# Patient Record
Sex: FEMALE | Race: BLACK OR AFRICAN AMERICAN | NOT HISPANIC OR LATINO | Employment: UNEMPLOYED | URBAN - METROPOLITAN AREA
[De-identification: names, ages, dates, MRNs, and addresses within clinical notes are randomized per-mention and may not be internally consistent; named-entity substitution may affect disease eponyms.]

---

## 2017-01-03 ENCOUNTER — OFFICE VISIT (OUTPATIENT)
Dept: BEHAVIORAL HEALTH | Facility: PHYSICIAN GROUP | Age: 37
End: 2017-01-03
Payer: COMMERCIAL

## 2017-01-03 VITALS
HEART RATE: 93 BPM | DIASTOLIC BLOOD PRESSURE: 106 MMHG | SYSTOLIC BLOOD PRESSURE: 145 MMHG | WEIGHT: 142 LBS | HEIGHT: 63 IN | BODY MASS INDEX: 25.16 KG/M2

## 2017-01-03 DIAGNOSIS — F10.20 ALCOHOL USE DISORDER, MODERATE, DEPENDENCE (HCC): ICD-10-CM

## 2017-01-03 DIAGNOSIS — F41.9 ANXIETY DISORDER, UNSPECIFIED: ICD-10-CM

## 2017-01-03 PROCEDURE — 99214 OFFICE O/P EST MOD 30 MIN: CPT | Performed by: PSYCHIATRY & NEUROLOGY

## 2017-01-03 RX ORDER — NALTREXONE HYDROCHLORIDE 50 MG/1
50 TABLET, FILM COATED ORAL DAILY
Qty: 30 TAB | Refills: 0 | Status: SHIPPED | OUTPATIENT
Start: 2017-01-03 | End: 2017-02-03 | Stop reason: SDUPTHER

## 2017-01-03 RX ORDER — BUSPIRONE HYDROCHLORIDE 10 MG/1
10 TABLET ORAL 2 TIMES DAILY
Qty: 60 TAB | Refills: 0 | Status: SHIPPED | OUTPATIENT
Start: 2017-01-03 | End: 2017-02-03

## 2017-01-03 NOTE — PROGRESS NOTES
PSYCHIATRY FOLLOW-UP NOTE      Chief Complaint   Patient presents with   • Follow-Up     alcohol dependence         History Of Present Illness:  Donna Morales is a 36 y.o. old female with HTN, alcohol use disorder comes in today for follow up, was last seen by LOGAN Jimenes 4 weeks ago. She is switching providers and this is her first visit with me. She reports continued struggles with alcohol with her alcohol. She has had problems with alcohol for almost 10 years, endorses binge drinking mainly on the weekends or during medications. She avoids drinking during weekdays because of her job. She denies any recent problems at her job because of drinking but in the past she has been in trouble because of her drinking. She had one DUI in 2011. She states that she starts drinking socially with her friends but it continues even after she gets back home. She likes to drink mixed drinks or red wine, is not sure about her quantity but feels that it might be close to three fourths of a bottle. She has withdrawal symptoms including severe anxiety, shaky, nausea, poor sleep. Denies history of alcohol withdrawal seizures. Her last drink was on New Year's Kimberley when she had a couple of champagne drinks. She has alcohol cravings and would like to take any medication to curb those cravings. She endorses having some anxiety growing up as well but it has worsened with her heavy alcohol use. Denies persistent prominent depressive symptoms that does feel sad and frustrated with her alcohol intake. Sleep is often problematic but problems staying asleep, uses trazodone as needed which does help her sleep. Appetite fine. Denies anhedonia. Denies use of any illicit drug. Denies having thoughts of wanting to hurt herself or others.    Social History:   Employed, has been working as a  for 15 years. Lives alone in [place]. She has good support from her mother who lives close to her. Single currently, never  ", no kids.     Father - alcohol and illicit drug addiction  Paternal aunt - illicit drug addiction  Paternal grand parents - alcohol addiction     Substance Use:  Alcohol - See HPI  Nicotine - Denies  Illicit drugs - Denies    Past Medication Trials:  Topamax, Ativan    Medications:  Current Outpatient Prescriptions   Medication Sig Dispense Refill   • naltrexone (DEPADE) 50 MG Tab Take 1 Tab by mouth every day. 30 Tab 0   • busPIRone (BUSPAR) 10 MG Tab Take 1 Tab by mouth 2 times a day. 60 Tab 0   • LO LOESTRIN FE 1 MG-10 MCG / 10 MCG Tab   11   • lisinopril-hydrochlorothiazide (PRINZIDE, ZESTORETIC) 20-25 MG per tablet Take 1 Tab by mouth every day.     • FLUVIRIN PRESERVATIVE FREE 0.5 ML Suspension Prefilled Syringe   0   • meloxicam (MOBIC) 15 MG tablet   5   • trazodone (DESYREL) 100 MG Tab Take 100 mg by mouth every evening.       No current facility-administered medications for this visit.       Review Of Systems:    Constitutional - Positive for fatigue  Respiratory - Negative for shortness of breath, cough  CVS - Negative for chest pain, palpitations  GI - Negative for nausea, vomiting, abdominal pain, diarrhea, constipation  Musculoskeletal - Negative for back pain  Neurological - Negative for headaches  Psychiatric - Positive for alcohol cravings, depression, anxiety, poor sleep    Physical Examination:  Vital signs: /106 mmHg  Pulse 93  Ht 1.6 m (5' 2.99\")  Wt 64.411 kg (142 lb)  BMI 25.16 kg/m2  LMP 01/01/2017    Musculoskeletal: Normal gait. No abnormal movements.     Mental Status Evaluation:   General: Young black female, dressed in casual attire, good grooming and hygiene, in no apparent distress, calm and cooperative, good eye contact, no psychomotor agitation or retardation  Orientation: Alert and oriented to person, place and time  Recent and remote memory: Grossly intact  Attention span and concentration: Grossly intact  Speech: Spontaneous, normal rate, rhythm and " "tone  Thought Process: Linear, logical and goal directed  Thought Content: Denies suicidal or homicidal ideations, intent or plan  Perception: Denies auditory or visual hallucinations. No delusions noted  Associations: Intact  Language: Appropriate  Fund of knowledge and vocabulary: Grossly adequate  Mood: \"fine\"  Affect: Slightly dysphoric, mood congruent  Insight: Partial  Judgment: Fair      Impression:  1. Alcohol use disorder, moderate  2. Unspecified anxiety disorder    Medical Records/Labs/Diagnostic Tests Reviewed:  [facility] records -no controlled medications since June 2016    Plan:  1. Discussed diagnosis and further management. Given heavy alcohol use, will try and focus on her alcohol cravings before trying any alternate medication for her anxiety. Unclear if she has a primary anxiety disorder. Her anxiety is related to her heavy alcohol use.  2. Start Naltrexone 50 mg daily for alcohol cravings. Discussed side effects including nausea, abdominal pain, diarrhea, insomnia, dizziness, headache etc. Discussed importance of medication compliance. Informed her that she cannot be on opiate medications along with naltrexone to avoid potential opiate withdrawal symptoms.  3. Will check liver function test before starting naltrexone.  4. Will check vitamin B1, B12 and folic acid to rule out any nutritional deficiency with heavy alcohol use  5. Continue BuSpar 10 mg twice daily for anxiety. Discussed importance of compliance with BuSpar and encourage her to take it as directed.  6. Continue Trazodone 100 mg at bedtime as needed for sleep  7. Continue psychotherapy with Enedina Hairston L.C.S.W.    Return to clinic in 4 weeks or sooner if symptoms worsen    The proposed treatment plan was discussed with the patient who was provided the opportunity to ask questions and make suggestions regarding alternative treatment. Patient verbalized understanding and expressed agreement with the plan.     Total face-to-face time: " 30 minutes  More than 50% of face-to-face time was spent in counseling and coordinating care. Discussed alcohol addiction, medications to treat alcohol addiction, concurrent anxiety and depression with heavy alcohol use.    Sujey Dunbar M.D.  01/03/2017

## 2017-01-04 ENCOUNTER — HOSPITAL ENCOUNTER (OUTPATIENT)
Dept: LAB | Facility: MEDICAL CENTER | Age: 37
End: 2017-01-04
Attending: PSYCHIATRY & NEUROLOGY
Payer: COMMERCIAL

## 2017-01-04 DIAGNOSIS — F10.20 ALCOHOL USE DISORDER, MODERATE, DEPENDENCE (HCC): ICD-10-CM

## 2017-01-04 LAB
ALBUMIN SERPL BCP-MCNC: 4.2 G/DL (ref 3.2–4.9)
ALP SERPL-CCNC: 56 U/L (ref 30–99)
ALT SERPL-CCNC: 16 U/L (ref 2–50)
AST SERPL-CCNC: 42 U/L (ref 12–45)
BILIRUB CONJ SERPL-MCNC: <0.1 MG/DL (ref 0.1–0.5)
BILIRUB INDIRECT SERPL-MCNC: NORMAL MG/DL (ref 0–1)
BILIRUB SERPL-MCNC: 0.3 MG/DL (ref 0.1–1.5)
FOLATE SERPL-MCNC: 10.6 NG/ML
PROT SERPL-MCNC: 7 G/DL (ref 6–8.2)
VIT B12 SERPL-MCNC: 165 PG/ML (ref 211–911)

## 2017-01-04 PROCEDURE — 82607 VITAMIN B-12: CPT

## 2017-01-04 PROCEDURE — 82746 ASSAY OF FOLIC ACID SERUM: CPT

## 2017-01-04 PROCEDURE — 80076 HEPATIC FUNCTION PANEL: CPT

## 2017-01-04 PROCEDURE — 36415 COLL VENOUS BLD VENIPUNCTURE: CPT

## 2017-01-04 PROCEDURE — 84425 ASSAY OF VITAMIN B-1: CPT

## 2017-01-05 ENCOUNTER — TELEPHONE (OUTPATIENT)
Dept: BEHAVIORAL HEALTH | Facility: PHYSICIAN GROUP | Age: 37
End: 2017-01-05

## 2017-01-05 RX ORDER — CHOLECALCIFEROL (VITAMIN D3) 25 MCG
1000 TABLET,CHEWABLE ORAL DAILY
Qty: 30 CAP | Refills: 0 | Status: SHIPPED | OUTPATIENT
Start: 2017-01-05 | End: 2017-02-03 | Stop reason: SDUPTHER

## 2017-01-05 NOTE — TELEPHONE ENCOUNTER
Called and discussed low B12 levels. E-prescribed 1000 mcg daily for supplementation. Will recheck levels in 3 months

## 2017-01-08 LAB — VIT B1 BLD-MCNC: 74 NMOL/L (ref 70–180)

## 2017-01-18 ENCOUNTER — APPOINTMENT (OUTPATIENT)
Dept: BEHAVIORAL HEALTH | Facility: PHYSICIAN GROUP | Age: 37
End: 2017-01-18

## 2017-02-03 ENCOUNTER — OFFICE VISIT (OUTPATIENT)
Dept: BEHAVIORAL HEALTH | Facility: PHYSICIAN GROUP | Age: 37
End: 2017-02-03
Payer: COMMERCIAL

## 2017-02-03 VITALS
BODY MASS INDEX: 24.1 KG/M2 | WEIGHT: 136 LBS | HEART RATE: 91 BPM | SYSTOLIC BLOOD PRESSURE: 165 MMHG | HEIGHT: 63 IN | DIASTOLIC BLOOD PRESSURE: 98 MMHG

## 2017-02-03 DIAGNOSIS — F41.9 ANXIETY DISORDER, UNSPECIFIED: ICD-10-CM

## 2017-02-03 DIAGNOSIS — F10.20 ALCOHOL USE DISORDER, MODERATE, DEPENDENCE (HCC): ICD-10-CM

## 2017-02-03 PROCEDURE — 99214 OFFICE O/P EST MOD 30 MIN: CPT | Performed by: PSYCHIATRY & NEUROLOGY

## 2017-02-03 RX ORDER — CHOLECALCIFEROL (VITAMIN D3) 25 MCG
1000 TABLET,CHEWABLE ORAL DAILY
Qty: 30 CAP | Refills: 1 | Status: SHIPPED | OUTPATIENT
Start: 2017-02-03 | End: 2017-04-13 | Stop reason: SDUPTHER

## 2017-02-03 RX ORDER — BUSPIRONE HYDROCHLORIDE 15 MG/1
15 TABLET ORAL 2 TIMES DAILY
Qty: 60 TAB | Refills: 1 | Status: SHIPPED | OUTPATIENT
Start: 2017-02-03 | End: 2017-04-13 | Stop reason: SDUPTHER

## 2017-02-03 RX ORDER — NALTREXONE HYDROCHLORIDE 50 MG/1
50 TABLET, FILM COATED ORAL DAILY
Qty: 30 TAB | Refills: 1 | Status: SHIPPED | OUTPATIENT
Start: 2017-02-03 | End: 2017-04-13 | Stop reason: SDUPTHER

## 2017-02-04 NOTE — PROGRESS NOTES
PSYCHIATRY FOLLOW-UP NOTE      Chief Complaint   Patient presents with   • Follow-Up     alcohol use disorder, anxiety disorder         History Of Present Illness:  Donna Morales is a 36 y.o. old female with HTN, alcohol use disorder comes in today for follow up, was last seen 4 weeks ago. Reports doing somewhat better since her last visit here. She took naltrexone but due to insurance issues has been taking it only for the last 2 weeks and has noticed a decline in her alcohol cravings. She has not been drinking on the week days and even on the weekends feels that she is drinking less. She does not drink beer and likes to drink mixed drinks or wine. She feels that her mom has noticed a decline in her alcohol intake as well. She has not been to AA meetings in a while and is considering going back there. She denies any new stressors. Denies any legal problems due to alcohol. Continues to have anxiety and has been compliant with BuSpar. Lately she has been noticing more of social anxiety with public speaking. She has had that since she was a child. Sleep has been on and off, feels that her 100 mg of trazodone makes her tired the next day. Denies any depressive symptoms currently. Appetite good. Denies anger or irritability. Denies having thoughts of wanting to hurt herself or others.    Social History:   Employed, has been working as a  for 15 years. Lives alone in [place]. She has good support from her mother who lives close to her. Single currently, never , no kids.     Substance Use:  Alcohol - Has decreased her alcohol intake. She denies alcohol use during the weekdays and on the weekends has been drinking couple of mixed drinks.  Nicotine - Denies  Illicit drugs - Denies    Past Medication Trials:  Topamax, Ativan    Medications:  Current Outpatient Prescriptions   Medication Sig Dispense Refill   • Cyanocobalamin (B-12) 1000 MCG Cap Take 1,000 mcg by mouth every day. 30 Cap 0  "  • naltrexone (DEPADE) 50 MG Tab Take 1 Tab by mouth every day. 30 Tab 0   • busPIRone (BUSPAR) 10 MG Tab Take 1 Tab by mouth 2 times a day. 60 Tab 0   • FLUVIRIN PRESERVATIVE FREE 0.5 ML Suspension Prefilled Syringe   0   • meloxicam (MOBIC) 15 MG tablet   5   • LO LOESTRIN FE 1 MG-10 MCG / 10 MCG Tab   11   • trazodone (DESYREL) 100 MG Tab Take 100 mg by mouth every evening.     • lisinopril-hydrochlorothiazide (PRINZIDE, ZESTORETIC) 20-25 MG per tablet Take 1 Tab by mouth every day.       No current facility-administered medications for this visit.       Review Of Systems:    Constitutional - Positive for fatigue  Respiratory - Negative for shortness of breath, cough  CVS - Negative for chest pain, palpitations  GI - Negative for nausea, vomiting, abdominal pain, diarrhea, constipation  Musculoskeletal - Negative for back pain  Neurological - Negative for headaches  Psychiatric - Positive for alcohol cravings (improved), anxiety, poor sleep    Physical Examination:  Vital signs: /98 mmHg  Pulse 91  Ht 1.6 m (5' 2.99\")  Wt 61.689 kg (136 lb)  BMI 24.10 kg/m2    Musculoskeletal: Normal gait. No abnormal movements.     Mental Status Evaluation:   General: Young black female, dressed in casual attire, good grooming and hygiene, in no apparent distress, calm and cooperative, good eye contact, no psychomotor agitation or retardation  Orientation: Alert and oriented to person, place and time  Recent and remote memory: Grossly intact  Attention span and concentration: Grossly intact  Speech: Spontaneous, normal rate, rhythm and tone  Thought Process: Linear, logical and goal directed  Thought Content: Denies suicidal or homicidal ideations, intent or plan  Perception: Denies auditory or visual hallucinations. No delusions noted  Associations: Intact  Language: Appropriate  Fund of knowledge and vocabulary: Grossly adequate  Mood: \"fine\"  Affect: Euthymic, mood congruent  Insight: Good  Judgment: " Good      Impression:  1. Alcohol use disorder, moderate  2. Unspecified anxiety disorder    Medical Records/Labs/Diagnostic Tests Reviewed:  [place]  records - no controlled medications since June 2016    Plan:  1. Continue naltrexone 50 mg daily for alcohol cravings  2. Increase buspirone to 15 mg twice daily for anxiety  3. Continue trazodone but decrease dose to 50 mg at bedtime as needed for sleep  4. Continue vitamin B-12 replacement  5. Continue psychotherapy with Enedina Hairston L.C.S.W.  6. Encouraged her to start attending AA meetings and get a sponsor     Return to clinic in 2 months or sooner if symptoms worsen    The proposed treatment plan was discussed with the patient who was provided the opportunity to ask questions and make suggestions regarding alternative treatment. Patient verbalized understanding and expressed agreement with the plan.     Sujey Dunbar M.D.  02/03/2017    This note was created using voice recognition software (Dragon). The accuracy of the dictation is limited by the abilities of the software. I have reviewed the note prior to signing, however some errors in grammar and context are still possible. If you have any questions related to this note please do not hesitate to contact our office.

## 2017-03-07 ENCOUNTER — NON-PROVIDER VISIT (OUTPATIENT)
Dept: BEHAVIORAL HEALTH | Facility: PHYSICIAN GROUP | Age: 37
End: 2017-03-07
Payer: COMMERCIAL

## 2017-03-07 DIAGNOSIS — F10.20 ALCOHOL USE DISORDER, MODERATE, DEPENDENCE (HCC): ICD-10-CM

## 2017-03-07 PROCEDURE — 90834 PSYTX W PT 45 MINUTES: CPT | Performed by: SOCIAL WORKER

## 2017-03-13 NOTE — BH THERAPY
"Pt states she is seeing Dr Dunbar, who is treating her anxiety and alcohol abuse issues.  Pt has not been taking Naltrexone regularly (forgets) and is still experiencing daily alcohol cravings.  She says Buspar is only moderately helpful for anxiety.  Denies drinking daily, use continues to be a binge pattern.  Pt feels at risk when she has unstructured time, ie, weekends, vacation.  Has been \"caught\" in the past at work and [employer] w/alcohol on her breath in the morning, so is careful to drink moderately (or abstain) the night before work and [employer].  Does not directly respond to questions about how much alcohol she consumes or when.   Has attended AA meetings, does not have sponsor.  Sometimes feels more anxious, wanting to drink, after meetings.     [facility]  Therapy Progress Note    Patient Name: Donna Morales  Patient MRN: [id number]  Today's Date: 3/13/2017     Type of session:Individual psychotherapy  Length of session: 45  Persons in attendance:Patient    Subjective/New Info: Alcohol use continues to be problematic.  Wants to commit to abstinence.  Difficulty doing this.    Objective/Observations:   Participation: Active verbal participation   Grooming: Neat   Cognition: Fully Oriented   Eye contact: Good   Mood: Anxious   Affect: Congruent with content   Thought process: Goal-directed   Speech: Rate within normal limits   Other:     Diagnoses:   1. Alcohol use disorder, moderate, dependence (CMS-HCC)         Current risk:   SUICIDE: Low   Homicide: Low   Self-harm: Low   Relapse: High   Other:    Safety Plan reviewed? Not Indicated   If evidence of imminent risk is present, intervention/plan:     Therapeutic Intervention(s): Maladaptive behavior addressed/explored    Treatment Goal(s)/Objective(s) addressed: Alcohol abuse/anxiety - exploring pt's goal w/regard to alcohol use     Progress toward Treatment Goals: No change    Plan:  - 1.  Take Naltrexone and Buspar regularly      2.  " Continue AA, attend meetings at least 3x/week, suggest women's meetings.       3.  Get a sponsor    Enedina Hairston L.C.S.W.  3/13/2017

## 2017-03-20 ENCOUNTER — TELEPHONE (OUTPATIENT)
Dept: BEHAVIORAL HEALTH | Facility: PHYSICIAN GROUP | Age: 37
End: 2017-03-20

## 2017-03-21 ENCOUNTER — HOSPITAL ENCOUNTER (OUTPATIENT)
Dept: BEHAVIORAL HEALTH | Facility: MEDICAL CENTER | Age: 37
End: 2017-03-21
Attending: PSYCHIATRY & NEUROLOGY
Payer: COMMERCIAL
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
## 2017-03-27 ENCOUNTER — HOSPITAL ENCOUNTER (OUTPATIENT)
Dept: BEHAVIORAL HEALTH | Facility: MEDICAL CENTER | Age: 37
End: 2017-03-27
Attending: PSYCHIATRY & NEUROLOGY
Payer: COMMERCIAL

## 2017-03-27 DIAGNOSIS — F10.20 ALCOHOL USE DISORDER, MODERATE, DEPENDENCE (HCC): ICD-10-CM

## 2017-03-27 PROCEDURE — 90853 GROUP PSYCHOTHERAPY: CPT

## 2017-03-28 ENCOUNTER — HOSPITAL ENCOUNTER (OUTPATIENT)
Dept: BEHAVIORAL HEALTH | Facility: MEDICAL CENTER | Age: 37
End: 2017-03-28
Attending: PSYCHIATRY & NEUROLOGY
Payer: COMMERCIAL

## 2017-03-28 DIAGNOSIS — F10.20 ALCOHOL USE DISORDER, MODERATE, DEPENDENCE (HCC): ICD-10-CM

## 2017-03-28 PROCEDURE — 90853 GROUP PSYCHOTHERAPY: CPT

## 2017-03-28 NOTE — BH THERAPY
Behavioral Health  Group Therapy Progress Note    Name: Donna Morales  MRN: [id number]  Date: 3/27/2017    Attendance: Attended   Attendance Duration (min): Greater than 60  Number of Participants: 3     Program/Group: Intensive Outpatient Program  Topics Covered: Dual Diagnosis  Participation: Active verbal participation, Attentive  Affect/Mood Range: Normal range, Flexible  Affect/Mood Display: Congruent w/content  Cognition: Alert, Oriented  Evidence of Imminent Suicide Risk: No  Evidence of imminent homicide risk: No  Therapeutic Interventions: Relapse prevention, Motivation addressed, Goal-setting  Progress Toward Treatment Goal: No change.  She started program tonight and has not had a drink for a week.  She said she still feels shakey inside but no tremors were visible and her BP was 108/72, P 80.  She says she had been drinking a lot and knows she needs to attend AA and get a sponsor.  She has a supportive mother and she has a job which she will return to next week.        Treatment Plan: - Next appointment scheduled:  3/28/2017     Jackelyn Tinoco R.N.

## 2017-03-28 NOTE — BH THERAPY
Group Therapy Checklist  Attendance: Attended  Attendance Duration (min): Greater than 60  Number of Participants: 3  Program/Group: Intensive Outpatient Program  Topics Covered: Dual Diagnosis  Participation: Active verbal participation, Attentive  Affect/Mood Range: Normal range, Flexible  Affect/Mood Display: Congruent w/content  Cognition: Alert, Oriented  Evidence of Imminent Suicide Risk: No  Evidence of imminent homicide risk: No  Therapeutic Interventions: Relapse prevention, Motivation addressed, Goal-setting  Progress Toward Treatment Goal: No change as this is her first night in program.

## 2017-03-29 NOTE — BH THERAPY
Group Therapy Checklist  Attendance: Attended  Attendance Duration (min): Greater than 60  Number of Participants: 5  Program/Group: Intensive Outpatient Program  Topics Covered: Steps 1/2/3  Participation: Active verbal participation, Attentive  Affect/Mood Range: Normal range, Flexible  Affect/Mood Display: Congruent w/content  Cognition: Alert, Oriented  Evidence of Imminent Suicide Risk: No  Evidence of imminent homicide risk: No  Therapeutic Interventions: Relapse prevention, Motivation addressed, Goal-setting  Progress Toward Treatment Goal: Mild improvement.

## 2017-03-29 NOTE — BH THERAPY
"Behavioral Health  Group Therapy Progress Note    Name: Donna Morales  MRN: [id number]  Date: 3/28/2017    Attendance: Attended   Attendance Duration (min): Greater than 60  Number of Participants: 5     Program/Group: Intensive Outpatient Program  Topics Covered: Steps 1/2/3  Participation: Active verbal participation, Attentive  Affect/Mood Range: Normal range, Flexible  Affect/Mood Display: Congruent w/content  Cognition: Alert, Oriented  Evidence of Imminent Suicide Risk: No  Evidence of imminent homicide risk: No  Therapeutic Interventions: Relapse prevention, Motivation addressed, Goal-setting  Progress Toward Treatment Goal: Mild improvement.  She is motivated for recovery and is planning to go to AA tomorrow night.  She said she has been looking for a sponsor for 2 weeks.  Encouraged to obtain a sponsor even if she is not \"perfect\".          Treatment Plan: - Next appointment scheduled:  3/30/2017     Jackelyn Tinoco R.N."

## 2017-03-30 ENCOUNTER — HOSPITAL ENCOUNTER (OUTPATIENT)
Dept: BEHAVIORAL HEALTH | Facility: MEDICAL CENTER | Age: 37
End: 2017-03-30
Attending: PSYCHIATRY & NEUROLOGY
Payer: COMMERCIAL

## 2017-03-30 DIAGNOSIS — F10.20 ALCOHOL USE DISORDER, MODERATE, DEPENDENCE (HCC): ICD-10-CM

## 2017-03-30 PROCEDURE — 90853 GROUP PSYCHOTHERAPY: CPT

## 2017-03-31 NOTE — BH THERAPY
Group Therapy Checklist  Attendance: Attended  Attendance Duration (min): Greater than 60  Number of Participants: 4  Program/Group: Intensive Outpatient Program  Topics Covered:  (Bridging the Gap)  Participation: Active verbal participation, Attentive  Affect/Mood Range: Normal range, Flexible  Affect/Mood Display: Congruent w/content  Cognition: Alert, Oriented  Evidence of Imminent Suicide Risk: No  Evidence of imminent homicide risk: No  Therapeutic Interventions: Relapse prevention, Self-care skills, Goal-setting  Progress Toward Treatment Goal: Mild improvement.

## 2017-03-31 NOTE — BH THERAPY
Behavioral Health  Group Therapy Progress Note    Name: Donna Morales  MRN: [id number]  Date: 3/30/2017    Attendance: Attended   Attendance Duration (min): Greater than 60  Number of Participants: 4     Program/Group: Intensive Outpatient Program  Topics Covered:  (Bridging the Gap)  Participation: Active verbal participation, Attentive  Affect/Mood Range: Normal range, Flexible  Affect/Mood Display: Congruent w/content  Cognition: Alert, Oriented  Evidence of Imminent Suicide Risk: No  Evidence of imminent homicide risk: No  Therapeutic Interventions: Relapse prevention, Self-care skills, Goal-setting  Progress Toward Treatment Goal: Mild improvement.  She said she is starting to feel better and thinks the side effects she was experiencing from her psychiatric medications are starting to go away.  She is going to AA and has decided to ask someone to be her sponsor.  She continues motivated for recovery.        Treatment Plan: - Next appointment scheduled:  4/3/2017     Jackelyn Tinoco R.N.

## 2017-04-03 ENCOUNTER — HOSPITAL ENCOUNTER (OUTPATIENT)
Dept: BEHAVIORAL HEALTH | Facility: MEDICAL CENTER | Age: 37
End: 2017-04-03
Attending: PSYCHIATRY & NEUROLOGY
Payer: COMMERCIAL

## 2017-04-03 DIAGNOSIS — F10.20 ALCOHOL USE DISORDER, MODERATE, DEPENDENCE (HCC): ICD-10-CM

## 2017-04-03 PROCEDURE — 90853 GROUP PSYCHOTHERAPY: CPT

## 2017-04-04 ENCOUNTER — APPOINTMENT (OUTPATIENT)
Dept: BEHAVIORAL HEALTH | Facility: MEDICAL CENTER | Age: 37
End: 2017-04-04
Attending: PSYCHIATRY & NEUROLOGY
Payer: COMMERCIAL

## 2017-04-04 DIAGNOSIS — F41.9 ANXIETY DISORDER, UNSPECIFIED: ICD-10-CM

## 2017-04-04 DIAGNOSIS — F10.20 ALCOHOL USE DISORDER, MODERATE, DEPENDENCE (HCC): ICD-10-CM

## 2017-04-04 PROCEDURE — 90853 GROUP PSYCHOTHERAPY: CPT

## 2017-04-04 PROCEDURE — 90834 PSYTX W PT 45 MINUTES: CPT

## 2017-04-04 NOTE — BH THERAPY
[facility]  Therapy Progress Note    Patient Name: Donna Morales  Patient MRN: [id number]  Today's Date: 4/4/2017     Type of session:Individual psychotherapy  Length of session: 50 min  Persons in attendance:Patient    Subjective/New Info: Processed assessment tools.  Identifies some confusion about the 12 steps and how they are a therapeutic tool.  Receptive to teaching and the importance of accepting that she is powerless over alcohol and how it has been the drinking that has created the problems in her life.    Objective/Observations:   Participation: Active verbal participation, Attentive, Engaged and Open to feedback   Grooming: Casual and Neat   Cognition: Alert and Fully Oriented   Eye contact: Good   Mood: Depressed and Anxious   Affect: Flexible, Labile, Sad, Anxious and Tearful   Thought process: Logical and Goal-directed   Speech: Rate within normal limits and Volume within normal limits   Other:     Diagnoses: No diagnosis found.     Current risk:   SUICIDE: Not applicable   Homicide: Not applicable   Self-harm: Not applicable   Relapse: Low   Other:    Safety Plan reviewed? Not Indicated   If evidence of imminent risk is present, intervention/plan:     Therapeutic Intervention(s): Cognitive modification, Communication skills, Conflict clarification, Conflict resolution skills, Distress tolerance skills, Establish rapport and Interpersonal effectiveness skills    Treatment Goal(s)/Objective(s) addressed: Receptive to the importance of not drinking.  Willing to attend AA and look for a sponsor.     Progress toward Treatment Goals: Mild improvement    Plan:  - Continue Individual therapy, Group therapy, Intensive Outpatient Program and 12-Step participation  Step One    Jacklyn Avendano R.N.  4/4/2017

## 2017-04-04 NOTE — BH THERAPY
"Group Therapy Checklist  Attendance: Attended  Attendance Duration (min): 46-60  Number of Participants: 4  Program/Group: Intensive Outpatient Program  Topics Covered: \"Pieces of Silence\"  Participation: Active verbal participation, Attentive  Affect/Mood Range: Normal range, Flexible  Affect/Mood Display: Congruent w/content, Labile  Cognition: Oriented  Evidence of Imminent Suicide Risk: No  Evidence of imminent homicide risk: No  Therapeutic Interventions: Cognitive clarification  Progress Toward Treatment Goal: Moderate improvement  "

## 2017-04-04 NOTE — BH THERAPY
Group Therapy Checklist  Attendance: Attended  Attendance Duration (min): Greater than 60  Number of Participants: 4  Program/Group: Intensive Outpatient Program  Topics Covered: Addiction behaviors (shared her struggle with her cravings and getting to develop a support network in AA.  Receptive to support from peers.)  Participation: Active verbal participation  Affect/Mood Range: Flexible, Normal range  Affect/Mood Display: Congruent w/content  Cognition: Oriented, Alert  Evidence of Imminent Suicide Risk: No  Evidence of imminent homicide risk: No  Therapeutic Interventions: Emotion clarification  Progress Toward Treatment Goal: Moderate improvement

## 2017-04-04 NOTE — CARE PLAN
Problem: Substance Induced Symptoms  Goal: Abstinence  Intervention: Individual Counseling Sessions  Processed assessment tools.  Receptive to 12 step therapy and ongoing involvement in AA.

## 2017-04-05 NOTE — BH THERAPY
Group Therapy Checklist  Attendance: Attended  Attendance Duration (min): 46-60  Number of Participants: 4  Program/Group: Intensive Outpatient Program  Topics Covered: Recovery Planning  Participation: Active verbal participation  Affect/Mood Range: Normal range, Flexible  Affect/Mood Display: Congruent w/content  Cognition: Alert, Oriented  Evidence of Imminent Suicide Risk: No  Evidence of imminent homicide risk: No  Therapeutic Interventions: Values clarification, Relapse prevention  Progress Toward Treatment Goal: Moderate improvement

## 2017-04-05 NOTE — BH THERAPY
Group Therapy Checklist  Attendance: Attended  Attendance Duration (min): Greater than 60  Number of Participants: 4  Program/Group: Intensive Outpatient Program  Topics Covered: Other (Comment): (process group)  Participation: Active verbal participation, Attentive, Supportive to other group members, Open to feedback  Affect/Mood Range: Normal range, Flexible  Affect/Mood Display: Congruent w/content  Cognition: Alert, Oriented  Evidence of Imminent Suicide Risk: No  Evidence of imminent homicide risk: No  Therapeutic Interventions: Emotion clarification, Supportive psychotherapy  Progress Toward Treatment Goal: Moderate improvement  Donna processed returning to work and facing her co-workers. She described clear boundaries set and maintained when gossip seeped in. She is attending AA, enjoying women's meetings. She attends [employer] on Sunday and is looking to visit other churches in the future. Receptive to peer support.

## 2017-04-06 ENCOUNTER — HOSPITAL ENCOUNTER (OUTPATIENT)
Dept: BEHAVIORAL HEALTH | Facility: MEDICAL CENTER | Age: 37
End: 2017-04-06
Attending: PSYCHIATRY & NEUROLOGY
Payer: COMMERCIAL

## 2017-04-06 DIAGNOSIS — F10.20 ALCOHOL USE DISORDER, MODERATE, DEPENDENCE (HCC): ICD-10-CM

## 2017-04-06 PROCEDURE — 90853 GROUP PSYCHOTHERAPY: CPT

## 2017-04-07 NOTE — ADDENDUM NOTE
Encounter addended by: Jacklyn Avendano R.N. on: 4/6/2017  7:37 PM<BR>     Documentation filed: Notes Section

## 2017-04-07 NOTE — BH THERAPY
Group Therapy Checklist  Attendance: Attended  Attendance Duration (min): 46-60  Number of Participants: 4  Program/Group: Intensive Outpatient Program  Topics Covered: Belief Systems  Participation: Active verbal participation  Affect/Mood Range: Flexible, Normal range  Affect/Mood Display: Congruent w/content  Cognition: Oriented, Alert  Evidence of Imminent Suicide Risk: No  Evidence of imminent homicide risk: No  Therapeutic Interventions: Cognitive clarification  Progress Toward Treatment Goal: Moderate improvement

## 2017-04-07 NOTE — BH THERAPY
Group Therapy Checklist  Attendance: Attended  Attendance Duration (min): Greater than 60  Number of Participants: 4  Program/Group: Intensive Outpatient Program  Topics Covered: Weekend planning  Participation: Active verbal participation shared that she has a plan to attend AA. Is feeling more secure at work as she feels the people aren't talking about her.  Receptive to support from peers.  Affect/Mood Range: Flexible, Normal range  Affect/Mood Display: Congruent w/content  Cognition: Oriented, Alert  Evidence of Imminent Suicide Risk: No  Evidence of imminent homicide risk: No  Therapeutic Interventions: Emotion clarification  Progress Toward Treatment Goal: Moderate improvement

## 2017-04-10 ENCOUNTER — HOSPITAL ENCOUNTER (OUTPATIENT)
Dept: BEHAVIORAL HEALTH | Facility: MEDICAL CENTER | Age: 37
End: 2017-04-10
Attending: PSYCHIATRY & NEUROLOGY
Payer: COMMERCIAL

## 2017-04-10 DIAGNOSIS — F10.20 ALCOHOL USE DISORDER, MODERATE, DEPENDENCE (HCC): ICD-10-CM

## 2017-04-10 DIAGNOSIS — F10.90 ALCOHOL USE DISORDER: ICD-10-CM

## 2017-04-10 PROCEDURE — 90853 GROUP PSYCHOTHERAPY: CPT

## 2017-04-11 ENCOUNTER — HOSPITAL ENCOUNTER (OUTPATIENT)
Dept: BEHAVIORAL HEALTH | Facility: MEDICAL CENTER | Age: 37
End: 2017-04-11
Attending: PSYCHIATRY & NEUROLOGY
Payer: COMMERCIAL

## 2017-04-11 DIAGNOSIS — F10.20 ALCOHOL USE DISORDER, MODERATE, DEPENDENCE (HCC): ICD-10-CM

## 2017-04-11 PROCEDURE — 90853 GROUP PSYCHOTHERAPY: CPT

## 2017-04-11 PROCEDURE — 90834 PSYTX W PT 45 MINUTES: CPT

## 2017-04-11 RX ORDER — TOPIRAMATE SPINKLE 15 MG/1
15 CAPSULE ORAL 2 TIMES DAILY
COMMUNITY
End: 2017-04-13

## 2017-04-11 NOTE — BH THERAPY
Group Therapy Checklist  Attendance: Attended  Attendance Duration (min): 46-60  Number of Participants: 4  Program/Group: Intensive Outpatient Program  Topics Covered: Assertiveness  Participation: Active verbal participation  Affect/Mood Range: Normal range, Flexible  Affect/Mood Display: Congruent w/content  Cognition: Alert, Oriented  Evidence of Imminent Suicide Risk: No  Evidence of imminent homicide risk: No  Therapeutic Interventions: Emotion clarification  Progress Toward Treatment Goal: Moderate improvement

## 2017-04-11 NOTE — CARE PLAN
Problem: Substance Induced Symptoms  Goal: Abstinence  Intervention: 12 Step guides  Completed step one.  Insight the consequences of her drinking.

## 2017-04-11 NOTE — BH THERAPY
Group Therapy Checklist  Attendance: Attended  Attendance Duration (min): Greater than 60  Number of Participants: 4  Program/Group: Intensive Outpatient Program  Topics Covered: Recovery Planning (Shared her challenge with going to the [employer] in October.  Shared how important it is to her.  Receptive to looking at one day at a time . Receptive to peer support. )  Participation: Active verbal participation, Attentive  Affect/Mood Range: Normal range, Flexible  Affect/Mood Display: Congruent w/content  Cognition: Alert, Oriented  Evidence of Imminent Suicide Risk: No  Evidence of imminent homicide risk: No  Therapeutic Interventions: Emotion clarification, Self-care skills  Progress Toward Treatment Goal: Moderate improvement

## 2017-04-11 NOTE — BH THERAPY
[facility]  Therapy Progress Note    Patient Name: Donna Morales  Patient MRN: [id number]  Today's Date: 4/11/2017     Type of session:Individual psychotherapy  Length of session: 50 min  Persons in attendance:Patient    Subjective/New Info: Processed step one. Able to identify significant consequences from her drinking. Continues to have a tendency to minimize the consequences of her drinking.  Receptive to acknowledging that the consequences of her drinking are significant.    Objective/Observations:   Participation: Active verbal participation, Attentive, Engaged and Open to feedback   Grooming: Casual and Neat   Cognition: Alert and Fully Oriented   Eye contact: Good   Mood: Anxious   Affect: Flexible and Full range   Thought process: Logical   Speech: Rate within normal limits and Volume within normal limits   Other:     Diagnoses: No diagnosis found.     Current risk:   SUICIDE: Not applicable   Homicide: Not applicable   Self-harm: Not applicable   Relapse: Low   Other:    Safety Plan reviewed? Not Indicated   If evidence of imminent risk is present, intervention/plan:     Therapeutic Intervention(s): Cognitive modification, Communication skills, Conflict clarification, Distress tolerance skills, Exposure exercise and Interpersonal effectiveness skills    Treatment Goal(s)/Objective(s) addressed: Completed step one. Continues to need more encouragement to connect with women in the program and get a sponsor.             Progress toward Treatment Goals: Moderate improvement    Plan:  - Continue Individual therapy, Group therapy, Intensive Outpatient Program and 12-Step participation    Jacklyn Avendano R.N.  4/11/2017

## 2017-04-12 NOTE — BH THERAPY
Attendance: Attended   Attendance Duration (min): Greater than 60  Number of Participants: 4     Program/Group: Intensive Outpatient Program  Topics Covered:  (process group )  Participation: Active verbal participation, Supportive to other group members, Open to feedback  Affect/Mood Range: Normal range  Affect/Mood Display: Congruent w/content  Cognition: Alert  Evidence of Imminent Suicide Risk: No  Evidence of imminent homicide risk: No  Therapeutic Interventions: Emotion clarification, Supportive psychotherapy, Self-care skills  Progress Toward Treatment Goal: Moderate improvement    Patient stated that she will be practicing self care after the group by attending [facility]. Patient reflected on the video. She reported relating to exaggerated behaviors while intoxicated. Patient was able to identify negative emotions and behaviors when drinking.

## 2017-04-12 NOTE — BH THERAPY
Attendance: Attended   Attendance Duration (min): 46-60  Number of Participants: 4     Program/Group: Intensive Outpatient Program  Topics Covered: Chalk talk  Participation: Attentive  Affect/Mood Range: Normal range  Affect/Mood Display: Congruent w/content  Cognition: Alert  Evidence of Imminent Suicide Risk: No  Evidence of imminent homicide risk: No  Therapeutic Interventions: Relapse prevention, Cognitive clarification  Progress Toward Treatment Goal: Moderate improvement

## 2017-04-13 ENCOUNTER — TELEPHONE (OUTPATIENT)
Dept: BEHAVIORAL HEALTH | Facility: MEDICAL CENTER | Age: 37
End: 2017-04-13

## 2017-04-13 ENCOUNTER — OFFICE VISIT (OUTPATIENT)
Dept: BEHAVIORAL HEALTH | Facility: PHYSICIAN GROUP | Age: 37
End: 2017-04-13
Payer: COMMERCIAL

## 2017-04-13 ENCOUNTER — HOSPITAL ENCOUNTER (OUTPATIENT)
Dept: BEHAVIORAL HEALTH | Facility: MEDICAL CENTER | Age: 37
End: 2017-04-13
Attending: PSYCHIATRY & NEUROLOGY
Payer: COMMERCIAL

## 2017-04-13 VITALS
HEART RATE: 85 BPM | BODY MASS INDEX: 23.92 KG/M2 | HEIGHT: 63 IN | SYSTOLIC BLOOD PRESSURE: 111 MMHG | WEIGHT: 135 LBS | DIASTOLIC BLOOD PRESSURE: 87 MMHG

## 2017-04-13 DIAGNOSIS — F10.20 ALCOHOL USE DISORDER, MODERATE, DEPENDENCE (HCC): Primary | ICD-10-CM

## 2017-04-13 DIAGNOSIS — F41.9 ANXIETY DISORDER, UNSPECIFIED: ICD-10-CM

## 2017-04-13 DIAGNOSIS — E53.8 B12 DEFICIENCY: ICD-10-CM

## 2017-04-13 PROCEDURE — 90853 GROUP PSYCHOTHERAPY: CPT

## 2017-04-13 PROCEDURE — 99214 OFFICE O/P EST MOD 30 MIN: CPT | Performed by: PSYCHIATRY & NEUROLOGY

## 2017-04-13 RX ORDER — BUSPIRONE HYDROCHLORIDE 15 MG/1
15 TABLET ORAL 2 TIMES DAILY
Qty: 60 TAB | Refills: 0 | Status: SHIPPED | OUTPATIENT
Start: 2017-04-13 | End: 2017-10-18 | Stop reason: SDUPTHER

## 2017-04-13 RX ORDER — HYDROXYZINE PAMOATE 50 MG/1
50 CAPSULE ORAL 3 TIMES DAILY
Qty: 90 CAP | Refills: 0 | Status: SHIPPED | OUTPATIENT
Start: 2017-04-13 | End: 2017-10-18 | Stop reason: SDUPTHER

## 2017-04-13 RX ORDER — NALTREXONE HYDROCHLORIDE 50 MG/1
50 TABLET, FILM COATED ORAL DAILY
Qty: 30 TAB | Refills: 0 | Status: SHIPPED | OUTPATIENT
Start: 2017-04-13 | End: 2018-01-11 | Stop reason: SDUPTHER

## 2017-04-13 RX ORDER — CHOLECALCIFEROL (VITAMIN D3) 25 MCG
1000 TABLET,CHEWABLE ORAL DAILY
Qty: 30 CAP | Refills: 0 | Status: SHIPPED | OUTPATIENT
Start: 2017-04-13 | End: 2017-06-22 | Stop reason: SDUPTHER

## 2017-04-13 RX ORDER — TOPIRAMATE 100 MG/1
100 TABLET, FILM COATED ORAL 2 TIMES DAILY
Qty: 60 TAB | Refills: 0 | Status: SHIPPED | OUTPATIENT
Start: 2017-04-13 | End: 2017-05-13 | Stop reason: SDUPTHER

## 2017-04-13 RX ORDER — HYDROXYZINE PAMOATE 50 MG/1
CAPSULE ORAL
Refills: 1 | COMMUNITY
Start: 2017-03-24 | End: 2017-04-13 | Stop reason: SDUPTHER

## 2017-04-13 RX ORDER — TRAZODONE HYDROCHLORIDE 100 MG/1
100 TABLET ORAL
Qty: 30 TAB | Refills: 0 | Status: SHIPPED | OUTPATIENT
Start: 2017-04-13 | End: 2017-05-11 | Stop reason: SDUPTHER

## 2017-04-13 RX ORDER — TOPIRAMATE 100 MG/1
TABLET, FILM COATED ORAL
Refills: 1 | COMMUNITY
Start: 2017-03-24 | End: 2017-04-13 | Stop reason: SDUPTHER

## 2017-04-13 NOTE — TELEPHONE ENCOUNTER
[facility]    Treatment Team Staffing    Patient Name: Donna Morales Program: IOP Date: 4/13/2017     Attendees: Erasto Katz MD, Esem MUÑOZN, Jacklyn Avendano RN [facility]    Patient's Progress toward Goals Listed on the Treatment Plan: Participating well in groups. Attending AA but has not gotten a sponsor.  Working on reaching out more to her peers in AA.    1. Client's Participation When in Attendance Was: Active in a Positive Way    2. Counselor's Evaluation of Client's Progress: Positive Movement    3. Patient is attending group and individual sessions and is progressing well toward the treatment goals: yes      YES NO   A. Relapse During Program []  [x]    B. Requires physician review [x]  []    C. Referral to program inappropriate []  [x]    D. Non compliance with Treatment Plan []  [x]    E. Early treatment termination (lack of attendance) []  [x]     []  []      Comments: Continue to encourage stronger connection with peers in AA.    Treatment Plan Review: - Continue Individual therapy, Group therapy, Intensive Outpatient Program and 12-Step participation

## 2017-04-13 NOTE — PROGRESS NOTES
PSYCHIATRY FOLLOW-UP NOTE      Chief Complaint   Patient presents with   • Follow-Up     alcohol use disorder, anxiety         History Of Present Illness:  Donna Morales is a 36 y.o. old female with HTN, alcohol use disorder comes in today for follow up, was last seen 2 months ago. She was recently hospitalized at [facility] for alcohol detox. She reportedly had problems at school where she was pulled out of her class by her school  and the school security was called. She had some alcohol the night before but denies drinking on the day that this happened. She refused to comply with the school authorities wanted and she was put on administrative leave. She then decided to get help on her own and is currently in the intensive outpatient program for the last 3 weeks. She has been doing good since she started the program and has been sober for about 20 days. She has been going to 3 AA meetings in the week as well. She is back to school and denies any problems at work anymore. Denies cravings for alcohol and is trying to avoid the liquor aisle if she goes to the grocery store. She does not have any alcohol at home and mother has been keeping a close eye on her as well. She denies having a complicated withdrawal when she was hospitalized but did require some Valium for withdrawal symptoms. She was started on Hydroxyzine for anxiety and Topamax for alcohol cravings. She has been taking all of her medications as prescribed and denies any side effects. Sleep continues to be problematic with problems falling and staying asleep. Appetite good. Denies any current depressive symptoms. Energy good. Denies feeling angry or agitated. Denies having thoughts of wanting to hurt herself or others.    Social History:   Employed, has been working as a  for 15 years. Lives alone in [place]. She has good support from her mother who lives close to her. Single currently, never , no  "kids.     Substance Use:  Alcohol - Sober for 20 days  Nicotine - Denies  Illicit drugs - Denies    Past Medication Trials:  Ativan    Medications:  Current Outpatient Prescriptions   Medication Sig Dispense Refill   • topiramate (TOPAMAX) 100 MG Tab Take 1 Tab by mouth 2 times a day. 60 Tab 0   • busPIRone (BUSPAR) 15 MG tablet Take 1 Tab by mouth 2 times a day. 60 Tab 0   • hydrOXYzine (VISTARIL) 50 MG Cap Take 1 Cap by mouth 3 times a day. 90 Cap 0   • naltrexone (DEPADE) 50 MG Tab Take 1 Tab by mouth every day. 30 Tab 0   • trazodone (DESYREL) 100 MG Tab Take 1 Tab by mouth every bedtime. 30 Tab 0   • Cyanocobalamin (B-12) 1000 MCG Cap Take 1,000 mcg by mouth every day. 30 Cap 0   • FLUVIRIN PRESERVATIVE FREE 0.5 ML Suspension Prefilled Syringe   0   • meloxicam (MOBIC) 15 MG tablet   5   • LO LOESTRIN FE 1 MG-10 MCG / 10 MCG Tab   11   • lisinopril-hydrochlorothiazide (PRINZIDE, ZESTORETIC) 20-25 MG per tablet Take 1 Tab by mouth every day.       No current facility-administered medications for this visit.       Review Of Systems:    Constitutional - Negative for fatigue  Respiratory - Negative for shortness of breath, cough  CVS - Negative for chest pain, palpitations  GI - Negative for nausea, vomiting, abdominal pain, diarrhea, constipation  Musculoskeletal - Negative for back pain  Neurological - Negative for headaches  Psychiatric - Positive for anxiety, poor sleep    Physical Examination:  Vital signs: /87 mmHg  Pulse 85  Ht 1.6 m (5' 3\")  Wt 61.236 kg (135 lb)  BMI 23.92 kg/m2    Musculoskeletal: Normal gait. No abnormal movements.     Mental Status Evaluation:   General: Young black female, dressed in casual attire, good grooming and hygiene, in no apparent distress, calm and cooperative, good eye contact, no psychomotor agitation or retardation  Orientation: Alert and oriented to person, place and time  Recent and remote memory: Grossly intact  Attention span and concentration: Grossly " "intact  Speech: Spontaneous, normal rate, rhythm and tone  Thought Process: Linear, logical and goal directed  Thought Content: Denies suicidal or homicidal ideations, intent or plan  Perception: Denies auditory or visual hallucinations. No delusions noted  Associations: Intact  Language: Appropriate  Fund of knowledge and vocabulary: Grossly adequate  Mood: \"fine\"  Affect: Euthymic, mood congruent  Insight: Good  Judgment: Good      Impression:  1. Alcohol use disorder, moderate (sober for 20 days)  2. Unspecified anxiety disorder    Medical Records/Labs/Diagnostic Tests Reviewed:  [facility] records - no controlled medications since June 2016    Plan:  1. Continue Naltrexone 50 mg daily and Topamax 100 mg twice daily for alcohol cravings  2. Continue Buspirone 15 mg twice daily for anxiety  3. Continue Hydroxyzine 50 mg 2-3 times daily for anxiety.  4. Continue Trazodone 100 mg at bedtime for sleep  5. Continue Vitamin B-12 replacement  6. Continue intensive outpatient program  7. Resume individual psychotherapy with Enedina Hairston L.C.S.W after she completes the program  8. Encouraged to maintain sobriety from alcohol. Encouraged to continue attending AA meetings even after she is done with the program in to get a sponsor as well.    Return to clinic in 4 weeks or sooner if symptoms worsen    The proposed treatment plan was discussed with the patient who was provided the opportunity to ask questions and make suggestions regarding alternative treatment. Patient verbalized understanding and expressed agreement with the plan.     Sujey Dunbar M.D.  02/03/2017    This note was created using voice recognition software (Dragon). The accuracy of the dictation is limited by the abilities of the software. I have reviewed the note prior to signing, however some errors in grammar and context are still possible. If you have any questions related to this note please do not hesitate to contact our office.         "

## 2017-04-14 NOTE — BH THERAPY
Attendance: Attended   Attendance Duration (min): Greater than 60  Number of Participants: 5     Program/Group: Intensive Outpatient Program  Topics Covered: Relapse Prevention, Weekend planning  Participation: Active verbal participation: patient shared her week with the group. She reports feeling '' stressed' at work but denies having any urges to use. She reports that she has been involved at [employer] and plans to spend the weekend with family. Client also plans to attend AA meetings. Donna is actively seeking a AA sponsor at this time.   Affect/Mood Range: Normal range  Affect/Mood Display: Congruent w/content  Cognition: Oriented, Alert  Evidence of Imminent Suicide Risk: No  Evidence of imminent homicide risk: No  Therapeutic Interventions: Relapse prevention, Self-care skills  Progress Toward Treatment Goal: Moderate improvement

## 2017-04-14 NOTE — BH THERAPY
Attendance: Attended   Attendance Duration (min): Greater than 60  Number of Participants: 5     Program/Group: Intensive Outpatient Program  Topics Covered: Relapse Prevention, Weekend planning  Participation: Active verbal participation  Affect/Mood Range: Normal range  Affect/Mood Display: Congruent w/content  Cognition: Oriented, Alert  Evidence of Imminent Suicide Risk: No  Evidence of imminent homicide risk: No  Therapeutic Interventions: Relapse prevention, Self-care skills  Progress Toward Treatment Goal: Moderate improvement

## 2017-04-17 ENCOUNTER — HOSPITAL ENCOUNTER (OUTPATIENT)
Dept: BEHAVIORAL HEALTH | Facility: MEDICAL CENTER | Age: 37
End: 2017-04-17
Attending: PSYCHIATRY & NEUROLOGY
Payer: COMMERCIAL

## 2017-04-17 PROCEDURE — 90853 GROUP PSYCHOTHERAPY: CPT

## 2017-04-18 ENCOUNTER — APPOINTMENT (OUTPATIENT)
Dept: BEHAVIORAL HEALTH | Facility: MEDICAL CENTER | Age: 37
End: 2017-04-18
Attending: PSYCHIATRY & NEUROLOGY
Payer: COMMERCIAL

## 2017-04-18 ENCOUNTER — HOSPITAL ENCOUNTER (OUTPATIENT)
Facility: MEDICAL CENTER | Age: 37
End: 2017-04-18
Attending: FAMILY MEDICINE
Payer: COMMERCIAL

## 2017-04-18 LAB
APPEARANCE UR: CLEAR
BACTERIA #/AREA URNS HPF: ABNORMAL /HPF
BILIRUB UR QL STRIP.AUTO: NEGATIVE
COLOR UR: ABNORMAL
CULTURE IF INDICATED INDCX: YES UA CULTURE
EPI CELLS #/AREA URNS HPF: ABNORMAL /HPF
GLUCOSE UR STRIP.AUTO-MCNC: NEGATIVE MG/DL
KETONES UR STRIP.AUTO-MCNC: NEGATIVE MG/DL
LEUKOCYTE ESTERASE UR QL STRIP.AUTO: ABNORMAL
MICRO URNS: ABNORMAL
MUCOUS THREADS #/AREA URNS HPF: ABNORMAL /HPF
NITRITE UR QL STRIP.AUTO: NEGATIVE
PH UR STRIP.AUTO: 7 [PH]
PROT UR QL STRIP: 30 MG/DL
RBC # URNS HPF: ABNORMAL /HPF
RBC UR QL AUTO: NEGATIVE
SP GR UR STRIP.AUTO: 1.01
WBC #/AREA URNS HPF: ABNORMAL /HPF

## 2017-04-18 PROCEDURE — 81001 URINALYSIS AUTO W/SCOPE: CPT

## 2017-04-18 PROCEDURE — 87077 CULTURE AEROBIC IDENTIFY: CPT

## 2017-04-18 PROCEDURE — 87186 SC STD MICRODIL/AGAR DIL: CPT

## 2017-04-18 PROCEDURE — 87086 URINE CULTURE/COLONY COUNT: CPT

## 2017-04-18 NOTE — ADDENDUM NOTE
Encounter addended by: Chary Coronel L.C.S.W. on: 4/18/2017 11:32 AM<BR>     Documentation filed: Notes Section

## 2017-04-18 NOTE — BH THERAPY
Group Therapy Checklist  Attendance: Attended  Attendance Duration (min): 46-60  Number of Participants: 5  Program/Group: Intensive Outpatient Program  Topics Covered: Addiction behaviors  Participation: Active verbal participation, Attentive  Affect/Mood Range: Normal range, Flexible  Affect/Mood Display: Congruent w/content  Cognition: Oriented, Alert  Evidence of Imminent Suicide Risk: No  Evidence of imminent homicide risk: No  Therapeutic Interventions: Cognitive clarification  Progress Toward Treatment Goal: Moderate improvement

## 2017-04-18 NOTE — BH THERAPY
Behavioral Health  Group Therapy Progress Note    Name: Donna Morales  MRN: [id number]  Date: 4/17/2017    Attendance: Attended   Attendance Duration (min): Greater than 60  Number of Participants: 5     Program/Group: Intensive Outpatient Program  Topics Covered: Relapse Prevention, Recovery Planning  Participation: Active verbal participation, Attentive  Affect/Mood Range: Normal range  Affect/Mood Display: Congruent w/content  Cognition: Alert  Evidence of Imminent Suicide Risk: No  Evidence of imminent homicide risk: No  Therapeutic Interventions: Relapse prevention  Progress Toward Treatment Goal: Moderate improvement    Donna shared her weekend with the group. She reports having a ''stressful and busy'' weekend. Donna denied having any triggers or urges to use. She reports attending AA meetings and is actively seeking a sponsor. Expressed difficulties on finding a sponsor with enough years of sobriety. Her goal is to find a sponsor by next week as it will be her last week of IOP. She also expressed interest in attending the after care group.     Treatment Plan: - Transition toward termination     Chary Coronel L.C.S.W.

## 2017-04-20 ENCOUNTER — HOSPITAL ENCOUNTER (OUTPATIENT)
Dept: BEHAVIORAL HEALTH | Facility: MEDICAL CENTER | Age: 37
End: 2017-04-20
Attending: PSYCHIATRY & NEUROLOGY
Payer: COMMERCIAL

## 2017-04-20 PROCEDURE — 90853 GROUP PSYCHOTHERAPY: CPT

## 2017-04-21 LAB
BACTERIA UR CULT: ABNORMAL
SIGNIFICANT IND 70042: ABNORMAL
SOURCE SOURCE: ABNORMAL

## 2017-04-21 NOTE — BH THERAPY
Attendance: Attended   Attendance Duration (min): 45-60 minutes   Number of Participants: 6     Program/Group: Intensive Outpatient Program  Topics Covered: Relapse Prevention  Participation: Active verbal participation, Attentive  Affect/Mood Range: Normal range  Affect/Mood Display: Congruent w/content  Cognition: Alert, Oriented  Evidence of Imminent Suicide Risk: No  Evidence of imminent homicide risk: No  Therapeutic Interventions: Relapse prevention  Progress Toward Treatment Goal: Moderate improvement

## 2017-04-21 NOTE — BH THERAPY
"Behavioral Health  Group Therapy Progress Note    Name: Donna Morales  MRN: [id number]  Date: 4/21/2017    Attendance: Attended   Attendance Duration (min): Greater than 60  Number of Participants: 6     Program/Group: Intensive Outpatient Program  Topics Covered: Relapse Prevention, Acceptance, Weekend planning  Participation: Active verbal participation, Attentive  Affect/Mood Range: Normal range  Affect/Mood Display: Congruent w/content  Cognition: Alert, Oriented  Evidence of Imminent Suicide Risk: No  Evidence of imminent homicide risk: No  Therapeutic Interventions: Problem-solving, Relapse prevention, Self-care skills  Progress Toward Treatment Goal: Moderate improvement    Donna shared her weekend goals with the group. She reports having dinner plans for a friends birthday. She indicated that she continues to find a sponsor. Donna also plans to attend AA meetings this weekend. She denied having any urges to use although she reports feeling '' anxious '' about graduating IOP. Expressed confort in our groups and will miss them once she completes the program.       Treatment Plan: - \"Homework\" recommendation: attend AA meeting      Chary Coronel L.C.S.W.             "

## 2017-04-24 ENCOUNTER — HOSPITAL ENCOUNTER (OUTPATIENT)
Dept: BEHAVIORAL HEALTH | Facility: MEDICAL CENTER | Age: 37
End: 2017-04-24
Attending: PSYCHIATRY & NEUROLOGY
Payer: COMMERCIAL

## 2017-04-24 PROCEDURE — 90853 GROUP PSYCHOTHERAPY: CPT

## 2017-04-25 ENCOUNTER — HOSPITAL ENCOUNTER (OUTPATIENT)
Dept: BEHAVIORAL HEALTH | Facility: MEDICAL CENTER | Age: 37
End: 2017-04-25
Attending: PSYCHIATRY & NEUROLOGY
Payer: COMMERCIAL

## 2017-04-25 DIAGNOSIS — F10.20 ALCOHOL USE DISORDER, MODERATE, DEPENDENCE (HCC): ICD-10-CM

## 2017-04-25 PROCEDURE — 90834 PSYTX W PT 45 MINUTES: CPT

## 2017-04-25 PROCEDURE — 90853 GROUP PSYCHOTHERAPY: CPT

## 2017-04-25 NOTE — CARE PLAN
Problem: Substance Induced Symptoms  Goal: Abstinence  Intervention: Relapse Prevention Plan   [facility]  Therapy Progress Note    Patient Name: Donna Morales  Patient MRN: [id number]  Today's Date: 4/25/2017     Type of session:Individual psychotherapy  Length of session: 50 min  Persons in attendance:Patient    Subjective/New Info: Processed Relapse prevention plan and Recovery Plan.  Good insight to the consequences that her disease has brought into her life. Explored her triggers and slippery situations.  Has an understanding of the importance of a close connection with AA and working with a sponsor.  Agrees that ongoing participation in Aftercare, individual therapy, and active involvement in AA.    Objective/Observations:              Participation: Active verbal participation, Attentive, Engaged and Open to feedback              Grooming: Casual and Neat              Cognition: Alert and Fully Oriented              Eye contact: Good              Mood: Euthymic              Affect: Flexible and Full range              Thought process: Logical and Goal-directed              Speech: Rate within normal limits and Volume within normal limits              Other:     Diagnoses: No diagnosis found.     Current risk:              SUICIDE: Not applicable              Homicide: Not applicable              Self-harm: Not applicable              Relapse: Low              Other:               Safety Plan reviewed? Not Indicated              If evidence of imminent risk is present, intervention/plan:     Therapeutic Intervention(s): Cognitive modification, Communication skills, Conflict resolution skills and Distress tolerance skills    Treatment Goal(s)/Objective(s) addressed: Good insight to the need for ongoing treatment for her recovery plan and active participation in AA, therapy, Sponsorship, and aftercare.     Progress toward Treatment Goals: Moderate improvement    Plan:  - Transition toward  termination complete IOP on 4 27 to follow up with Elvin Raygoza and BRENDA.     Jacklyn Avendano R.N.  4/25/2017

## 2017-04-25 NOTE — BH THERAPY
"Behavioral Health  Group Therapy Progress Note    Name: Donna Morales  MRN: [id number]  Date: 4/24/2017    Attendance: Attended   Attendance Duration (min): Greater than 60  Number of Participants: 7     Program/Group: Intensive Outpatient Program  Topics Covered: Relapse Prevention, Recovery Planning  Participation: Active verbal participation  Affect/Mood Range: Normal range  Affect/Mood Display: Congruent w/content  Cognition: Alert, Oriented  Evidence of Imminent Suicide Risk: No  Evidence of imminent homicide risk: No  Therapeutic Interventions: Relapse prevention, Problem-solving  Progress Toward Treatment Goal: Moderate improvement    Donna shared her week with the group. She reports feeling ''sad'' due to a recent loss of a co-worker, she processed her feelings and thoughts with the group. Donna was able to relate to another group member and processed her feelings using a quote used from the other group member. Donna has also been attending [employer] and AA meetings     Treatment Plan: - \"Homework\" recommendation: attend AA meeting      Chary Coronel L.C.S.W.             "

## 2017-04-26 ENCOUNTER — TELEPHONE (OUTPATIENT)
Dept: BEHAVIORAL HEALTH | Facility: MEDICAL CENTER | Age: 37
End: 2017-04-26

## 2017-04-26 NOTE — BH THERAPY
"Behavioral Health  Group Therapy Progress Note    Name: Donna Morales  MRN: [id number]  Date: 4/25/2017    Attendance: Attended   Attendance Duration (min): Greater than 60  Number of Participants: 8     Program/Group: Intensive Outpatient Program  Topics Covered: Mindfulness  Participation: Active verbal participation  Affect/Mood Range: Normal range, Flexible  Affect/Mood Display: Congruent w/content  Cognition: Alert, Oriented  Evidence of Imminent Suicide Risk: No  Evidence of imminent homicide risk: No  Therapeutic Interventions: Socialization, Relapse prevention, Problem-solving  Progress Toward Treatment Goal: Moderate improvement     Donna shared her day with the group. She reports that she has been busy with work and has not had any changes since yesterday. She denies having any triggers at this time.     Treatment Plan: - \"Homework\" recommendation: mindfullness      ZAINAB Albrecht.S.W.             "

## 2017-04-26 NOTE — BH THERAPY
Behavioral Health  Group Therapy Progress Note    Name: Donna Morales  MRN: [id number]  Date: 4/25/2017    Attendance: Attended   Attendance Duration (min): 46-60  Number of Participants: 8     Program/Group: Intensive Outpatient Program  Topics Covered: Mindful practice  Participation: Active verbal participation, Attentive  Affect/Mood Range: Normal range  Affect/Mood Display: Congruent w/content  Cognition: Alert, Oriented  Evidence of Imminent Suicide Risk: No  Evidence of imminent homicide risk: No  Therapeutic Interventions: Self-care skills, Mindfulness exercise  Progress Toward Treatment Goal: Moderate improvement           Chary Coronel L.C.S.W.

## 2017-04-26 NOTE — TELEPHONE ENCOUNTER
[facility]  TRANSFER/DISCHARGE SUMMARY FORM    HHPI / SCP: HHP Other Ins.:      Patient Name: Donna Morales  Admission Date: 3/27/17  Level of Care Attended:  Intens.OP : 1980  Transfer/Discharge Date: MRN: [id number]  17       SIGNIFICANT FINDINGS/CLINICAL IMPRESSION:   DSM Codes:   f 10.20    ICD10 Codes:   No diagnosis found.    Additional problems identified via assessment: Donna has had relapses in the past.      Treatment Components in Which Patient Participated (check all that apply):  Education group(s), 1:1 teaching/therapy, Group Therapy, CD Rehab and 12-Step Group(s)    Summary of Course of Treatment: Attending groups with good participation.  Attending AA and is looking for a sponsor.    Condition at Time of Transfer/Discharge: Met treatment goals.    [x] Medications Reviewed with Copy to Patient    Referred to: Refer to [facility]: Outpatient Therapy To follow up with Enedina Hairston, aftercare, and AA.  Patient is in agreement with discharge plan: yes    Jacklyn Avendano R.N.

## 2017-04-27 ENCOUNTER — HOSPITAL ENCOUNTER (OUTPATIENT)
Dept: BEHAVIORAL HEALTH | Facility: MEDICAL CENTER | Age: 37
End: 2017-04-27
Attending: PSYCHIATRY & NEUROLOGY
Payer: COMMERCIAL

## 2017-04-27 PROCEDURE — 90853 GROUP PSYCHOTHERAPY: CPT

## 2017-04-28 NOTE — BH THERAPY
Behavioral Health  Group Therapy Progress Note    Name: Donna Morales  MRN: [id number]  Date: 4/27/2017    Attendance: Attended   Attendance Duration (min): Greater than 60  Number of Participants: 8     Program/Group: Intensive Outpatient Program  Topics Covered: Relapse Prevention  Participation: Active verbal participation  Affect/Mood Range: Normal range, Flexible  Affect/Mood Display: Congruent w/content  Cognition: Alert, Oriented  Evidence of Imminent Suicide Risk: No  Evidence of imminent homicide risk: No  Therapeutic Interventions: Relapse prevention  Progress Toward Treatment Goal: Moderate improvement    Donna shared her final thoughts in regards to her group experience. She was positive and reports feeling comfortable in group. She is thankful to have met other other group members and plans to attend after care and go to [facility] weekly.     Treatment Plan: - Termination of IOP     Chary Coronel L.C.S.W.

## 2017-04-28 NOTE — BH THERAPY
Attendance: Attended   Attendance Duration (min): 46-60  Number of Participants: 8     Program/Group: Intensive Outpatient Program  Topics Covered: Grief & Loss  Participation: Active verbal participation  Affect/Mood Range: Normal range  Affect/Mood Display: Congruent w/content  Cognition: Alert, Oriented  Evidence of Imminent Suicide Risk: No  Evidence of imminent homicide risk: No  Therapeutic Interventions: Socialization, Problem-solving  Progress Toward Treatment Goal: Moderate improvement

## 2017-05-11 RX ORDER — TRAZODONE HYDROCHLORIDE 100 MG/1
TABLET ORAL
Qty: 30 TAB | Refills: 0 | Status: SHIPPED | OUTPATIENT
Start: 2017-05-11 | End: 2017-06-15 | Stop reason: SDUPTHER

## 2017-05-15 ENCOUNTER — APPOINTMENT (OUTPATIENT)
Dept: BEHAVIORAL HEALTH | Facility: PHYSICIAN GROUP | Age: 37
End: 2017-05-15

## 2017-05-15 RX ORDER — TOPIRAMATE 100 MG/1
TABLET, FILM COATED ORAL
Qty: 60 TAB | Refills: 0 | Status: SHIPPED | OUTPATIENT
Start: 2017-05-15 | End: 2017-06-15 | Stop reason: SDUPTHER

## 2017-06-15 RX ORDER — TOPIRAMATE 100 MG/1
TABLET, FILM COATED ORAL
Qty: 60 TAB | Refills: 0 | Status: SHIPPED | OUTPATIENT
Start: 2017-06-15 | End: 2017-07-19 | Stop reason: SDUPTHER

## 2017-06-15 RX ORDER — TRAZODONE HYDROCHLORIDE 100 MG/1
TABLET ORAL
Qty: 30 TAB | Refills: 0 | Status: SHIPPED | OUTPATIENT
Start: 2017-06-15 | End: 2017-07-19 | Stop reason: SDUPTHER

## 2017-06-20 ENCOUNTER — APPOINTMENT (OUTPATIENT)
Dept: BEHAVIORAL HEALTH | Facility: PHYSICIAN GROUP | Age: 37
End: 2017-06-20

## 2017-06-22 RX ORDER — CHOLECALCIFEROL (VITAMIN D3) 25 MCG
TABLET,CHEWABLE ORAL
Qty: 90 CAP | Refills: 0 | Status: SHIPPED | OUTPATIENT
Start: 2017-06-22 | End: 2018-01-11 | Stop reason: SDUPTHER

## 2017-06-26 ENCOUNTER — OFFICE VISIT (OUTPATIENT)
Dept: BEHAVIORAL HEALTH | Facility: PHYSICIAN GROUP | Age: 37
End: 2017-06-26
Payer: COMMERCIAL

## 2017-06-26 DIAGNOSIS — F10.20 ALCOHOL USE DISORDER, MODERATE, DEPENDENCE (HCC): ICD-10-CM

## 2017-06-26 PROCEDURE — 90834 PSYTX W PT 45 MINUTES: CPT | Performed by: SOCIAL WORKER

## 2017-06-26 NOTE — BH THERAPY
" [facility]  Therapy Progress Note    Patient Name: Donna Morales  Patient MRN: [id number]  Today's Date: 6/26/2017     Type of session:Individual psychotherapy  Length of session: 45 minutes  Persons in attendance:Patient    Subjective/New Info: Pt returning to [facility] tx after completing IOP.  States that sobriety is still a \"daily struggle.\"  Knows that unstructured time is risky.  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  Also learning to avoid situations where social drinking occurs, and is getting better at telling friends she does not drink.  Needs someone to accompany her to grocery store.  Going to Aftercare and AA meetings.  Encouraged pt to talk about her struggles.  Pt concerned that principal knows about her addiction, but also grateful to still have her job.    Objective/Observations:   Participation: Active verbal participation   Grooming: Casual and Neat   Cognition: Alert and Fully Oriented   Eye contact: Good   Mood: Mildly anxious   Affect: Flexible and Congruent with content   Thought process: Logical and Goal-directed   Speech: Rate within normal limits and Volume within normal limits   Other:     Diagnoses:   1. Alcohol use disorder, moderate, dependence (CMS-HCC)         Current risk:   SUICIDE: Low   Homicide: Low   Self-harm: Low   Relapse: Low   Other:    Safety Plan reviewed? No   If evidence of imminent risk is present, intervention/plan:     Therapeutic Intervention(s): Positive behavior reinforced, Psychoeducation RE: strategies for early recovery, Stressors assessed and Supportive psychotherapy    Treatment Goal(s)/Objective(s) addressed: Maintaining abstinence from alcohol     Progress toward Treatment Goals: Moderate improvement    Plan:  - Continue Individual therapy, Medication management and Community support group - AA, with sponsor, and Aftercare    Enedina Hairston L.C.S.W.  6/26/2017                                   "

## 2017-07-19 RX ORDER — TOPIRAMATE 100 MG/1
TABLET, FILM COATED ORAL
Qty: 60 TAB | Refills: 0 | Status: SHIPPED | OUTPATIENT
Start: 2017-07-19 | End: 2017-08-19 | Stop reason: SDUPTHER

## 2017-07-19 RX ORDER — TRAZODONE HYDROCHLORIDE 100 MG/1
100 TABLET ORAL NIGHTLY PRN
Qty: 30 TAB | Refills: 0 | Status: SHIPPED | OUTPATIENT
Start: 2017-07-19 | End: 2017-08-19 | Stop reason: SDUPTHER

## 2017-08-21 RX ORDER — TOPIRAMATE 100 MG/1
TABLET, FILM COATED ORAL
Qty: 60 TAB | Refills: 0 | Status: SHIPPED | OUTPATIENT
Start: 2017-08-21 | End: 2017-09-23 | Stop reason: SDUPTHER

## 2017-08-21 RX ORDER — TRAZODONE HYDROCHLORIDE 100 MG/1
TABLET ORAL
Qty: 30 TAB | Refills: 0 | Status: SHIPPED | OUTPATIENT
Start: 2017-08-21 | End: 2017-09-19 | Stop reason: SDUPTHER

## 2017-09-25 RX ORDER — TOPIRAMATE 100 MG/1
TABLET, FILM COATED ORAL
Qty: 60 TAB | Refills: 1 | Status: SHIPPED | OUTPATIENT
Start: 2017-09-25 | End: 2017-10-18 | Stop reason: SDUPTHER

## 2017-10-03 ENCOUNTER — APPOINTMENT (OUTPATIENT)
Dept: BEHAVIORAL HEALTH | Facility: PHYSICIAN GROUP | Age: 37
End: 2017-10-03

## 2017-10-12 ENCOUNTER — OFFICE VISIT (OUTPATIENT)
Dept: URGENT CARE | Facility: PHYSICIAN GROUP | Age: 37
End: 2017-10-12
Payer: COMMERCIAL

## 2017-10-12 VITALS
OXYGEN SATURATION: 100 % | RESPIRATION RATE: 16 BRPM | WEIGHT: 141 LBS | HEIGHT: 63 IN | BODY MASS INDEX: 24.98 KG/M2 | TEMPERATURE: 98.7 F | HEART RATE: 79 BPM | DIASTOLIC BLOOD PRESSURE: 98 MMHG | SYSTOLIC BLOOD PRESSURE: 150 MMHG

## 2017-10-12 DIAGNOSIS — J20.8 VIRAL BRONCHITIS: ICD-10-CM

## 2017-10-12 PROCEDURE — 99204 OFFICE O/P NEW MOD 45 MIN: CPT | Performed by: FAMILY MEDICINE

## 2017-10-12 RX ORDER — AZITHROMYCIN 250 MG/1
TABLET, FILM COATED ORAL
Qty: 6 TAB | Refills: 0 | Status: SHIPPED | OUTPATIENT
Start: 2017-10-12 | End: 2018-01-11

## 2017-10-12 ASSESSMENT — ENCOUNTER SYMPTOMS
VOMITING: 0
MYALGIAS: 0
FEVER: 0
PALPITATIONS: 0
SORE THROAT: 1
CHILLS: 0
COUGH: 1
DIARRHEA: 0
WHEEZING: 0
SPUTUM PRODUCTION: 1
SHORTNESS OF BREATH: 0
ABDOMINAL PAIN: 0
NAUSEA: 0
EYE DISCHARGE: 0

## 2017-10-12 NOTE — PROGRESS NOTES
Subjective:      Donna Morales is a 37 y.o. female who presents with Cough (cough, loss of voice x1-2 weeks)            Subjective:     Donna Morales is a 37 y.o. female here for evaluation of a cough.  The cough is non-productive, without wheezing, dyspnea or hemoptysis, productive of green/yellow sputum, waxing and waning over time and is aggravated by laughing and deep breath . Onset of symptoms was 4 days ago, unchanged since that time.  Associated symptoms include postnasal drip, fever. Patient does not have a history of asthma. She has flu shot las week and started symptoms after flu shot.                Review of Systems   Constitutional: Negative for chills and fever.   HENT: Positive for congestion and sore throat. Negative for ear pain.    Eyes: Negative for discharge.   Respiratory: Positive for cough and sputum production. Negative for shortness of breath and wheezing.    Cardiovascular: Negative for chest pain and palpitations.   Gastrointestinal: Negative for abdominal pain, diarrhea, nausea and vomiting.   Genitourinary: Negative for dysuria.   Musculoskeletal: Negative for myalgias.   Skin: Negative for itching and rash.       PMH:  has a past medical history of Alcoholism (CMS-HCC); Anemia; Anesthesia; Anxiety; Dialysis (2008); Heart burn; Hypertension; Kidney failure (2008); Liver failure (CMS-[facility]); Other specified symptom associated with female genital organs; Pancreatitis; and Wrist pain.  MEDS:   Current Outpatient Prescriptions:   •  LO LOESTRIN FE 1 MG-10 MCG / 10 MCG Tab, , Disp: , Rfl: 11  •  lisinopril-hydrochlorothiazide (PRINZIDE, ZESTORETIC) 20-25 MG per tablet, Take 1 Tab by mouth every day., Disp: , Rfl:   •  topiramate (TOPAMAX) 100 MG Tab, TAKE 1 TABLET BY MOUTH TWICE DAILY, Disp: 60 Tab, Rfl: 1  •  trazodone (DESYREL) 100 MG Tab, TAKE 1 TABLET BY MOUTH AT BEDTIME AS NEEDED FOR SLEEP, Disp: 30 Tab, Rfl: 1  •  Cyanocobalamin (B-12) 1000 MCG Cap, TAKE 1 CAPSULE BY  "MOUTH EVERY DAY, Disp: 90 Cap, Rfl: 0  •  busPIRone (BUSPAR) 15 MG tablet, Take 1 Tab by mouth 2 times a day., Disp: 60 Tab, Rfl: 0  •  hydrOXYzine (VISTARIL) 50 MG Cap, Take 1 Cap by mouth 3 times a day., Disp: 90 Cap, Rfl: 0  •  naltrexone (DEPADE) 50 MG Tab, Take 1 Tab by mouth every day., Disp: 30 Tab, Rfl: 0  •  FLUVIRIN PRESERVATIVE FREE 0.5 ML Suspension Prefilled Syringe, , Disp: , Rfl: 0  •  meloxicam (MOBIC) 15 MG tablet, , Disp: , Rfl: 5  ALLERGIES:   Allergies   Allergen Reactions   • Nkda [No Known Drug Allergy]      SURGHX:   Past Surgical History:   Procedure Laterality Date   • FLEXOR TENDON REPAIR  7/18/2012    Performed by MAI BRITO at [facility]S   • AMANDA BY LAPAROSCOPY  2006   • LAPAROSCOPY  2000    endometriosis     SOCHX:  reports that she has never smoked. She has never used smokeless tobacco. She reports that she drinks about 36.0 oz of alcohol per week . She reports that she does not use drugs.  FH: Family history was reviewed, no pertinent findings to report       Objective:     /98   Pulse 79   Temp 37.1 °C (98.7 °F)   Resp 16   Ht 1.6 m (5' 3\")   Wt 64 kg (141 lb)   SpO2 100%   BMI 24.98 kg/m²      Physical Exam   Constitutional: She is oriented to person, place, and time. She appears well-developed and well-nourished.   HENT:   Head: Normocephalic.   Right Ear: External ear normal.   Left Ear: External ear normal.   Eyes: EOM are normal. Pupils are equal, round, and reactive to light. Right eye exhibits no discharge. Left eye exhibits no discharge.   Neck: Normal range of motion. Neck supple.   Cardiovascular: Normal rate, regular rhythm and normal heart sounds.    Pulmonary/Chest: Effort normal and breath sounds normal. No respiratory distress.   Abdominal: Soft. Bowel sounds are normal. She exhibits no distension.   Lymphadenopathy:     She has no cervical adenopathy.   Neurological: She is alert and oriented to person, place, and time.   Skin: Skin " is warm and dry.               Assessment/Plan:   Assessment:    viral Bronchitis - Acute     Plan:    Decongestants, tessalon pearls. Abx with contingent plan that if symptoms worsen in next 48 hrs, she should only then fill the abx.   recommended salt water gargles for sore throat  Differential diagnoses, natural history, supportive care discussed  Indications for immediate care in an emergency department, when to return to the urgent care, and when to follow up with primary care provider discussed at length. Patient espressed understanding and agreed to do so.

## 2017-10-17 ENCOUNTER — OFFICE VISIT (OUTPATIENT)
Dept: BEHAVIORAL HEALTH | Facility: PHYSICIAN GROUP | Age: 37
End: 2017-10-17
Payer: COMMERCIAL

## 2017-10-17 DIAGNOSIS — F10.20 ALCOHOL USE DISORDER, MODERATE, DEPENDENCE (HCC): ICD-10-CM

## 2017-10-17 PROCEDURE — 90834 PSYTX W PT 45 MINUTES: CPT | Performed by: SOCIAL WORKER

## 2017-10-18 ENCOUNTER — TELEPHONE (OUTPATIENT)
Dept: BEHAVIORAL HEALTH | Facility: PHYSICIAN GROUP | Age: 37
End: 2017-10-18

## 2017-10-18 RX ORDER — HYDROXYZINE PAMOATE 50 MG/1
50 CAPSULE ORAL 3 TIMES DAILY
Qty: 90 CAP | Refills: 1 | Status: SHIPPED | OUTPATIENT
Start: 2017-10-18 | End: 2018-01-11

## 2017-10-18 RX ORDER — TOPIRAMATE 100 MG/1
100 TABLET, FILM COATED ORAL DAILY
Qty: 60 TAB | Refills: 1 | Status: SHIPPED | OUTPATIENT
Start: 2017-10-18 | End: 2017-12-08

## 2017-10-18 RX ORDER — TRAZODONE HYDROCHLORIDE 100 MG/1
100 TABLET ORAL
Qty: 30 TAB | Refills: 1 | Status: SHIPPED | OUTPATIENT
Start: 2017-10-18 | End: 2017-12-08

## 2017-10-18 RX ORDER — BUSPIRONE HYDROCHLORIDE 15 MG/1
15 TABLET ORAL 2 TIMES DAILY
Qty: 60 TAB | Refills: 1 | Status: SHIPPED | OUTPATIENT
Start: 2017-10-18 | End: 2018-01-11

## 2017-12-12 ENCOUNTER — HOSPITAL ENCOUNTER (OUTPATIENT)
Dept: LAB | Facility: MEDICAL CENTER | Age: 37
End: 2017-12-12
Attending: OBSTETRICS & GYNECOLOGY
Payer: COMMERCIAL

## 2017-12-12 PROCEDURE — 87491 CHLMYD TRACH DNA AMP PROBE: CPT

## 2017-12-12 PROCEDURE — 84443 ASSAY THYROID STIM HORMONE: CPT

## 2017-12-12 PROCEDURE — 87591 N.GONORRHOEAE DNA AMP PROB: CPT

## 2017-12-12 PROCEDURE — 87340 HEPATITIS B SURFACE AG IA: CPT

## 2017-12-12 PROCEDURE — 87389 HIV-1 AG W/HIV-1&-2 AB AG IA: CPT

## 2017-12-12 PROCEDURE — 86706 HEP B SURFACE ANTIBODY: CPT

## 2017-12-12 PROCEDURE — 86780 TREPONEMA PALLIDUM: CPT

## 2017-12-12 PROCEDURE — 86803 HEPATITIS C AB TEST: CPT

## 2017-12-12 PROCEDURE — 36415 COLL VENOUS BLD VENIPUNCTURE: CPT

## 2017-12-13 LAB
HBV SURFACE AB SERPL IA-ACNC: <3.1 MIU/ML (ref 0–10)
HBV SURFACE AG SER QL: NEGATIVE
HCV AB SER QL: NEGATIVE
HIV 1+2 AB+HIV1 P24 AG SERPL QL IA: NON REACTIVE
TREPONEMA PALLIDUM IGG+IGM AB [PRESENCE] IN SERUM OR PLASMA BY IMMUNOASSAY: NON REACTIVE
TSH SERPL DL<=0.005 MIU/L-ACNC: 2.34 UIU/ML (ref 0.3–3.7)

## 2017-12-14 LAB
C TRACH DNA SPEC QL NAA+PROBE: NEGATIVE
N GONORRHOEA DNA SPEC QL NAA+PROBE: POSITIVE
SPECIMEN SOURCE: ABNORMAL

## 2018-01-09 ENCOUNTER — APPOINTMENT (OUTPATIENT)
Dept: BEHAVIORAL HEALTH | Facility: PHYSICIAN GROUP | Age: 38
End: 2018-01-09

## 2018-01-11 ENCOUNTER — OFFICE VISIT (OUTPATIENT)
Dept: BEHAVIORAL HEALTH | Facility: PHYSICIAN GROUP | Age: 38
End: 2018-01-11
Payer: COMMERCIAL

## 2018-01-11 ENCOUNTER — HOSPITAL ENCOUNTER (OUTPATIENT)
Dept: LAB | Facility: MEDICAL CENTER | Age: 38
End: 2018-01-11
Attending: PSYCHIATRY & NEUROLOGY
Payer: COMMERCIAL

## 2018-01-11 VITALS
HEIGHT: 63 IN | WEIGHT: 135 LBS | BODY MASS INDEX: 23.92 KG/M2 | HEART RATE: 111 BPM | DIASTOLIC BLOOD PRESSURE: 87 MMHG | SYSTOLIC BLOOD PRESSURE: 114 MMHG

## 2018-01-11 DIAGNOSIS — F10.20 ALCOHOL USE DISORDER, MODERATE, DEPENDENCE (HCC): ICD-10-CM

## 2018-01-11 DIAGNOSIS — F51.04 CHRONIC INSOMNIA: ICD-10-CM

## 2018-01-11 DIAGNOSIS — F41.9 ANXIETY DISORDER, UNSPECIFIED TYPE: ICD-10-CM

## 2018-01-11 DIAGNOSIS — E53.8 B12 DEFICIENCY: ICD-10-CM

## 2018-01-11 LAB
ALBUMIN SERPL BCP-MCNC: 4.3 G/DL (ref 3.2–4.9)
ALBUMIN/GLOB SERPL: 1.3 G/DL
ALP SERPL-CCNC: 84 U/L (ref 30–99)
ALT SERPL-CCNC: 31 U/L (ref 2–50)
ANION GAP SERPL CALC-SCNC: 20 MMOL/L (ref 0–11.9)
AST SERPL-CCNC: 150 U/L (ref 12–45)
BILIRUB SERPL-MCNC: 0.5 MG/DL (ref 0.1–1.5)
BUN SERPL-MCNC: 10 MG/DL (ref 8–22)
CALCIUM SERPL-MCNC: 8.9 MG/DL (ref 8.5–10.5)
CHLORIDE SERPL-SCNC: 97 MMOL/L (ref 96–112)
CO2 SERPL-SCNC: 21 MMOL/L (ref 20–33)
CREAT SERPL-MCNC: 0.99 MG/DL (ref 0.5–1.4)
ERYTHROCYTE [DISTWIDTH] IN BLOOD BY AUTOMATED COUNT: 50.4 FL (ref 35.9–50)
GLOBULIN SER CALC-MCNC: 3.4 G/DL (ref 1.9–3.5)
GLUCOSE SERPL-MCNC: 105 MG/DL (ref 65–99)
HCT VFR BLD AUTO: 36.7 % (ref 37–47)
HGB BLD-MCNC: 12.1 G/DL (ref 12–16)
MCH RBC QN AUTO: 29.8 PG (ref 27–33)
MCHC RBC AUTO-ENTMCNC: 33 G/DL (ref 33.6–35)
MCV RBC AUTO: 90.4 FL (ref 81.4–97.8)
PLATELET # BLD AUTO: 155 K/UL (ref 164–446)
PMV BLD AUTO: 10.9 FL (ref 9–12.9)
POTASSIUM SERPL-SCNC: 3.7 MMOL/L (ref 3.6–5.5)
PROT SERPL-MCNC: 7.7 G/DL (ref 6–8.2)
RBC # BLD AUTO: 4.06 M/UL (ref 4.2–5.4)
SODIUM SERPL-SCNC: 138 MMOL/L (ref 135–145)
WBC # BLD AUTO: 3.9 K/UL (ref 4.8–10.8)

## 2018-01-11 PROCEDURE — 90833 PSYTX W PT W E/M 30 MIN: CPT | Performed by: PSYCHIATRY & NEUROLOGY

## 2018-01-11 PROCEDURE — 85027 COMPLETE CBC AUTOMATED: CPT

## 2018-01-11 PROCEDURE — 82746 ASSAY OF FOLIC ACID SERUM: CPT

## 2018-01-11 PROCEDURE — 80053 COMPREHEN METABOLIC PANEL: CPT

## 2018-01-11 PROCEDURE — 36415 COLL VENOUS BLD VENIPUNCTURE: CPT

## 2018-01-11 PROCEDURE — 82607 VITAMIN B-12: CPT

## 2018-01-11 PROCEDURE — 99214 OFFICE O/P EST MOD 30 MIN: CPT | Performed by: PSYCHIATRY & NEUROLOGY

## 2018-01-11 PROCEDURE — 84425 ASSAY OF VITAMIN B-1: CPT

## 2018-01-11 RX ORDER — CHOLECALCIFEROL (VITAMIN D3) 25 MCG
1 TABLET,CHEWABLE ORAL
Qty: 90 CAP | Refills: 3 | Status: SHIPPED | OUTPATIENT
Start: 2018-01-11 | End: 2019-03-01

## 2018-01-11 RX ORDER — TOPIRAMATE 100 MG/1
100 TABLET, FILM COATED ORAL 2 TIMES DAILY
Qty: 60 TAB | Refills: 1 | Status: SHIPPED | OUTPATIENT
Start: 2018-01-11 | End: 2019-04-16 | Stop reason: SDUPTHER

## 2018-01-11 RX ORDER — NALTREXONE HYDROCHLORIDE 50 MG/1
50 TABLET, FILM COATED ORAL DAILY
Qty: 30 TAB | Refills: 1 | Status: SHIPPED | OUTPATIENT
Start: 2018-01-11 | End: 2019-04-16

## 2018-01-11 RX ORDER — FLUOXETINE HYDROCHLORIDE 20 MG/1
20 CAPSULE ORAL DAILY
Qty: 30 CAP | Refills: 1 | Status: SHIPPED | OUTPATIENT
Start: 2018-01-11 | End: 2019-01-19

## 2018-01-11 RX ORDER — TRAZODONE HYDROCHLORIDE 100 MG/1
100 TABLET ORAL NIGHTLY PRN
Qty: 30 TAB | Refills: 1 | Status: ON HOLD | OUTPATIENT
Start: 2018-01-11 | End: 2019-01-18

## 2018-01-11 NOTE — PROGRESS NOTES
PSYCHIATRY FOLLOW-UP NOTE      Chief Complaint   Patient presents with   • Other     alcohol addiction   • Anxiety         History Of Present Illness:  Donna Morales is a 37 y.o. old female with HTN, alcohol use disorder comes in today for follow up, was last seen over 8 months ago. She has canceled or no showed several appointments with me and a therapist. Since her last appointment she completed the intensive outpatient program for alcohol rehabilitation. However, she has been having periods of drinking since she last saw me. She states that she has not been on any of her medications since July or August. Her medications were being refilled by me on a regular basis but she never picked up her medications. She was extremely tearful today regarding her alcohol use. She has been drinking almost every day but periods of excessive drinking during holidays and when school is out. She did get when she completed the rehabilitation program but relapsed on her birthday. She did feel that she felt better when she was on a combination of Naltrexone and Topamax. She is unable to tell how much she drinks on a regular basis but did tell me that a 750 mL of vodka bottle last for 2 days. Her last alcohol use was yesterday and she was feeling nauseous during her appointment today. She drinks both at home and outside with friends. She does not go to AA meetings as some of her [employer] members go there and she is embarrassed of meeting them up there. She is not open with her friends about her alcohol use. She did not have any problems at school the later half of the year. She is looking forward to going back to school next week and is also thinking about going to [employer] for a master's degree in counseling. She feels that whenever she is busy she does well in regards to her alcohol use. She has not been compliant with her antihypertensives as well. Sleep has been poor with problems with falling and staying asleep. She continues  "to endorse anxiety but unable to describe her anxiety at all. She is not sure if anxiety drives her to drinking or alcohol makes her anxious. She has been feeling sad because of her alcohol use. Denies having active or passive thoughts of wanting to hurt herself or others.    Social History:   Employed, has been working as a  for 15 years. Lives alone in [place]. She has good support from her mother who lives close to her. Single currently, never , no kids.     Substance Use:  Alcohol - See HPI  Nicotine - Denies  Illicit drugs - Denies    Past Medication Trials:  Ativan, Buspirone, Hydroxyzine     Medications:  Current Outpatient Prescriptions   Medication Sig Dispense Refill   • naltrexone (DEPADE) 50 MG Tab Take 1 Tab by mouth every day. 30 Tab 1   • topiramate (TOPAMAX) 100 MG Tab Take 1 Tab by mouth 2 times a day. 60 Tab 1   • trazodone (DESYREL) 100 MG Tab Take 1 Tab by mouth at bedtime as needed for Sleep. 30 Tab 1   • fluoxetine (PROZAC) 20 MG Cap Take 1 Cap by mouth every day. 30 Cap 1   • Cyanocobalamin (B-12) 1000 MCG Cap Take 1 Cap by mouth every day. 90 Cap 3   • LO LOESTRIN FE 1 MG-10 MCG / 10 MCG Tab   11   • lisinopril-hydrochlorothiazide (PRINZIDE, ZESTORETIC) 20-25 MG per tablet Take 1 Tab by mouth every day.       No current facility-administered medications for this visit.        Review Of Systems:    Constitutional - Positive for fatigue  Respiratory - Negative for shortness of breath, cough  CVS - Negative for chest pain, palpitations  GI - Negative for nausea, vomiting, abdominal pain, diarrhea, constipation  Musculoskeletal - Negative for back pain  Neurological - Negative for headaches  Psychiatric - Positive for anxiety, poor sleep, excessive alcohol use     Physical Examination:  Vital signs: /87   Pulse (!) 111   Ht 1.6 m (5' 3\")   Wt 61.2 kg (135 lb)   BMI 23.91 kg/m²     Musculoskeletal: Normal gait. No abnormal movements.     Mental Status Evaluation: " "  General: Young black female, tearful, dressed in casual attire, good grooming and hygiene, in no apparent distress, calm and cooperative, intermittent eye contact, no psychomotor agitation or retardation  Orientation: Alert and oriented to person, place and time  Recent and remote memory: Grossly intact  Attention span and concentration: Grossly intact  Speech: Spontaneous, normal rate, rhythm and tone  Thought Process: Linear, logical and goal directed  Thought Content: Denies suicidal or homicidal ideations, intent or plan  Perception: Denies auditory or visual hallucinations. No delusions noted  Associations: Intact  Language: Appropriate  Fund of knowledge and vocabulary: Grossly adequate  Mood: \"fine\"  Affect: Euthymic, mood congruent  Insight: Good  Judgment: Good      Impression:  1. Alcohol use disorder, moderate  2. Unspecified anxiety disorder  3. Vitamin B12 deficiency   4. Chronic insomnia secondary to alcohol     Medical Records/Labs/Diagnostic Tests Reviewed:  [facility] records - no prescribed controlled medications in the last 1 year    Plan:  1. Restart Naltrexone 50 mg daily and Topamax 100 mg twice daily for alcohol cravings. Discussed Campral and Antabuse which have better data and efficacy for alcohol use disorder but she is not interested in either medication at this time.  2. Discontinue Buspirone and Hydroxyzine  3. Start Prozac 20 mg daily for anxiety. Informed her that SSRIs are not as efficacious along with heavy alcohol consumption. Advised her that she should focus on her alcohol addiction to better understand her anxiety. However, she would like to be on a medication for her anxiety at this time. Discussed side effects including nausea/vomiting, abdominal discomfort/pain, diarrhea, headaches, drowsiness, sleep disturbances, initial worsening of anxiety symptoms, decreased libido, difficulty achieving orgasm etc.  4. Restart Trazodone 100 mg at bedtime for sleep  5. Restart Vitamin B-12 " replacement 1000 µg capsule daily  6. Will check CBC, CMP, B12, B1 and folic acid given chronic heavy alcohol use  7. Discussed at length importance of taking care of her mental health and keeping up with her appointments with me and her therapist - Enedina Hairston. Encouraged her to start going to AA meetings again and to work on getting a sponsor.  8. Provided supportive psychotherapy and motivation interviewing (> 16 minutes). Discussed shame and guilt associated with her drinking and reconnecting with AA.    Return to clinic in 6 weeks or sooner if symptoms worsen    The proposed treatment plan was discussed with the patient who was provided the opportunity to ask questions and make suggestions regarding alternative treatment. Patient verbalized understanding and expressed agreement with the plan.     Total face-to-face time: 30 minutes  More than 50% of face-to-face time was spent in counseling and coordinating care. Discussed long term consequences of alcohol addiction.     Sujey Dunbar M.D.  01/11/18    This note was created using voice recognition software (Dragon). The accuracy of the dictation is limited by the abilities of the software. I have reviewed the note prior to signing, however some errors in grammar and context are still possible. If you have any questions related to this note please do not hesitate to contact our office.

## 2018-01-12 ENCOUNTER — TELEPHONE (OUTPATIENT)
Dept: BEHAVIORAL HEALTH | Facility: PHYSICIAN GROUP | Age: 38
End: 2018-01-12

## 2018-01-12 LAB
FOLATE SERPL-MCNC: 4.8 NG/ML
VIT B12 SERPL-MCNC: 361 PG/ML (ref 211–911)

## 2018-01-13 NOTE — TELEPHONE ENCOUNTER
Called twice today to discuss lab results. No answer, left voicemail both times to call my office to discuss the results.

## 2018-01-16 ENCOUNTER — TELEPHONE (OUTPATIENT)
Dept: BEHAVIORAL HEALTH | Facility: PHYSICIAN GROUP | Age: 38
End: 2018-01-16

## 2018-01-16 ENCOUNTER — HOSPITAL ENCOUNTER (OUTPATIENT)
Dept: LAB | Facility: MEDICAL CENTER | Age: 38
End: 2018-01-16
Attending: OBSTETRICS & GYNECOLOGY
Payer: COMMERCIAL

## 2018-01-16 DIAGNOSIS — E51.9 THIAMINE DEFICIENCY: ICD-10-CM

## 2018-01-16 LAB
C TRACH DNA SPEC QL NAA+PROBE: NEGATIVE
N GONORRHOEA DNA SPEC QL NAA+PROBE: NEGATIVE
SPECIMEN SOURCE: NORMAL
VIT B1 BLD-MCNC: 48 NMOL/L (ref 70–180)

## 2018-01-16 PROCEDURE — 87591 N.GONORRHOEAE DNA AMP PROB: CPT

## 2018-01-16 PROCEDURE — 87491 CHLMYD TRACH DNA AMP PROBE: CPT

## 2018-01-16 RX ORDER — LANOLIN ALCOHOL/MO/W.PET/CERES
100 CREAM (GRAM) TOPICAL DAILY
Qty: 90 TAB | Refills: 3 | Status: ON HOLD | OUTPATIENT
Start: 2018-01-16 | End: 2019-01-18 | Stop reason: CLARIF

## 2018-01-16 NOTE — TELEPHONE ENCOUNTER
Called and discussed recent lab results. B1 low and will start Vitamin B1 100 mg daily replacement. Also discussed elevated liver function and encouraged to avoid alcohol use.

## 2018-01-23 ENCOUNTER — OFFICE VISIT (OUTPATIENT)
Dept: BEHAVIORAL HEALTH | Facility: PHYSICIAN GROUP | Age: 38
End: 2018-01-23
Payer: COMMERCIAL

## 2018-01-23 DIAGNOSIS — F41.9 ANXIETY DISORDER, UNSPECIFIED TYPE: ICD-10-CM

## 2018-01-23 DIAGNOSIS — F10.20 ALCOHOL USE DISORDER, MODERATE, DEPENDENCE (HCC): ICD-10-CM

## 2018-01-23 PROCEDURE — 90834 PSYTX W PT 45 MINUTES: CPT | Performed by: SOCIAL WORKER

## 2018-01-29 NOTE — BH THERAPY
" [facility]  Therapy Progress Note    Patient Name: Donna Morales  Patient MRN: [id number]  Today's Date: 1/29/2018     Type of session:Individual psychotherapy  Length of session: 45 minutes  Persons in attendance:Patient    Subjective/New Info: Pt states she has not been able to maintain sobriety since leaving [facility].  Recently saw Dr Dunbar and re-started meds; they help w/cravings and anxiety.  Continues to struggle w/unstructured time where she is not accountable to others.  Says she stayed abstinent while in program b/c she wanted to be honest w/others in program.  Has not attended AA nor gotten sponsor.  When asked how much/when last alcohol consumed is not specific, \"not as bad as it has been in the past.\"  Worried about the persistence of this problem, negative effects on her health, etc.  Explored idea of allowing some trusted people to know so that her addiction is less of a secret burden.    Objective/Observations:   Participation: Active verbal participation   Grooming: Casual and Neat   Cognition: Alert and Fully Oriented   Eye contact: Good   Mood: Anxious   Affect: Flexible and Congruent with content   Thought process: Logical and Goal-directed   Speech: Rate within normal limits   Other:     Diagnoses:   1. Alcohol use disorder, moderate, dependence (CMS-HCC)    2. Anxiety disorder, unspecified type         Current risk:   SUICIDE: Low   Homicide: Low   Self-harm: Low   Relapse: Moderate-for increasing use   Other:    Safety Plan reviewed? Not Indicated   If evidence of imminent risk is present, intervention/plan:     Therapeutic Intervention(s): Cognitive modification, Maladaptive behavior addressed, Supportive psychotherapy and Treatment compliance addressed    Treatment Goal(s)/Objective(s) addressed: Re-assessing pt's goal w/regard to alcohol, eg abstinence or moderate use; problem solving ways to increase support network     Progress toward Treatment Goals: No " change    Plan:  - Continue Individual therapy and Medication management    Enedina Hairston L.C.S.W.  1/29/2018

## 2018-03-19 ENCOUNTER — OFFICE VISIT (OUTPATIENT)
Dept: BEHAVIORAL HEALTH | Facility: PHYSICIAN GROUP | Age: 38
End: 2018-03-19
Payer: COMMERCIAL

## 2018-03-19 DIAGNOSIS — F41.9 ANXIETY DISORDER, UNSPECIFIED TYPE: ICD-10-CM

## 2018-03-19 DIAGNOSIS — F10.20 ALCOHOL USE DISORDER, MODERATE, DEPENDENCE (HCC): ICD-10-CM

## 2018-03-19 PROCEDURE — 90834 PSYTX W PT 45 MINUTES: CPT | Performed by: SOCIAL WORKER

## 2018-03-20 NOTE — BH THERAPY
[facility]  Therapy Progress Note    Patient Name: Donna Morales  Patient MRN: [id number]  Today's Date: 3/20/2018     Type of session:Individual psychotherapy  Length of session: 45 minutes  Persons in attendance:Patient    Subjective/New Info: Pt reports she is more anxious lately related to job stress.  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  Pt has TMJ which sometimes flares up when she is under more stress; today is more painful.  Continues to feel apprehensive re off time, ie, spring break is next 2 weeks.  Plans activities, time w/family etc.  If alone will binge drink, and experience w/drawal when she stops, vomiting, etc., and feel poorly for a few days subsequent days.  Has not made AA connections; effort to find sponsor fell through and has not found replacement.  Now dating a man she cares about.  He does not drink, but pt has not disclosed her diffficulty w/alcohol to him.  Worries she will pass on genetic tendency for addiction if she has children.    Objective/Observations:   Participation: Active verbal participation   Grooming: Neat   Cognition: Alert and Fully Oriented   Eye contact: Good   Mood: Anxious   Affect: Flexible and Congruent with content   Thought process: Logical and Goal-directed   Speech: Rate within normal limits and Volume within normal limits   Other:     Diagnoses:   1. Alcohol use disorder, moderate, dependence (CMS-HCC)    2. Anxiety disorder, unspecified type         Current risk:   SUICIDE: Low   Homicide: Low   Self-harm: Low   Relapse: Moderate-to increase use   Other:    Safety Plan reviewed? Not Indicated   If evidence of imminent risk is present, intervention/plan:     Therapeutic Intervention(s): Cognitive modification, Maladaptive behavior addressed, Positive behavior reinforced, Self-care skills and Supportive psychotherapy    Treatment Goal(s)/Objective(s) addressed: Establishing support in recovery to sustain a  pattern of abstinence     Progress toward Treatment Goals: Mild improvement    Plan:  - Continue Individual therapy and Medication management    Enedina Hairston L.C.S.W.  3/20/2018

## 2018-04-16 ENCOUNTER — APPOINTMENT (OUTPATIENT)
Dept: BEHAVIORAL HEALTH | Facility: PHYSICIAN GROUP | Age: 38
End: 2018-04-16

## 2018-05-29 ENCOUNTER — APPOINTMENT (OUTPATIENT)
Dept: BEHAVIORAL HEALTH | Facility: PHYSICIAN GROUP | Age: 38
End: 2018-05-29

## 2018-07-16 ENCOUNTER — TELEPHONE (OUTPATIENT)
Dept: BEHAVIORAL HEALTH | Facility: PHYSICIAN GROUP | Age: 38
End: 2018-07-16

## 2018-07-18 ENCOUNTER — OFFICE VISIT (OUTPATIENT)
Dept: BEHAVIORAL HEALTH | Facility: PHYSICIAN GROUP | Age: 38
End: 2018-07-18
Payer: COMMERCIAL

## 2018-07-18 DIAGNOSIS — F43.23 ADJUSTMENT DISORDER WITH MIXED ANXIETY AND DEPRESSED MOOD: ICD-10-CM

## 2018-07-18 DIAGNOSIS — F10.20 ALCOHOL USE DISORDER, MODERATE, DEPENDENCE (HCC): ICD-10-CM

## 2018-07-18 PROCEDURE — 90834 PSYTX W PT 45 MINUTES: CPT | Performed by: SOCIAL WORKER

## 2018-07-18 NOTE — BH THERAPY
" [facility]  Therapy Progress Note    Patient Name: Donna Morales  Patient MRN: [id number]  Today's Date: 2018     Type of session:Individual psychotherapy  Length of session: 45 minutes  Persons in attendance:Patient    Subjective/New Info: Pt seen for urgent appt.  Grandfather  unexpectedly one week ago. Grandfather was paternal presence in pt's life, more than her father.   Pt struggling to accept this loss, worried about caring for grandmother and mother.  Worries grandmother will decline.  Pt states she is \"doing better\" with regard to alcohol, but not abstaining.  She feels an obligation to be there and support family, so able to avoid binge drinking.   In a new relationship; has not disclosed her difficulty w/alcohol.    Talked w/pt about self-care, eg rest, nutrition, support.      Objective/Observations:   Participation: Active verbal participation   Grooming: Casual and Neat   Cognition: Fully Oriented   Eye contact: Good   Mood: sad   Affect: Congruent with content and Tearful   Thought process: Goal-directed   Speech: Rate within normal limits and Volume within normal limits   Other:     Diagnoses:   1. Adjustment disorder with mixed anxiety and depressed mood    2. Alcohol use disorder, moderate, dependence (HCC)         Current risk:   SUICIDE: Low   Homicide: Low   Self-harm: Low   Relapse: Moderate-for increased use   Other:    Safety Plan reviewed? No   If evidence of imminent risk is present, intervention/plan:     Therapeutic Intervention(s): Cognitive modification, supportive therapy    Treatment Goal(s)/Objective(s) addressed: New issue: processing grief re death of grandfather; revisited abstaining from alcohol     Progress toward Treatment Goals: appropriate grief response; mild decline re alcohol abuse    Plan:  - Continue Individual therapy and Medication management    Enedina Hairston L.C.S.W.  2018                                 "

## 2018-12-31 ENCOUNTER — HOSPITAL ENCOUNTER (EMERGENCY)
Facility: MEDICAL CENTER | Age: 38
End: 2019-01-01
Attending: EMERGENCY MEDICINE
Payer: COMMERCIAL

## 2018-12-31 DIAGNOSIS — E87.6 HYPOKALEMIA: ICD-10-CM

## 2018-12-31 DIAGNOSIS — R73.9 HYPERGLYCEMIA: ICD-10-CM

## 2018-12-31 DIAGNOSIS — N12 PYELONEPHRITIS: ICD-10-CM

## 2018-12-31 DIAGNOSIS — F10.10 ALCOHOL ABUSE: ICD-10-CM

## 2018-12-31 DIAGNOSIS — E83.51 HYPOCALCEMIA: ICD-10-CM

## 2018-12-31 PROCEDURE — 96365 THER/PROPH/DIAG IV INF INIT: CPT

## 2018-12-31 PROCEDURE — 96375 TX/PRO/DX INJ NEW DRUG ADDON: CPT

## 2018-12-31 PROCEDURE — 99285 EMERGENCY DEPT VISIT HI MDM: CPT

## 2018-12-31 NOTE — LETTER
1/4/2019               Donna Juanetelvina Morales  [address] [address]        Dear Donna (MR#[id number])    This letter is sent in regards to your, recent visit to the [facility] on 12/31/2018.  During the visit, tests were performed to assist the physician in a medical diagnosis.  A review of those tests requires that we notify you of the following:    Your urine culture was POSITIVE for a bacteria called Escherichia coli. The antibiotic prescribed for you (cefdinir) should be active to treat this bacteria. IT IS IMPORTANT THAT YOU CONTINUE TAKING YOUR ANTIBIOTIC UNTIL IT IS FINISHED.      Please feel free to contact me at the number below if you have any questions or concerns. Thank you for your cooperation in the matter.    Sincerely,  ED Culture Follow-Up Staff  Alisha Isaac, PharmD    [facility], Emergency Department  [address] [place] [id number]  [phone]

## 2019-01-01 ENCOUNTER — APPOINTMENT (OUTPATIENT)
Dept: RADIOLOGY | Facility: MEDICAL CENTER | Age: 39
End: 2019-01-01
Attending: EMERGENCY MEDICINE
Payer: COMMERCIAL

## 2019-01-01 VITALS
HEART RATE: 88 BPM | HEIGHT: 63 IN | WEIGHT: 122.36 LBS | DIASTOLIC BLOOD PRESSURE: 107 MMHG | RESPIRATION RATE: 18 BRPM | TEMPERATURE: 98.3 F | BODY MASS INDEX: 21.68 KG/M2 | OXYGEN SATURATION: 97 % | SYSTOLIC BLOOD PRESSURE: 146 MMHG

## 2019-01-01 LAB
ALBUMIN SERPL BCP-MCNC: 3.5 G/DL (ref 3.2–4.9)
ALBUMIN/GLOB SERPL: 0.9 G/DL
ALP SERPL-CCNC: 221 U/L (ref 30–99)
ALT SERPL-CCNC: 40 U/L (ref 2–50)
ANION GAP SERPL CALC-SCNC: 14 MMOL/L (ref 0–11.9)
APPEARANCE UR: ABNORMAL
AST SERPL-CCNC: 316 U/L (ref 12–45)
BACTERIA #/AREA URNS HPF: ABNORMAL /HPF
BASOPHILS # BLD AUTO: 3.6 % (ref 0–1.8)
BASOPHILS # BLD: 0.18 K/UL (ref 0–0.12)
BILIRUB SERPL-MCNC: 1.4 MG/DL (ref 0.1–1.5)
BILIRUB UR QL STRIP.AUTO: ABNORMAL
BUN SERPL-MCNC: 9 MG/DL (ref 8–22)
CALCIUM SERPL-MCNC: 6.8 MG/DL (ref 8.4–10.2)
CHLORIDE SERPL-SCNC: 101 MMOL/L (ref 96–112)
CO2 SERPL-SCNC: 24 MMOL/L (ref 20–33)
COLOR UR: YELLOW
CREAT SERPL-MCNC: 1.03 MG/DL (ref 0.5–1.4)
EOSINOPHIL # BLD AUTO: 0.06 K/UL (ref 0–0.51)
EOSINOPHIL NFR BLD: 1.2 % (ref 0–6.9)
EPI CELLS #/AREA URNS HPF: ABNORMAL /HPF
ERYTHROCYTE [DISTWIDTH] IN BLOOD BY AUTOMATED COUNT: 62.1 FL (ref 35.9–50)
GLOBULIN SER CALC-MCNC: 3.8 G/DL (ref 1.9–3.5)
GLUCOSE SERPL-MCNC: 164 MG/DL (ref 65–99)
GLUCOSE UR STRIP.AUTO-MCNC: NEGATIVE MG/DL
HCG UR QL: NEGATIVE
HCT VFR BLD AUTO: 34.9 % (ref 37–47)
HGB BLD-MCNC: 11.7 G/DL (ref 12–16)
IMM GRANULOCYTES # BLD AUTO: 0.05 K/UL (ref 0–0.11)
IMM GRANULOCYTES NFR BLD AUTO: 1 % (ref 0–0.9)
KETONES UR STRIP.AUTO-MCNC: ABNORMAL MG/DL
LEUKOCYTE ESTERASE UR QL STRIP.AUTO: ABNORMAL
LIPASE SERPL-CCNC: 14 U/L (ref 7–58)
LYMPHOCYTES # BLD AUTO: 2.43 K/UL (ref 1–4.8)
LYMPHOCYTES NFR BLD: 48.8 % (ref 22–41)
MCH RBC QN AUTO: 31.9 PG (ref 27–33)
MCHC RBC AUTO-ENTMCNC: 33.5 G/DL (ref 33.6–35)
MCV RBC AUTO: 95.1 FL (ref 81.4–97.8)
MICRO URNS: ABNORMAL
MONOCYTES # BLD AUTO: 0.34 K/UL (ref 0–0.85)
MONOCYTES NFR BLD AUTO: 6.8 % (ref 0–13.4)
MUCOUS THREADS #/AREA URNS HPF: ABNORMAL /HPF
NEUTROPHILS # BLD AUTO: 1.92 K/UL (ref 2–7.15)
NEUTROPHILS NFR BLD: 38.6 % (ref 44–72)
NITRITE UR QL STRIP.AUTO: NEGATIVE
NRBC # BLD AUTO: 0.05 K/UL
NRBC BLD-RTO: 1 /100 WBC
PH UR STRIP.AUTO: 5.5 [PH]
PLATELET # BLD AUTO: 88 K/UL (ref 164–446)
PMV BLD AUTO: 11.4 FL (ref 9–12.9)
POTASSIUM SERPL-SCNC: 3.2 MMOL/L (ref 3.6–5.5)
PROT SERPL-MCNC: 7.3 G/DL (ref 6–8.2)
PROT UR QL STRIP: >=300 MG/DL
RBC # BLD AUTO: 3.67 M/UL (ref 4.2–5.4)
RBC # URNS HPF: ABNORMAL /HPF
RBC UR QL AUTO: ABNORMAL
SODIUM SERPL-SCNC: 139 MMOL/L (ref 135–145)
SP GR UR REFRACTOMETRY: 1.02
SP GR UR REFRACTOMETRY: 1.02
UNIDENT CRYS URNS QL MICRO: ABNORMAL /HPF
WBC # BLD AUTO: 5 K/UL (ref 4.8–10.8)
WBC #/AREA URNS HPF: ABNORMAL /HPF

## 2019-01-01 PROCEDURE — 80053 COMPREHEN METABOLIC PANEL: CPT

## 2019-01-01 PROCEDURE — 96365 THER/PROPH/DIAG IV INF INIT: CPT

## 2019-01-01 PROCEDURE — 74176 CT ABD & PELVIS W/O CONTRAST: CPT

## 2019-01-01 PROCEDURE — 700102 HCHG RX REV CODE 250 W/ 637 OVERRIDE(OP): Performed by: EMERGENCY MEDICINE

## 2019-01-01 PROCEDURE — 96375 TX/PRO/DX INJ NEW DRUG ADDON: CPT

## 2019-01-01 PROCEDURE — 81025 URINE PREGNANCY TEST: CPT

## 2019-01-01 PROCEDURE — 700105 HCHG RX REV CODE 258: Performed by: EMERGENCY MEDICINE

## 2019-01-01 PROCEDURE — 87086 URINE CULTURE/COLONY COUNT: CPT

## 2019-01-01 PROCEDURE — A9270 NON-COVERED ITEM OR SERVICE: HCPCS | Performed by: EMERGENCY MEDICINE

## 2019-01-01 PROCEDURE — 87077 CULTURE AEROBIC IDENTIFY: CPT

## 2019-01-01 PROCEDURE — 87186 SC STD MICRODIL/AGAR DIL: CPT

## 2019-01-01 PROCEDURE — 85025 COMPLETE CBC W/AUTO DIFF WBC: CPT

## 2019-01-01 PROCEDURE — 83690 ASSAY OF LIPASE: CPT

## 2019-01-01 PROCEDURE — 36415 COLL VENOUS BLD VENIPUNCTURE: CPT

## 2019-01-01 PROCEDURE — 81001 URINALYSIS AUTO W/SCOPE: CPT

## 2019-01-01 PROCEDURE — 700111 HCHG RX REV CODE 636 W/ 250 OVERRIDE (IP): Performed by: EMERGENCY MEDICINE

## 2019-01-01 RX ORDER — MORPHINE SULFATE 15 MG/1
15 TABLET ORAL EVERY 6 HOURS PRN
Qty: 8 TAB | Refills: 0 | Status: SHIPPED | OUTPATIENT
Start: 2019-01-01 | End: 2019-01-04

## 2019-01-01 RX ORDER — ONDANSETRON 2 MG/ML
4 INJECTION INTRAMUSCULAR; INTRAVENOUS ONCE
Status: COMPLETED | OUTPATIENT
Start: 2019-01-01 | End: 2019-01-01

## 2019-01-01 RX ORDER — CEFDINIR 300 MG/1
300 CAPSULE ORAL 2 TIMES DAILY
Qty: 20 CAP | Refills: 0 | Status: ON HOLD | OUTPATIENT
Start: 2019-01-01 | End: 2019-01-18

## 2019-01-01 RX ORDER — KETOROLAC TROMETHAMINE 30 MG/ML
15 INJECTION, SOLUTION INTRAMUSCULAR; INTRAVENOUS ONCE
Status: COMPLETED | OUTPATIENT
Start: 2019-01-01 | End: 2019-01-01

## 2019-01-01 RX ORDER — DIAZEPAM 5 MG/1
10 TABLET ORAL ONCE
Status: COMPLETED | OUTPATIENT
Start: 2019-01-01 | End: 2019-01-01

## 2019-01-01 RX ORDER — OXYCODONE HYDROCHLORIDE 5 MG/1
5 TABLET ORAL ONCE
Status: COMPLETED | OUTPATIENT
Start: 2019-01-01 | End: 2019-01-01

## 2019-01-01 RX ORDER — CALCIUM CARBONATE 500 MG/1
500 TABLET, CHEWABLE ORAL DAILY
Qty: 30 TAB | Refills: 0 | Status: ON HOLD | OUTPATIENT
Start: 2019-01-01 | End: 2019-01-18

## 2019-01-01 RX ORDER — DIAZEPAM 5 MG/1
20 TABLET ORAL ONCE
Status: COMPLETED | OUTPATIENT
Start: 2019-01-01 | End: 2019-01-01

## 2019-01-01 RX ORDER — CALCIUM CARBONATE 500 MG/1
500 TABLET, CHEWABLE ORAL ONCE
Status: COMPLETED | OUTPATIENT
Start: 2019-01-01 | End: 2019-01-01

## 2019-01-01 RX ADMIN — ONDANSETRON 4 MG: 2 INJECTION INTRAMUSCULAR; INTRAVENOUS at 01:27

## 2019-01-01 RX ADMIN — CEFTRIAXONE 1 G: 1 INJECTION, POWDER, FOR SOLUTION INTRAMUSCULAR; INTRAVENOUS at 04:52

## 2019-01-01 RX ADMIN — DIAZEPAM 20 MG: 5 TABLET ORAL at 03:05

## 2019-01-01 RX ADMIN — KETOROLAC TROMETHAMINE 15 MG: 30 INJECTION, SOLUTION INTRAMUSCULAR at 01:26

## 2019-01-01 RX ADMIN — OXYCODONE HYDROCHLORIDE 5 MG: 5 TABLET ORAL at 04:55

## 2019-01-01 RX ADMIN — CALCIUM CARBONATE (ANTACID) CHEW TAB 500 MG 500 MG: 500 CHEW TAB at 02:59

## 2019-01-01 RX ADMIN — DIAZEPAM 10 MG: 5 TABLET ORAL at 01:26

## 2019-01-01 ASSESSMENT — LIFESTYLE VARIABLES
ORIENTATION AND CLOUDING OF SENSORIUM: ORIENTED AND CAN DO SERIAL ADDITIONS
AUDITORY DISTURBANCES: NOT PRESENT
TREMOR: MODERATE TREMOR WITH ARMS EXTENDED
AGITATION: NORMAL ACTIVITY
TOTAL SCORE: 6
VISUAL DISTURBANCES: NOT PRESENT
NAUSEA AND VOMITING: MILD NAUSEA WITH NO VOMITING
HEADACHE, FULLNESS IN HEAD: NOT PRESENT
EVER HAD A DRINK FIRST THING IN THE MORNING TO STEADY YOUR NERVES TO GET RID OF A HANGOVER: YES
ORIENTATION AND CLOUDING OF SENSORIUM: ORIENTED AND CAN DO SERIAL ADDITIONS
HAVE PEOPLE ANNOYED YOU BY CRITICIZING YOUR DRINKING: YES
VISUAL DISTURBANCES: NOT PRESENT
DOES PATIENT WANT TO STOP DRINKING: YES
ANXIETY: MILDLY ANXIOUS
NAUSEA AND VOMITING: NO NAUSEA AND NO VOMITING
PAROXYSMAL SWEATS: NO SWEAT VISIBLE
AUDITORY DISTURBANCES: NOT PRESENT
TREMOR: TREMOR NOT VISIBLE BUT CAN BE FELT, FINGERTIP TO FINGERTIP
TOTAL SCORE: 4
TOTAL SCORE: 4
ANXIETY: MILDLY ANXIOUS
HEADACHE, FULLNESS IN HEAD: NOT PRESENT
HAVE YOU EVER FELT YOU SHOULD CUT DOWN ON YOUR DRINKING: YES
EVER FELT BAD OR GUILTY ABOUT YOUR DRINKING: YES
AGITATION: NORMAL ACTIVITY
TOTAL SCORE: 4
CONSUMPTION TOTAL: INCOMPLETE
TOTAL SCORE: 2
DO YOU DRINK ALCOHOL: YES
PAROXYSMAL SWEATS: NO SWEAT VISIBLE

## 2019-01-01 ASSESSMENT — PAIN DESCRIPTION - DESCRIPTORS: DESCRIPTORS: STABBING;SHARP

## 2019-01-01 ASSESSMENT — PAIN SCALES - GENERAL: PAINLEVEL_OUTOF10: 8

## 2019-01-01 NOTE — ED TRIAGE NOTES
"Chief Complaint   Patient presents with   • Flank Pain     right side X \"weeks\"   • Detox     last drink at 10 am 12/31   • Medical Clearance     [facility] and was told she needed medical clearance   • Blood in Urine     approx 3 days     /96   Pulse 81   Temp 36.8 °C (98.3 °F) (Temporal)   Resp 16   Ht 1.6 m (5' 3\")   Wt 55.5 kg (122 lb 5.7 oz)   SpO2 100%   BMI 21.67 kg/m²     Pt attempted to check in to [facility] for alcohol treatment. Pt states she told them about the blood in urine and side pain and was told she needed medical clearance.  "

## 2019-01-01 NOTE — ED NOTES
IV removed, discharge instructions and 1 paper rx given. Educated on when to return to ed and follow up with PCP and [facility]. Pt verbalized understanding. Pt states boyfriend to drive pt home.

## 2019-01-01 NOTE — DISCHARGE INSTRUCTIONS
You were seen in the emergency department for flank pain and medical clearance with blood in the urine.  You are found to have a kidney infection.  You were started on IV antibiotics for this and are now being started on a prescription for oral antibiotics.  You had several lab abnormalities, including high blood glucose, low potassium, low calcium.    You were found to have a low potassium level.  At this time, it does not appear dangerous, however you should eat fruits and vegetables that are high in potassium.  This includes bananas, oranges, avocados, cooked beans, baked potatoes among others.  You can do an Internet search to find foods that are high in potassium.    Your kidney pain should begin to improve after the antibiotic to take effect.  In the meantime, you can take ibuprofen for pain.    You are being sent home with an opioid pain medication. This medication causes sedation and you should not drive or operate machinery while taking it. You should not use alcohol or recreational drugs while taking this medication. Side effects of this medication can include itching, nausea, and constipation.    There is a risk of addiction to this medication and you should only take the smallest amount necessary to control your symptoms. You should use other non-opioid pain medications for your baseline pain and only use this medication if necessary.     There are many alternatives to pain medications, such as massage, acupuncture, and meditation. Check with your health insurance regarding their coverage of these complementary treatments.      Please be alert for signs of alcohol withdrawal, including shaking hands, agitation, feeling pins and needles, nausea, vomiting, headache. Please seek medical care if you develop these. Alcohol withdrawal can be dangerous and even lead to seizures or death if untreated.    Please return to the emergency department seek medical attention if you develop:  Fever, vomiting, abdominal  pain, new or worsening symptoms.

## 2019-01-01 NOTE — ED NOTES
Norma from Lab called with critical result of Ca 6.8 at 0246. Critical lab result read back to Norma.   Dr. Rodriguez notified of critical lab result at 0246.  Critical lab result read back by Dr. Rodriguez.

## 2019-01-01 NOTE — ED PROVIDER NOTES
"ED Provider Note      Means of Arrival: Private vehicle  History obtained from: Patient      CHIEF COMPLAINT  Chief Complaint   Patient presents with   • Flank Pain     right side X \"weeks\"   • Detox     last drink at 10 am 12/31   • Medical Clearance     wanted to check in to [facility] for alcohol detox and was told she needed medical clearance   • Blood in Urine     approx 3 days       HPI  Donna Morales is a 38 y.o. female who presents for medical clearance.  The patient is requesting inpatient alcohol detox, went to [facility], however she was reporting that she had right-sided flank pain and blood in her urine, and the center for evaluation.  She reports that she has had pain in her right flank that is been ongoing for the past several weeks.  There are no aggravating or alleviating factors.  It does radiate around her right side towards her right upper abdomen.  She denies any difference with eating or drinking.  She also reports approximately 3 days ago that she believe that she has blood in the urine.  She endorses ongoing nausea and occasional vomiting    REVIEW OF SYSTEMS  CONSTITUTIONAL:  No fever.  CARDIOVASCULAR:  No chest discomfort.  RESPIRATORY:  No pleuritic chest pain.  GASTROINTESTINAL:  No abdominal pain.  GENITOURINARY:   No dysuria.  MUSCULOSKELETAL:  No arthralgia.  SKIN:  No rash or suspicious lesions.  NEUROLOGIC:   No headache.  ENDOCRINE:  No facial edema.  HEMATOLOGIC:  No abnormal bleeding.       See HPI for further details.   All other systems are negative.     PAST MEDICAL HISTORY  Past Medical History:   Diagnosis Date   • Alcoholism (HCC)    • Anemia    • Anesthesia     \"woke up before I should have\"   • Anxiety    • Dialysis 2008   • Heart burn    • Hypertension    • Kidney failure 2008    dialysis, resolved \"too much tylenol\"   • Liver failure (HCC)    • Other specified symptom associated with female genital organs     endometriosis   • Pancreatitis    • " "Wrist pain        FAMILY HISTORY  Family History   Problem Relation Age of Onset   • Alcohol abuse Father    • Diabetes Unknown    • Stroke Unknown    • Hypertension Unknown    • Alcohol abuse Paternal Grandfather    • Psychiatry Paternal Grandmother         agoraphobia   • Alcohol abuse Paternal Grandmother        SOCIAL HISTORY   reports that she has never smoked. She has never used smokeless tobacco. She reports that she drinks about 36.0 oz of alcohol per week . She reports that she does not use drugs.    SURGICAL HISTORY  Past Surgical History:   Procedure Laterality Date   • FLEXOR TENDON REPAIR  7/18/2012    Performed by MAI BRITO at SURGERY [facility] ORS   • AMANDA BY LAPAROSCOPY  2006   • LAPAROSCOPY  2000    endometriosis       CURRENT MEDICATIONS  Home Medications     Reviewed by Irene Peterson R.N. (Registered Nurse) on 01/01/19 at 0010  Med List Status: Partial   Medication Last Dose Status   Cyanocobalamin (B-12) 1000 MCG Cap  Active   fluoxetine (PROZAC) 20 MG Cap  Active   lisinopril-hydrochlorothiazide (PRINZIDE, ZESTORETIC) 20-25 MG per tablet  Active   LO LOESTRIN FE 1 MG-10 MCG / 10 MCG Tab  Active   naltrexone (DEPADE) 50 MG Tab  Active   thiamine (THIAMINE) 100 MG tablet  Active   topiramate (TOPAMAX) 100 MG Tab  Active   trazodone (DESYREL) 100 MG Tab  Active                ALLERGIES  Allergies   Allergen Reactions   • Nkda [No Known Drug Allergy]        PHYSICAL EXAM  VITAL SIGNS: /96   Pulse 92   Temp 36.8 °C (98.3 °F) (Temporal)   Resp 16   Ht 1.6 m (5' 3\")   Wt 55.5 kg (122 lb 5.7 oz)   SpO2 99%   BMI 21.67 kg/m²    Gen: Alert  HENT: ATNC  Eyes: Normal conjunctiva  Neck: trachea midline  Resp: no respiratory distress  CV: No JVD  Abd: non-distended  Ext: No deformities  Psych: normal mood  Neuro: speech fluent       RADIOLOGY/PROCEDURES  CT-RENAL COLIC EVALUATION(A/P W/O)   Final Result      1.  Scattered foci of air in the right kidney. Findings may be related to " pyelonephritis. No definite abscess is identified on noncontrast imaging. Right renal atrophy.      2.  Hepatic steatosis and hepatomegaly.      3.  Scattered calcifications in the pancreas are consistent with chronic pancreatitis.      4.  Leiomyomatous uterus.          LABS  Results for orders placed or performed during the hospital encounter of 12/31/18   CBC WITH DIFFERENTIAL   Result Value Ref Range    WBC 5.0 4.8 - 10.8 K/uL    RBC 3.67 (L) 4.20 - 5.40 M/uL    Hemoglobin 11.7 (L) 12.0 - 16.0 g/dL    Hematocrit 34.9 (L) 37.0 - 47.0 %    MCV 95.1 81.4 - 97.8 fL    MCH 31.9 27.0 - 33.0 pg    MCHC 33.5 (L) 33.6 - 35.0 g/dL    RDW 62.1 (H) 35.9 - 50.0 fL    Platelet Count 88 (L) 164 - 446 K/uL    MPV 11.4 9.0 - 12.9 fL    Neutrophils-Polys 38.60 (L) 44.00 - 72.00 %    Lymphocytes 48.80 (H) 22.00 - 41.00 %    Monocytes 6.80 0.00 - 13.40 %    Eosinophils 1.20 0.00 - 6.90 %    Basophils 3.60 (H) 0.00 - 1.80 %    Immature Granulocytes 1.00 (H) 0.00 - 0.90 %    Nucleated RBC 1.00 /100 WBC    Neutrophils (Absolute) 1.92 (L) 2.00 - 7.15 K/uL    Lymphs (Absolute) 2.43 1.00 - 4.80 K/uL    Monos (Absolute) 0.34 0.00 - 0.85 K/uL    Eos (Absolute) 0.06 0.00 - 0.51 K/uL    Baso (Absolute) 0.18 (H) 0.00 - 0.12 K/uL    Immature Granulocytes (abs) 0.05 0.00 - 0.11 K/uL    NRBC (Absolute) 0.05 K/uL   COMP METABOLIC PANEL   Result Value Ref Range    Sodium 139 135 - 145 mmol/L    Potassium 3.2 (L) 3.6 - 5.5 mmol/L    Chloride 101 96 - 112 mmol/L    Co2 24 20 - 33 mmol/L    Anion Gap 14.0 (H) 0.0 - 11.9    Glucose 164 (H) 65 - 99 mg/dL    Bun 9 8 - 22 mg/dL    Creatinine 1.03 0.50 - 1.40 mg/dL    Calcium 6.8 (LL) 8.4 - 10.2 mg/dL    AST(SGOT) 316 (H) 12 - 45 U/L    ALT(SGPT) 40 2 - 50 U/L    Alkaline Phosphatase 221 (H) 30 - 99 U/L    Total Bilirubin 1.4 0.1 - 1.5 mg/dL    Albumin 3.5 3.2 - 4.9 g/dL    Total Protein 7.3 6.0 - 8.2 g/dL    Globulin 3.8 (H) 1.9 - 3.5 g/dL    A-G Ratio 0.9 g/dL   LIPASE   Result Value Ref Range    Lipase  14 7 - 58 U/L   URINALYSIS CULTURE, IF INDICATED   Result Value Ref Range    Micro Urine Req Microscopic     Color Yellow     Character Hazy (A)     Ph 5.5 5.0 - 8.0    Glucose Negative Negative mg/dL    Ketones Trace (A) Negative mg/dL    Protein >=300 (A) Negative mg/dL    Bilirubin Moderate (A) Negative    Nitrite Negative Negative    Leukocyte Esterase Small (A) Negative    Occult Blood Small (A) Negative   HCG QUALITATIVE UR (Lab)   Result Value Ref Range    Beta-Hcg Urine Negative Negative   REFRACTOMETER SG   Result Value Ref Range    Specific Gravity 1.025    REFRACTOMETER SG   Result Value Ref Range    Specific Gravity 1.025    URINE MICROSCOPIC (W/UA)   Result Value Ref Range    WBC 20-50 (A) /hpf    RBC 2-5 (A) /hpf    Bacteria Moderate (A) None /hpf    Epithelial Cells Few Few /hpf    Mucous Threads Few /hpf    Urine Crystals Few Amorphous /hpf   ESTIMATED GFR   Result Value Ref Range    GFR If African American >60 >60 mL/min/1.73 m 2    GFR If Non African American 60 >60 mL/min/1.73 m 2         COURSE & MEDICAL DECISION MAKING  Pertinent Labs & Imaging studies reviewed. (See chart for details)    Patient presents for medical clearance with hematuria and right flank pain.  Will obtain urinalysis, LFTs, basic labs.  Patient does have some tremor, will treat with p.o. Valium.  Patient likely has early uncomplicated withdrawal at this time.    Patient's urinalysis concerning for possible urinary tract infection, however on reexamination, patient denies any urinary symptoms.  This is now concerning for intra-abdominal infection or other etiology, particularly with her prolonged flank pain, there could be abscess or infected stone.  Will obtain CTU to evaluate for abscess or other abnormalities.    Patient was found to have hypocalcemia, hypokalemia.  She will be given calcium supplementation in the form of Tums in the emergency department.  She will be advised to eat plenty of fruits and vegetables.       Patient CT is concerning for pyelonephritis.  Patient does not appear clinically toxic, she is afebrile, with reassuring vital signs.  Patient will be given IV antibiotics, then prescribed outpatient antibiotics for this.  Patient has persistent pain despite non-opioid pain medications.  She will be provided with opiate pain medications in the emergency department.    Patient feels improved after opioid pain medications.  As she has liver abnormalities, acetaminophen is relatively contraindicated.  Will provide a short course of opioid pain medications for breakthrough pain.  A prescription database morning query was performed, which did not demonstrate a high risk pattern of prescriptions.  I believe the patient is moderate risk for opioid abuse given her alcoholism, however given her acute pain, I believe that non-opioid pain medications will not be sufficient for controlling her pain.  The risks including constipation and addiction were discussed with the patient.    Patient has LFT abnormalities consistent with alcoholic liver injury.  Patient has had a cholecystectomy.    FINAL IMPRESSION  1. Pyelonephritis    2. Hypokalemia    3. Alcohol abuse    4. Hypocalcemia    5. Hyperglycemia

## 2019-01-03 LAB
BACTERIA UR CULT: ABNORMAL
BACTERIA UR CULT: ABNORMAL
SIGNIFICANT IND 70042: ABNORMAL
SITE SITE: ABNORMAL
SOURCE SOURCE: ABNORMAL

## 2019-01-04 NOTE — ED NOTES
ED Positive Culture Follow-up/Notification Note:    Date: 1/4/19     Patient seen in the ED on 12/31/2018 for right sided flank pain and medical clearance for admission to [facility] for detox.   1. Pyelonephritis    2. Hypokalemia    3. Alcohol abuse    4. Hypocalcemia    5. Hyperglycemia       Patient received ceftriaxone 1g iv once before discharge.   Discharge Medication List as of 1/1/2019  6:52 AM      START taking these medications    Details   cefdinir (OMNICEF) 300 MG Cap Take 1 Cap by mouth 2 times a day., Disp-20 Cap, R-0, Normal      calcium carbonate (TUMS) 500 MG Chew Tab Take 1 Tab by mouth every day., Disp-30 Tab, R-0, Normal      morphine (MS IR) 15 MG tablet Take 1 Tab by mouth every 6 hours as needed for Severe Pain for up to 3 days., Disp-8 Tab, R-0, Print Rx Paper, For 3 days             Allergies: Nkda [no known drug allergy]     Vitals:    01/01/19 0406 01/01/19 0522 01/01/19 0627 01/01/19 0629   BP:    146/107   Pulse: 92 (!) 108 88 88   Resp:    18   Temp:    36.8 °C (98.3 °F)   TempSrc:    Temporal   SpO2: 99% 98% 97% 97%   Weight:       Height:           Final cultures:   Results     Procedure Component Value Units Date/Time    URINE CULTURE(NEW) [[id number]]  (Abnormal)  (Susceptibility) Collected:  01/01/19 0130    Order Status:  Completed Specimen:  Urine Updated:  01/03/19 0930     Significant Indicator POS (POS)     Source UR     Site -     Urine Culture - (A)      Escherichia coli  >100,000 cfu/mL   (A)    Culture & Susceptibility     ESCHERICHIA COLI     Antibiotic Sensitivity Microscan Unit Status    Ampicillin Sensitive <=8 mcg/mL Final    Method: SENSITIVITY, YAZMIN    Cefepime Sensitive <=8 mcg/mL Final    Method: SENSITIVITY, YAZMIN    Cefotaxime Sensitive <=2 mcg/mL Final    Method: SENSITIVITY, YAZMIN    Cefotetan Sensitive <=16 mcg/mL Final    Method: SENSITIVITY, YAZMIN    Ceftazidime Sensitive <=1 mcg/mL Final    Method: SENSITIVITY, YAZMIN    Ceftriaxone Sensitive <=8 mcg/mL Final     Method: SENSITIVITY, YAZMIN    Cefuroxime Sensitive <=4 mcg/mL Final    Method: SENSITIVITY, YAZMIN    Cephalothin Sensitive <=8 mcg/mL Final    Method: SENSITIVITY, YAZMIN    Ciprofloxacin Sensitive <=1 mcg/mL Final    Method: SENSITIVITY, YAZMIN    Gentamicin Sensitive <=4 mcg/mL Final    Method: SENSITIVITY, YAZMIN    Levofloxacin Sensitive <=2 mcg/mL Final    Method: SENSITIVITY, YAZMIN    Nitrofurantoin Sensitive <=32 mcg/mL Final    Method: SENSITIVITY, YAZMIN    Pip/Tazobactam Sensitive <=16 mcg/mL Final    Method: SENSITIVITY, YAZMIN    Piperacillin Sensitive <=16 mcg/mL Final    Method: SENSITIVITY, YAZMIN    Tigecycline Sensitive <=2 mcg/mL Final    Method: SENSITIVITY, YAZMIN    Tobramycin Sensitive <=4 mcg/mL Final    Method: SENSITIVITY, YAZMIN    Trimeth/Sulfa Sensitive <=2/38 mcg/mL Final    Method: SENSITIVITY, YAZMIN                       URINALYSIS CULTURE, IF INDICATED [[id number]]  (Abnormal) Collected:  01/01/19 0130    Order Status:  Completed Specimen:  Urine Updated:  01/01/19 0158     Micro Urine Req Microscopic     Color Yellow     Character Hazy (A)     Ph 5.5     Glucose Negative mg/dL      Ketones Trace (A) mg/dL      Protein >=300 (A) mg/dL      Bilirubin Moderate (A)     Nitrite Negative     Leukocyte Esterase Small (A)     Occult Blood Small (A)          Plan:   Appropriate antibiotic therapy prescribed. No changes required based upon culture result.  Sent letter to patient to notify of positive culture result and encourage compliance with prescribed antibiotics.     Alisha Isaac, PharmD

## 2019-01-07 ENCOUNTER — DOCUMENTATION (OUTPATIENT)
Dept: BEHAVIORAL HEALTH | Facility: CLINIC | Age: 39
End: 2019-01-07

## 2019-01-11 ENCOUNTER — HOSPITAL ENCOUNTER (OUTPATIENT)
Dept: BEHAVIORAL HEALTH | Facility: MEDICAL CENTER | Age: 39
End: 2019-01-11
Attending: PSYCHIATRY & NEUROLOGY
Payer: COMMERCIAL

## 2019-01-11 DIAGNOSIS — F41.1 GENERALIZED ANXIETY DISORDER: ICD-10-CM

## 2019-01-11 DIAGNOSIS — F10.20 ACUTE ALCOHOLISM (HCC): ICD-10-CM

## 2019-01-11 PROCEDURE — 90791 PSYCH DIAGNOSTIC EVALUATION: CPT | Performed by: MARRIAGE & FAMILY THERAPIST
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
## 2019-01-12 NOTE — PROGRESS NOTES
I have reviewed the note by GOYO Zhou and agree with the assessment and treatment plan.    1. Admit to Intensive Outpatient Program on 1/14/19    - Group therapy per schedule,    - Psychoeducational groups per schedule,   - Individual/family counseling sessions with  per treatment plan.  2. Symptoms necessitating Intensive Outpatient Treatment: excessive alcohol use, anxiety  3. Medical screening/Physical exam per primary care provider or referring facility.

## 2019-01-14 ENCOUNTER — HOSPITAL ENCOUNTER (OUTPATIENT)
Dept: BEHAVIORAL HEALTH | Facility: MEDICAL CENTER | Age: 39
End: 2019-01-14
Attending: PSYCHIATRY & NEUROLOGY
Payer: COMMERCIAL

## 2019-01-14 DIAGNOSIS — F41.9 ANXIETY DISORDER, UNSPECIFIED TYPE: ICD-10-CM

## 2019-01-14 DIAGNOSIS — K70.10 ACUTE ALCOHOLIC HEPATITIS: ICD-10-CM

## 2019-01-14 DIAGNOSIS — F10.20 ALCOHOL USE DISORDER, MODERATE, DEPENDENCE (HCC): ICD-10-CM

## 2019-01-14 PROCEDURE — 90853 GROUP PSYCHOTHERAPY: CPT

## 2019-01-15 ENCOUNTER — HOSPITAL ENCOUNTER (OUTPATIENT)
Dept: BEHAVIORAL HEALTH | Facility: MEDICAL CENTER | Age: 39
End: 2019-01-15
Attending: PSYCHIATRY & NEUROLOGY
Payer: COMMERCIAL

## 2019-01-15 PROCEDURE — 90834 PSYTX W PT 45 MINUTES: CPT

## 2019-01-15 NOTE — BH THERAPY
Group Therapy Checklist  Attendance: (P) Attended  Number of Participants: (P) 3  Session #: (P) 1  Program/Group: (P) Intensive Outpatient Program  Topics Covered: (P) Relapse Prevention  Participation: (P) Active verbal participation  Affect/Mood Range: (P) Normal range  Affect/Mood Display: (P) Congruent w/content  Cognition: (P) Alert, Oriented  Evidence of Imminent Suicide Risk: (P) No  Evidence of imminent homicide risk: (P) No  Therapeutic Interventions: (P) Emotion clarification, Cognitive clarification  Progress Toward Treatment Goal: (P) Moderate improvement

## 2019-01-15 NOTE — BH THERAPY
Group Therapy Checklist  Attendance: (P) Attended  Number of Participants: (P) 3  Session #: (P) 1  Program/Group: (P) Intensive Outpatient Program  Topics Covered: (P) Other (Comment): (process group)  Participation: (P) Active verbal participation, Attentive  Shared her grief and loss issues over the loss of her grandfather in July 2018. States she just can't stand watching her grandmother suffer.  States she has been drinking too much and knows she needs to stop. Reports her last drink yesterday 1/13/19.  States she realizes she needs to go to meetings. Receptive to support from peers.  Affect/Mood Range: (P) Normal range, Labile  Affect/Mood Display: (P) Congruent w/content  Cognition: (P) Alert, Oriented  Evidence of Imminent Suicide Risk: (P) No  Evidence of imminent homicide risk: (P) No  Therapeutic Interventions: (P) Emotion clarification, Cognitive clarification  Progress Toward Treatment Goal: (P) Moderate improvement

## 2019-01-16 NOTE — CARE PLAN
"Problem: Substance Induced Symptoms  Goal: Stable mood and functioning    Intervention: Individual Counseling Sessions   [facility] Therapy Progress Note    Patient Name: Donna Morales  Patient MRN: [id number]  Today's Date: 1/15/2019     Type of session:Individual psychotherapy  Length of session: 50 minutes  Persons in attendance:Patient    Subjective/New Info: First individual session to discuss treatment plan and plan of care.  States she has not been eating or sleeping.  Discussed the importance of eating and sleeping.  Denies drinking.  Breathalyzer done to show 0.087.  When asked again if she had been drinking she was very tearful but adamantly denies drinking: \"I could not have drank because I was planning on going to school.  Discussed options of going back to [facility] to detox again and get some more treatment.  State she is unable to do that because she will loose her job.  Agreed to not drink and come back on Thursday to do the program.  Sent home at this time her Breathalyzer showed 0.070.    Objective/Observations:   Participation: Active verbal participation and Attentive   Grooming: Casual and Neat   Cognition: Drowsy/Somnolent   Eye contact: Limited   Mood: Depressed and Anxious   Affect: Flexible   Thought process: Logical   Speech: Rate within normal limits and Soft   Other:     Diagnoses: No diagnosis found.     Current risk:   SUICIDE: Not applicable   Homicide: Not applicable   Self-harm: Not applicable   Relapse: High   Other:    Safety Plan reviewed? Not Indicated   If evidence of imminent risk is present, intervention/plan:     Therapeutic Intervention(s): Distress tolerance skills, Interpersonal effectiveness skills and Maladaptive behavior addressed    Treatment Goal(s)/Objective(s) addressed: Despite a positive breathalyzer Donna continued to insist that she did not drink today. States alcohol just stays in her system longer. States she is " unable to go inpatient as she will loose her job.  Informed her that she can come back in two days to get back into the program if she is able to show up without alcohol in her system.       Progress toward Treatment Goals: Active relapse    Plan:  - Continue Individual therapy, Group therapy and 12-Step participation  Will work with Donna in the program if she is able to stay sober.  Have recommended inpatient treatment if she is unable to stay sober.    Jacklyn Avendano R.N.  1/15/2019

## 2019-01-17 ENCOUNTER — APPOINTMENT (OUTPATIENT)
Dept: RADIOLOGY | Facility: MEDICAL CENTER | Age: 39
DRG: 894 | End: 2019-01-17
Attending: EMERGENCY MEDICINE
Payer: COMMERCIAL

## 2019-01-17 ENCOUNTER — HOSPITAL ENCOUNTER (INPATIENT)
Facility: MEDICAL CENTER | Age: 39
LOS: 2 days | DRG: 894 | End: 2019-01-19
Attending: EMERGENCY MEDICINE | Admitting: HOSPITALIST
Payer: COMMERCIAL

## 2019-01-17 ENCOUNTER — HOSPITAL ENCOUNTER (OUTPATIENT)
Dept: BEHAVIORAL HEALTH | Facility: MEDICAL CENTER | Age: 39
End: 2019-01-17
Attending: PSYCHIATRY & NEUROLOGY
Payer: COMMERCIAL

## 2019-01-17 DIAGNOSIS — F10.930 ALCOHOL WITHDRAWAL SEIZURE WITHOUT COMPLICATION (HCC): ICD-10-CM

## 2019-01-17 DIAGNOSIS — R56.9 ALCOHOL WITHDRAWAL SEIZURE WITHOUT COMPLICATION (HCC): ICD-10-CM

## 2019-01-17 LAB
BASOPHILS # BLD AUTO: 1.1 % (ref 0–1.8)
BASOPHILS # BLD: 0.06 K/UL (ref 0–0.12)
EOSINOPHIL # BLD AUTO: 0.02 K/UL (ref 0–0.51)
EOSINOPHIL NFR BLD: 0.4 % (ref 0–6.9)
ERYTHROCYTE [DISTWIDTH] IN BLOOD BY AUTOMATED COUNT: 49.9 FL (ref 35.9–50)
HCG SERPL QL: NEGATIVE
HCT VFR BLD AUTO: 26.9 % (ref 37–47)
HGB BLD-MCNC: 9.1 G/DL (ref 12–16)
IMM GRANULOCYTES # BLD AUTO: 0.02 K/UL (ref 0–0.11)
IMM GRANULOCYTES NFR BLD AUTO: 0.4 % (ref 0–0.9)
LYMPHOCYTES # BLD AUTO: 1.43 K/UL (ref 1–4.8)
LYMPHOCYTES NFR BLD: 26 % (ref 22–41)
MCH RBC QN AUTO: 32.7 PG (ref 27–33)
MCHC RBC AUTO-ENTMCNC: 33.8 G/DL (ref 33.6–35)
MCV RBC AUTO: 96.8 FL (ref 81.4–97.8)
MONOCYTES # BLD AUTO: 0.32 K/UL (ref 0–0.85)
MONOCYTES NFR BLD AUTO: 5.8 % (ref 0–13.4)
NEUTROPHILS # BLD AUTO: 3.66 K/UL (ref 2–7.15)
NEUTROPHILS NFR BLD: 66.3 % (ref 44–72)
NRBC # BLD AUTO: 0 K/UL
NRBC BLD-RTO: 0 /100 WBC
PLATELET # BLD AUTO: 148 K/UL (ref 164–446)
PMV BLD AUTO: 11.3 FL (ref 9–12.9)
RBC # BLD AUTO: 2.78 M/UL (ref 4.2–5.4)
WBC # BLD AUTO: 5.5 K/UL (ref 4.8–10.8)

## 2019-01-17 PROCEDURE — 99223 1ST HOSP IP/OBS HIGH 75: CPT | Performed by: HOSPITALIST

## 2019-01-17 PROCEDURE — 96374 THER/PROPH/DIAG INJ IV PUSH: CPT | Mod: EDC

## 2019-01-17 PROCEDURE — 70450 CT HEAD/BRAIN W/O DYE: CPT

## 2019-01-17 PROCEDURE — 700111 HCHG RX REV CODE 636 W/ 250 OVERRIDE (IP): Mod: EDC | Performed by: EMERGENCY MEDICINE

## 2019-01-17 PROCEDURE — 700105 HCHG RX REV CODE 258: Mod: EDC | Performed by: EMERGENCY MEDICINE

## 2019-01-17 PROCEDURE — 99285 EMERGENCY DEPT VISIT HI MDM: CPT | Mod: EDC

## 2019-01-17 PROCEDURE — 770020 HCHG ROOM/CARE - TELE (206): Mod: EDC

## 2019-01-17 PROCEDURE — 84703 CHORIONIC GONADOTROPIN ASSAY: CPT | Mod: EDC

## 2019-01-17 PROCEDURE — 80053 COMPREHEN METABOLIC PANEL: CPT | Mod: EDC

## 2019-01-17 PROCEDURE — 85025 COMPLETE CBC W/AUTO DIFF WBC: CPT | Mod: EDC

## 2019-01-17 PROCEDURE — 90853 GROUP PSYCHOTHERAPY: CPT

## 2019-01-17 PROCEDURE — 80307 DRUG TEST PRSMV CHEM ANLYZR: CPT | Mod: EDC

## 2019-01-17 RX ORDER — LORAZEPAM 1 MG/1
0.5 TABLET ORAL EVERY 4 HOURS PRN
Status: DISCONTINUED | OUTPATIENT
Start: 2019-01-17 | End: 2019-01-19 | Stop reason: HOSPADM

## 2019-01-17 RX ORDER — PROMETHAZINE HYDROCHLORIDE 25 MG/1
12.5-25 TABLET ORAL EVERY 4 HOURS PRN
Status: DISCONTINUED | OUTPATIENT
Start: 2019-01-17 | End: 2019-01-19 | Stop reason: HOSPADM

## 2019-01-17 RX ORDER — PROMETHAZINE HYDROCHLORIDE 12.5 MG/1
12.5-25 SUPPOSITORY RECTAL EVERY 4 HOURS PRN
Status: DISCONTINUED | OUTPATIENT
Start: 2019-01-17 | End: 2019-01-19 | Stop reason: HOSPADM

## 2019-01-17 RX ORDER — LORAZEPAM 2 MG/ML
0.5 INJECTION INTRAMUSCULAR EVERY 4 HOURS PRN
Status: DISCONTINUED | OUTPATIENT
Start: 2019-01-17 | End: 2019-01-19 | Stop reason: HOSPADM

## 2019-01-17 RX ORDER — LORAZEPAM 2 MG/ML
1 INJECTION INTRAMUSCULAR
Status: DISCONTINUED | OUTPATIENT
Start: 2019-01-17 | End: 2019-01-19 | Stop reason: HOSPADM

## 2019-01-17 RX ORDER — LORAZEPAM 1 MG/1
2 TABLET ORAL
Status: DISCONTINUED | OUTPATIENT
Start: 2019-01-17 | End: 2019-01-19 | Stop reason: HOSPADM

## 2019-01-17 RX ORDER — LORAZEPAM 2 MG/ML
1.5 INJECTION INTRAMUSCULAR
Status: DISCONTINUED | OUTPATIENT
Start: 2019-01-17 | End: 2019-01-19 | Stop reason: HOSPADM

## 2019-01-17 RX ORDER — ONDANSETRON 4 MG/1
4 TABLET, ORALLY DISINTEGRATING ORAL EVERY 4 HOURS PRN
Status: DISCONTINUED | OUTPATIENT
Start: 2019-01-17 | End: 2019-01-19 | Stop reason: HOSPADM

## 2019-01-17 RX ORDER — BISACODYL 10 MG
10 SUPPOSITORY, RECTAL RECTAL
Status: DISCONTINUED | OUTPATIENT
Start: 2019-01-17 | End: 2019-01-19 | Stop reason: HOSPADM

## 2019-01-17 RX ORDER — POLYETHYLENE GLYCOL 3350 17 G/17G
1 POWDER, FOR SOLUTION ORAL
Status: DISCONTINUED | OUTPATIENT
Start: 2019-01-17 | End: 2019-01-19 | Stop reason: HOSPADM

## 2019-01-17 RX ORDER — LORAZEPAM 1 MG/1
4 TABLET ORAL
Status: DISCONTINUED | OUTPATIENT
Start: 2019-01-17 | End: 2019-01-19 | Stop reason: HOSPADM

## 2019-01-17 RX ORDER — AMOXICILLIN 250 MG
2 CAPSULE ORAL 2 TIMES DAILY
Status: DISCONTINUED | OUTPATIENT
Start: 2019-01-18 | End: 2019-01-19 | Stop reason: HOSPADM

## 2019-01-17 RX ORDER — LORAZEPAM 1 MG/1
1 TABLET ORAL EVERY 4 HOURS PRN
Status: DISCONTINUED | OUTPATIENT
Start: 2019-01-17 | End: 2019-01-19 | Stop reason: HOSPADM

## 2019-01-17 RX ORDER — SODIUM CHLORIDE 9 MG/ML
INJECTION, SOLUTION INTRAVENOUS CONTINUOUS
Status: DISCONTINUED | OUTPATIENT
Start: 2019-01-18 | End: 2019-01-19 | Stop reason: HOSPADM

## 2019-01-17 RX ORDER — ONDANSETRON 2 MG/ML
4 INJECTION INTRAMUSCULAR; INTRAVENOUS EVERY 4 HOURS PRN
Status: DISCONTINUED | OUTPATIENT
Start: 2019-01-17 | End: 2019-01-19 | Stop reason: HOSPADM

## 2019-01-17 RX ORDER — LORAZEPAM 1 MG/1
3 TABLET ORAL
Status: DISCONTINUED | OUTPATIENT
Start: 2019-01-17 | End: 2019-01-19 | Stop reason: HOSPADM

## 2019-01-17 RX ORDER — THIAMINE MONONITRATE (VIT B1) 100 MG
100 TABLET ORAL DAILY
Status: DISCONTINUED | OUTPATIENT
Start: 2019-01-18 | End: 2019-01-19 | Stop reason: HOSPADM

## 2019-01-17 RX ORDER — FOLIC ACID 1 MG/1
1 TABLET ORAL DAILY
Status: DISCONTINUED | OUTPATIENT
Start: 2019-01-18 | End: 2019-01-19 | Stop reason: HOSPADM

## 2019-01-17 RX ORDER — CHLORDIAZEPOXIDE HYDROCHLORIDE 25 MG/1
50 CAPSULE, GELATIN COATED ORAL EVERY 6 HOURS
Status: COMPLETED | OUTPATIENT
Start: 2019-01-18 | End: 2019-01-18

## 2019-01-17 RX ORDER — LORAZEPAM 2 MG/ML
2 INJECTION INTRAMUSCULAR ONCE
Status: COMPLETED | OUTPATIENT
Start: 2019-01-17 | End: 2019-01-17

## 2019-01-17 RX ORDER — SODIUM CHLORIDE 9 MG/ML
1000 INJECTION, SOLUTION INTRAVENOUS ONCE
Status: COMPLETED | OUTPATIENT
Start: 2019-01-17 | End: 2019-01-18

## 2019-01-17 RX ORDER — CHLORDIAZEPOXIDE HYDROCHLORIDE 25 MG/1
25 CAPSULE, GELATIN COATED ORAL EVERY 6 HOURS
Status: DISCONTINUED | OUTPATIENT
Start: 2019-01-19 | End: 2019-01-19 | Stop reason: HOSPADM

## 2019-01-17 RX ORDER — LORAZEPAM 2 MG/ML
2 INJECTION INTRAMUSCULAR
Status: DISCONTINUED | OUTPATIENT
Start: 2019-01-17 | End: 2019-01-19 | Stop reason: HOSPADM

## 2019-01-17 RX ADMIN — LORAZEPAM 2 MG: 2 INJECTION INTRAMUSCULAR at 23:23

## 2019-01-17 RX ADMIN — SODIUM CHLORIDE 1000 ML: 9 INJECTION, SOLUTION INTRAVENOUS at 23:21

## 2019-01-17 ASSESSMENT — PAIN SCALES - GENERAL: PAINLEVEL_OUTOF10: 0

## 2019-01-18 ENCOUNTER — APPOINTMENT (OUTPATIENT)
Dept: RADIOLOGY | Facility: MEDICAL CENTER | Age: 39
DRG: 894 | End: 2019-01-18
Attending: INTERNAL MEDICINE
Payer: COMMERCIAL

## 2019-01-18 PROBLEM — F10.939 ALCOHOL WITHDRAWAL SEIZURE (HCC): Status: ACTIVE | Noted: 2019-01-18

## 2019-01-18 PROBLEM — F10.939 ALCOHOL WITHDRAWAL (HCC): Status: ACTIVE | Noted: 2019-01-18

## 2019-01-18 PROBLEM — S00.93XA TRAUMATIC HEMATOMA OF HEAD: Status: ACTIVE | Noted: 2019-01-18

## 2019-01-18 PROBLEM — R56.9 ALCOHOL WITHDRAWAL SEIZURE (HCC): Status: ACTIVE | Noted: 2019-01-18

## 2019-01-18 PROBLEM — E83.51 HYPOCALCEMIA: Status: ACTIVE | Noted: 2019-01-18

## 2019-01-18 PROBLEM — D69.6 THROMBOCYTOPENIA (HCC): Status: ACTIVE | Noted: 2019-01-18

## 2019-01-18 LAB
ALBUMIN SERPL BCP-MCNC: 3.4 G/DL (ref 3.2–4.9)
ALBUMIN/GLOB SERPL: 1.1 G/DL
ALP SERPL-CCNC: 125 U/L (ref 30–99)
ALT SERPL-CCNC: 31 U/L (ref 2–50)
ANION GAP SERPL CALC-SCNC: 14 MMOL/L (ref 0–11.9)
APPEARANCE UR: CLEAR
AST SERPL-CCNC: 168 U/L (ref 12–45)
BILIRUB SERPL-MCNC: 1.4 MG/DL (ref 0.1–1.5)
BILIRUB UR QL STRIP.AUTO: NEGATIVE
BUN SERPL-MCNC: 6 MG/DL (ref 8–22)
CALCIUM SERPL-MCNC: 6.7 MG/DL (ref 8.5–10.5)
CHLORIDE SERPL-SCNC: 102 MMOL/L (ref 96–112)
CO2 SERPL-SCNC: 21 MMOL/L (ref 20–33)
COLOR UR: YELLOW
CREAT SERPL-MCNC: 0.79 MG/DL (ref 0.5–1.4)
EKG IMPRESSION: NORMAL
ETHANOL BLD-MCNC: 0 G/DL
GLOBULIN SER CALC-MCNC: 3 G/DL (ref 1.9–3.5)
GLUCOSE SERPL-MCNC: 92 MG/DL (ref 65–99)
GLUCOSE UR STRIP.AUTO-MCNC: NEGATIVE MG/DL
KETONES UR STRIP.AUTO-MCNC: NEGATIVE MG/DL
LEUKOCYTE ESTERASE UR QL STRIP.AUTO: NEGATIVE
MICRO URNS: NORMAL
NITRITE UR QL STRIP.AUTO: NEGATIVE
PH UR STRIP.AUTO: 7 [PH]
POTASSIUM SERPL-SCNC: 3.3 MMOL/L (ref 3.6–5.5)
PROT SERPL-MCNC: 6.4 G/DL (ref 6–8.2)
PROT UR QL STRIP: NEGATIVE MG/DL
RBC UR QL AUTO: NEGATIVE
SODIUM SERPL-SCNC: 137 MMOL/L (ref 135–145)
SP GR UR STRIP.AUTO: 1
UROBILINOGEN UR STRIP.AUTO-MCNC: 0.2 MG/DL

## 2019-01-18 PROCEDURE — 700102 HCHG RX REV CODE 250 W/ 637 OVERRIDE(OP): Mod: EDC | Performed by: HOSPITALIST

## 2019-01-18 PROCEDURE — 700117 HCHG RX CONTRAST REV CODE 255: Mod: EDC | Performed by: INTERNAL MEDICINE

## 2019-01-18 PROCEDURE — 700111 HCHG RX REV CODE 636 W/ 250 OVERRIDE (IP): Mod: EDC | Performed by: HOSPITALIST

## 2019-01-18 PROCEDURE — 700105 HCHG RX REV CODE 258: Mod: EDC | Performed by: HOSPITALIST

## 2019-01-18 PROCEDURE — 70553 MRI BRAIN STEM W/O & W/DYE: CPT

## 2019-01-18 PROCEDURE — 700105 HCHG RX REV CODE 258: Mod: EDC

## 2019-01-18 PROCEDURE — A9585 GADOBUTROL INJECTION: HCPCS | Mod: EDC | Performed by: INTERNAL MEDICINE

## 2019-01-18 PROCEDURE — 97161 PT EVAL LOW COMPLEX 20 MIN: CPT | Mod: EDC

## 2019-01-18 PROCEDURE — 99233 SBSQ HOSP IP/OBS HIGH 50: CPT | Performed by: INTERNAL MEDICINE

## 2019-01-18 PROCEDURE — 93010 ELECTROCARDIOGRAM REPORT: CPT | Performed by: INTERNAL MEDICINE

## 2019-01-18 PROCEDURE — 93005 ELECTROCARDIOGRAM TRACING: CPT | Mod: EDC | Performed by: HOSPITALIST

## 2019-01-18 PROCEDURE — 770020 HCHG ROOM/CARE - TELE (206): Mod: EDC

## 2019-01-18 PROCEDURE — A9270 NON-COVERED ITEM OR SERVICE: HCPCS | Mod: EDC | Performed by: HOSPITALIST

## 2019-01-18 PROCEDURE — 700102 HCHG RX REV CODE 250 W/ 637 OVERRIDE(OP): Mod: EDC | Performed by: INTERNAL MEDICINE

## 2019-01-18 PROCEDURE — 97165 OT EVAL LOW COMPLEX 30 MIN: CPT | Mod: EDC

## 2019-01-18 PROCEDURE — 81003 URINALYSIS AUTO W/O SCOPE: CPT | Mod: EDC

## 2019-01-18 PROCEDURE — 700101 HCHG RX REV CODE 250: Mod: EDC | Performed by: HOSPITALIST

## 2019-01-18 PROCEDURE — A9270 NON-COVERED ITEM OR SERVICE: HCPCS | Mod: EDC | Performed by: INTERNAL MEDICINE

## 2019-01-18 RX ORDER — TRAZODONE HYDROCHLORIDE 100 MG/1
100 TABLET ORAL NIGHTLY PRN
Status: DISCONTINUED | OUTPATIENT
Start: 2019-01-18 | End: 2019-01-19 | Stop reason: HOSPADM

## 2019-01-18 RX ORDER — GADOBUTROL 604.72 MG/ML
5.5 INJECTION INTRAVENOUS ONCE
Status: COMPLETED | OUTPATIENT
Start: 2019-01-18 | End: 2019-01-18

## 2019-01-18 RX ORDER — LISINOPRIL 20 MG/1
20 TABLET ORAL
Status: DISCONTINUED | OUTPATIENT
Start: 2019-01-18 | End: 2019-01-19 | Stop reason: HOSPADM

## 2019-01-18 RX ORDER — IBUPROFEN 200 MG
600 TABLET ORAL EVERY 6 HOURS PRN
COMMUNITY
End: 2020-09-14

## 2019-01-18 RX ORDER — FLUOXETINE HYDROCHLORIDE 20 MG/1
20 CAPSULE ORAL DAILY
Status: DISCONTINUED | OUTPATIENT
Start: 2019-01-18 | End: 2019-01-19 | Stop reason: HOSPADM

## 2019-01-18 RX ORDER — HYDROCHLOROTHIAZIDE 25 MG/1
25 TABLET ORAL
Status: DISCONTINUED | OUTPATIENT
Start: 2019-01-18 | End: 2019-01-19

## 2019-01-18 RX ORDER — SODIUM CHLORIDE 9 MG/ML
INJECTION, SOLUTION INTRAVENOUS
Status: COMPLETED
Start: 2019-01-18 | End: 2019-01-18

## 2019-01-18 RX ORDER — ACAMPROSATE CALCIUM 333 MG/1
333 TABLET, DELAYED RELEASE ORAL 3 TIMES DAILY
Refills: 0 | COMMUNITY
Start: 2019-01-05 | End: 2019-03-01 | Stop reason: SDUPTHER

## 2019-01-18 RX ORDER — LISINOPRIL AND HYDROCHLOROTHIAZIDE 25; 20 MG/1; MG/1
1 TABLET ORAL DAILY
Status: DISCONTINUED | OUTPATIENT
Start: 2019-01-18 | End: 2019-01-18

## 2019-01-18 RX ORDER — TOPIRAMATE 100 MG/1
100 TABLET, FILM COATED ORAL 2 TIMES DAILY
Status: DISCONTINUED | OUTPATIENT
Start: 2019-01-18 | End: 2019-01-19 | Stop reason: HOSPADM

## 2019-01-18 RX ORDER — CALCIUM CARBONATE 500 MG/1
500 TABLET, CHEWABLE ORAL
Status: DISCONTINUED | OUTPATIENT
Start: 2019-01-18 | End: 2019-01-19 | Stop reason: HOSPADM

## 2019-01-18 RX ORDER — CEFDINIR 300 MG/1
300 CAPSULE ORAL 2 TIMES DAILY
Status: ON HOLD | COMMUNITY
Start: 2019-01-01 | End: 2019-01-21

## 2019-01-18 RX ORDER — TRAZODONE HYDROCHLORIDE 100 MG/1
100-200 TABLET ORAL
COMMUNITY
End: 2019-03-01

## 2019-01-18 RX ORDER — POTASSIUM CHLORIDE 20 MEQ/1
40 TABLET, EXTENDED RELEASE ORAL ONCE
Status: COMPLETED | OUTPATIENT
Start: 2019-01-18 | End: 2019-01-18

## 2019-01-18 RX ORDER — DISULFIRAM 250 MG/1
250 TABLET ORAL DAILY
Refills: 0 | COMMUNITY
Start: 2019-01-05 | End: 2019-03-01 | Stop reason: SDUPTHER

## 2019-01-18 RX ORDER — CALCIUM CHLORIDE 100 MG/ML
1 INJECTION INTRAVENOUS; INTRAVENTRICULAR ONCE
Status: DISCONTINUED | OUTPATIENT
Start: 2019-01-18 | End: 2019-01-18

## 2019-01-18 RX ADMIN — THERA TABS 1 TABLET: TAB at 05:30

## 2019-01-18 RX ADMIN — SODIUM CHLORIDE: 9 INJECTION, SOLUTION INTRAVENOUS at 20:23

## 2019-01-18 RX ADMIN — TOPIRAMATE 100 MG: 100 TABLET, FILM COATED ORAL at 05:30

## 2019-01-18 RX ADMIN — FOLIC ACID 1 MG: 1 TABLET ORAL at 05:30

## 2019-01-18 RX ADMIN — TRAZODONE HYDROCHLORIDE 100 MG: 100 TABLET ORAL at 03:24

## 2019-01-18 RX ADMIN — SODIUM CHLORIDE 500 ML: 9 INJECTION, SOLUTION INTRAVENOUS at 03:24

## 2019-01-18 RX ADMIN — LORAZEPAM 2 MG: 1 TABLET ORAL at 20:30

## 2019-01-18 RX ADMIN — Medication 100 MG: at 05:30

## 2019-01-18 RX ADMIN — LORAZEPAM 2 MG: 1 TABLET ORAL at 22:30

## 2019-01-18 RX ADMIN — LISINOPRIL 20 MG: 20 TABLET ORAL at 05:29

## 2019-01-18 RX ADMIN — CALCIUM CHLORIDE 1 G: 100 INJECTION, SOLUTION INTRAVENOUS at 02:58

## 2019-01-18 RX ADMIN — CHLORDIAZEPOXIDE HYDROCHLORIDE 50 MG: 25 CAPSULE ORAL at 20:55

## 2019-01-18 RX ADMIN — TOPIRAMATE 100 MG: 100 TABLET, FILM COATED ORAL at 17:10

## 2019-01-18 RX ADMIN — CHLORDIAZEPOXIDE HYDROCHLORIDE 50 MG: 25 CAPSULE ORAL at 08:17

## 2019-01-18 RX ADMIN — LORAZEPAM 1 MG: 2 INJECTION INTRAMUSCULAR; INTRAVENOUS at 23:36

## 2019-01-18 RX ADMIN — CHLORDIAZEPOXIDE HYDROCHLORIDE 50 MG: 25 CAPSULE ORAL at 02:58

## 2019-01-18 RX ADMIN — SODIUM CHLORIDE: 9 INJECTION, SOLUTION INTRAVENOUS at 02:59

## 2019-01-18 RX ADMIN — TRAZODONE HYDROCHLORIDE 100 MG: 100 TABLET ORAL at 20:55

## 2019-01-18 RX ADMIN — LORAZEPAM 1 MG: 1 TABLET ORAL at 12:32

## 2019-01-18 RX ADMIN — LORAZEPAM 1 MG: 1 TABLET ORAL at 18:18

## 2019-01-18 RX ADMIN — GADOBUTROL 5.5 ML: 604.72 INJECTION INTRAVENOUS at 16:51

## 2019-01-18 RX ADMIN — LORAZEPAM 1 MG: 1 TABLET ORAL at 08:17

## 2019-01-18 RX ADMIN — ANTACID TABLETS 500 MG: 500 TABLET, CHEWABLE ORAL at 12:32

## 2019-01-18 RX ADMIN — POTASSIUM CHLORIDE: 2 INJECTION, SOLUTION, CONCENTRATE INTRAVENOUS at 04:00

## 2019-01-18 RX ADMIN — FLUOXETINE HYDROCHLORIDE 20 MG: 20 CAPSULE ORAL at 05:30

## 2019-01-18 RX ADMIN — POTASSIUM CHLORIDE 40 MEQ: 1500 TABLET, EXTENDED RELEASE ORAL at 02:58

## 2019-01-18 RX ADMIN — LORAZEPAM 2 MG: 1 TABLET ORAL at 15:52

## 2019-01-18 RX ADMIN — ANTACID TABLETS 500 MG: 500 TABLET, CHEWABLE ORAL at 17:10

## 2019-01-18 RX ADMIN — CHLORDIAZEPOXIDE HYDROCHLORIDE 50 MG: 25 CAPSULE ORAL at 15:52

## 2019-01-18 RX ADMIN — HYDROCHLOROTHIAZIDE 25 MG: 25 TABLET ORAL at 05:30

## 2019-01-18 ASSESSMENT — LIFESTYLE VARIABLES
AUDITORY DISTURBANCES: NOT PRESENT
AGITATION: *
AUDITORY DISTURBANCES: NOT PRESENT
TREMOR: *
AGITATION: MODERATELY FIDGETY AND RESTLESS
TOTAL SCORE: 14
NAUSEA AND VOMITING: NO NAUSEA AND NO VOMITING
HAVE PEOPLE ANNOYED YOU BY CRITICIZING YOUR DRINKING: YES
ANXIETY: *
CONSUMPTION TOTAL: POSITIVE
ANXIETY: *
NAUSEA AND VOMITING: NO NAUSEA AND NO VOMITING
ORIENTATION AND CLOUDING OF SENSORIUM: ORIENTED AND CAN DO SERIAL ADDITIONS
ORIENTATION AND CLOUDING OF SENSORIUM: ORIENTED AND CAN DO SERIAL ADDITIONS
NAUSEA AND VOMITING: NO NAUSEA AND NO VOMITING
HOW MANY TIMES IN THE PAST YEAR HAVE YOU HAD 5 OR MORE DRINKS IN A DAY: 200
VISUAL DISTURBANCES: NOT PRESENT
ORIENTATION AND CLOUDING OF SENSORIUM: ORIENTED AND CAN DO SERIAL ADDITIONS
TREMOR: *
ANXIETY: MODERATELY ANXIOUS OR GUARDED, SO ANXIETY IS INFERRED
PAROXYSMAL SWEATS: BARELY PERCEPTIBLE SWEATING. PALMS MOIST
AUDITORY DISTURBANCES: NOT PRESENT
ANXIETY: *
TREMOR: *
VISUAL DISTURBANCES: MILD SENSITIVITY
NAUSEA AND VOMITING: NO NAUSEA AND NO VOMITING
NAUSEA AND VOMITING: NO NAUSEA AND NO VOMITING
HEADACHE, FULLNESS IN HEAD: NOT PRESENT
AUDITORY DISTURBANCES: NOT PRESENT
AGITATION: MODERATELY FIDGETY AND RESTLESS
ALCOHOL_USE: YES
VISUAL DISTURBANCES: NOT PRESENT
TREMOR: *
PAROXYSMAL SWEATS: BARELY PERCEPTIBLE SWEATING. PALMS MOIST
EVER_SMOKED: NEVER
PAROXYSMAL SWEATS: NO SWEAT VISIBLE
HEADACHE, FULLNESS IN HEAD: NOT PRESENT
TOTAL SCORE: 11
ORIENTATION AND CLOUDING OF SENSORIUM: ORIENTED AND CAN DO SERIAL ADDITIONS
HAVE YOU EVER FELT YOU SHOULD CUT DOWN ON YOUR DRINKING: YES
ANXIETY: MILDLY ANXIOUS
EVER FELT BAD OR GUILTY ABOUT YOUR DRINKING: YES
ON A TYPICAL DAY WHEN YOU DRINK ALCOHOL HOW MANY DRINKS DO YOU HAVE: 10
ANXIETY: MODERATELY ANXIOUS OR GUARDED, SO ANXIETY IS INFERRED
VISUAL DISTURBANCES: NOT PRESENT
PAROXYSMAL SWEATS: NO SWEAT VISIBLE
HEADACHE, FULLNESS IN HEAD: NOT PRESENT
HEADACHE, FULLNESS IN HEAD: NOT PRESENT
ORIENTATION AND CLOUDING OF SENSORIUM: ORIENTED AND CAN DO SERIAL ADDITIONS
TOTAL SCORE: 8
SUBSTANCE_ABUSE: 0
TOTAL SCORE: 12
AGITATION: MODERATELY FIDGETY AND RESTLESS
VISUAL DISTURBANCES: NOT PRESENT
TOTAL SCORE: 3
AUDITORY DISTURBANCES: NOT PRESENT
AUDITORY DISTURBANCES: NOT PRESENT
AGITATION: *
TOTAL SCORE: 8
AGITATION: *
ORIENTATION AND CLOUDING OF SENSORIUM: ORIENTED AND CAN DO SERIAL ADDITIONS
HEADACHE, FULLNESS IN HEAD: VERY MILD
ANXIETY: *
VISUAL DISTURBANCES: NOT PRESENT
ORIENTATION AND CLOUDING OF SENSORIUM: ORIENTED AND CAN DO SERIAL ADDITIONS
TREMOR: *
TOTAL SCORE: 3
PAROXYSMAL SWEATS: BARELY PERCEPTIBLE SWEATING. PALMS MOIST
DOES PATIENT WANT TO TALK TO SOMEONE ABOUT QUITTING: YES
HEADACHE, FULLNESS IN HEAD: NOT PRESENT
PAROXYSMAL SWEATS: BARELY PERCEPTIBLE SWEATING. PALMS MOIST
AGITATION: *
AUDITORY DISTURBANCES: NOT PRESENT
ORIENTATION AND CLOUDING OF SENSORIUM: ORIENTED AND CAN DO SERIAL ADDITIONS
TOTAL SCORE: 8
TOTAL SCORE: 5
HEADACHE, FULLNESS IN HEAD: VERY MILD
NAUSEA AND VOMITING: NO NAUSEA AND NO VOMITING
AGITATION: NORMAL ACTIVITY
HEADACHE, FULLNESS IN HEAD: NOT PRESENT
AUDITORY DISTURBANCES: NOT PRESENT
TOTAL SCORE: 11
TREMOR: *
TREMOR: *
TOTAL SCORE: 3
ANXIETY: *
NAUSEA AND VOMITING: NO NAUSEA AND NO VOMITING
EVER HAD A DRINK FIRST THING IN THE MORNING TO STEADY YOUR NERVES TO GET RID OF A HANGOVER: NO
AVERAGE NUMBER OF DAYS PER WEEK YOU HAVE A DRINK CONTAINING ALCOHOL: 5
DOES PATIENT WANT TO STOP DRINKING: YES
PAROXYSMAL SWEATS: NO SWEAT VISIBLE
TREMOR: MODERATE TREMOR WITH ARMS EXTENDED
PAROXYSMAL SWEATS: NO SWEAT VISIBLE
NAUSEA AND VOMITING: NO NAUSEA AND NO VOMITING

## 2019-01-18 ASSESSMENT — COPD QUESTIONNAIRES
DO YOU EVER COUGH UP ANY MUCUS OR PHLEGM?: NO/ONLY WITH OCCASIONAL COLDS OR INFECTIONS
IN THE PAST 12 MONTHS DO YOU DO LESS THAN YOU USED TO BECAUSE OF YOUR BREATHING PROBLEMS: DISAGREE/UNSURE
COPD SCREENING SCORE: 0
DURING THE PAST 4 WEEKS HOW MUCH DID YOU FEEL SHORT OF BREATH: NONE/LITTLE OF THE TIME
HAVE YOU SMOKED AT LEAST 100 CIGARETTES IN YOUR ENTIRE LIFE: NO/DON'T KNOW

## 2019-01-18 ASSESSMENT — ENCOUNTER SYMPTOMS
NAUSEA: 0
PALPITATIONS: 0
SHORTNESS OF BREATH: 0
HALLUCINATIONS: 0
COUGH: 0
FOCAL WEAKNESS: 0
LOSS OF CONSCIOUSNESS: 1
DEPRESSION: 0
FALLS: 1
BRUISES/BLEEDS EASILY: 0
FLANK PAIN: 0
SENSORY CHANGE: 0
BLURRED VISION: 0
SPEECH CHANGE: 0
VOMITING: 0
TREMORS: 1
HEARTBURN: 0
MYALGIAS: 0
ABDOMINAL PAIN: 0
EYE DISCHARGE: 0
SEIZURES: 1
CHILLS: 0
DOUBLE VISION: 0
WEAKNESS: 0
DIZZINESS: 0
FEVER: 0
HEMOPTYSIS: 0

## 2019-01-18 ASSESSMENT — COGNITIVE AND FUNCTIONAL STATUS - GENERAL
DAILY ACTIVITIY SCORE: 24
DAILY ACTIVITIY SCORE: 24
MOBILITY SCORE: 24
SUGGESTED CMS G CODE MODIFIER DAILY ACTIVITY: CH
SUGGESTED CMS G CODE MODIFIER MOBILITY: CH
SUGGESTED CMS G CODE MODIFIER DAILY ACTIVITY: CH
MOBILITY SCORE: 24
SUGGESTED CMS G CODE MODIFIER MOBILITY: CH

## 2019-01-18 ASSESSMENT — GAIT ASSESSMENTS
GAIT LEVEL OF ASSIST: STAND BY ASSIST
DISTANCE (FEET): 200

## 2019-01-18 ASSESSMENT — PATIENT HEALTH QUESTIONNAIRE - PHQ9
2. FEELING DOWN, DEPRESSED, IRRITABLE, OR HOPELESS: NOT AT ALL
1. LITTLE INTEREST OR PLEASURE IN DOING THINGS: NOT AT ALL
SUM OF ALL RESPONSES TO PHQ9 QUESTIONS 1 AND 2: 0

## 2019-01-18 ASSESSMENT — PAIN SCALES - GENERAL: PAINLEVEL_OUTOF10: 0

## 2019-01-18 ASSESSMENT — ACTIVITIES OF DAILY LIVING (ADL): TOILETING: INDEPENDENT

## 2019-01-18 NOTE — CARE PLAN
Problem: Safety  Goal: Will remain free from injury  Outcome: PROGRESSING AS EXPECTED  Patient deliberately taking part in all fall interventions.    Problem: Infection  Goal: Will remain free from infection  Outcome: PROGRESSING AS EXPECTED  Patient taking part in all infection prevention measures. Verbalizes importance.

## 2019-01-18 NOTE — H&P
Hospital Medicine History & Physical Note    Date of Service  1/17/2019    Primary Care Physician  David Coughlin M.D.    Consultants  none    Code Status  full    Chief Complaint  Seizure, GLF    History of Presenting Illness  38 y.o. female who presented 1/17/2019 with evaluation of seizure.  This patient is an alcoholic who stopped drinking 2 days ago.  Significant other at the bedside who witnessed the event she suddenly became stiff fell to the ground brief period of generalized shaking.  She did demonstrate a postictal state afterwards she was minimally responsive confused.  At the time of my examination her confusion has improved she is alert and oriented.  She states her last drink was Tuesday however she cannot remember if she drank over the last 2 days as well.  She has had previous episodes of alcohol withdrawal.  She will be admitted to the hospital with alcohol withdrawal seizures continued monitoring.    Review of Systems  Review of Systems   Constitutional: Negative for chills and fever.   HENT: Negative for congestion, hearing loss and tinnitus.    Eyes: Negative for blurred vision, double vision and discharge.   Respiratory: Negative for cough, hemoptysis and shortness of breath.    Cardiovascular: Negative for chest pain, palpitations and leg swelling.   Gastrointestinal: Negative for abdominal pain, heartburn, nausea and vomiting.   Genitourinary: Negative for dysuria and flank pain.   Musculoskeletal: Positive for falls. Negative for joint pain and myalgias.   Skin: Negative for rash.   Neurological: Positive for tremors and seizures. Negative for dizziness, sensory change, speech change, focal weakness and weakness.   Endo/Heme/Allergies: Negative for environmental allergies. Does not bruise/bleed easily.   Psychiatric/Behavioral: Negative for depression, hallucinations and substance abuse.       Past Medical History   has a past medical history of Alcoholism (HCC); Anemia; Anesthesia;  Anxiety; Dialysis (2008); Heart burn; Hypertension; Kidney failure (2008); Liver failure (HCC); Other specified symptom associated with female genital organs; Pancreatitis; and Wrist pain.    Surgical History   has a past surgical history that includes lissa by laparoscopy (2006); laparoscopy (2000); and flexor tendon repair (7/18/2012).     Family History  family history includes Alcohol abuse in her father, paternal aunt, paternal grandfather, and paternal grandmother; Diabetes in her unknown relative; Drug abuse in her father and paternal aunt; Hypertension in her unknown relative; Psychiatry in her paternal grandmother; Stroke in her unknown relative.     Social History   reports that she has never smoked. She has never used smokeless tobacco. She reports that she drinks about 36.0 oz of alcohol per week . She reports that she does not use drugs.    Allergies  Allergies   Allergen Reactions   • Nkda [No Known Drug Allergy]        Medications  Prior to Admission Medications   Prescriptions Last Dose Informant Patient Reported? Taking?   Cyanocobalamin (B-12) 1000 MCG Cap   No No   Sig: Take 1 Cap by mouth every day.   LO LOESTRIN FE 1 MG-10 MCG / 10 MCG Tab   Yes No   calcium carbonate (TUMS) 500 MG Chew Tab   No No   Sig: Take 1 Tab by mouth every day.   Patient not taking: Reported on 1/11/2019   cefdinir (OMNICEF) 300 MG Cap   No No   Sig: Take 1 Cap by mouth 2 times a day.   fluoxetine (PROZAC) 20 MG Cap   No No   Sig: Take 1 Cap by mouth every day.   lisinopril-hydrochlorothiazide (PRINZIDE, ZESTORETIC) 20-25 MG per tablet   Yes No   Sig: Take 1 Tab by mouth every day.   naltrexone (DEPADE) 50 MG Tab   No No   Sig: Take 1 Tab by mouth every day.   thiamine (THIAMINE) 100 MG tablet   No No   Sig: Take 1 Tab by mouth every day.   Patient not taking: Reported on 1/11/2019   topiramate (TOPAMAX) 100 MG Tab   No No   Sig: Take 1 Tab by mouth 2 times a day.   trazodone (DESYREL) 100 MG Tab   No No   Sig: Take 1  Tab by mouth at bedtime as needed for Sleep.      Facility-Administered Medications: None       Physical Exam  Temp:  [37.3 °C (99.2 °F)] 37.3 °C (99.2 °F)  Pulse:  [71-97] 71  Resp:  [18] 18  BP: (142-155)/() 142/99  SpO2:  [100 %] 100 %    Physical Exam   Constitutional: She is oriented to person, place, and time. She appears well-developed and well-nourished. She appears distressed.   HENT:   Head: Normocephalic and atraumatic.   Eyes: Pupils are equal, round, and reactive to light. Conjunctivae and EOM are normal.   Neck: Normal range of motion. Neck supple. No JVD present.   Cardiovascular: Normal rate, regular rhythm, normal heart sounds and intact distal pulses.    No murmur heard.  Pulmonary/Chest: Effort normal and breath sounds normal. No respiratory distress. She has no wheezes.   Abdominal: Soft. Bowel sounds are normal. She exhibits no distension. There is no tenderness.   Musculoskeletal:   tremor   Neurological: She is alert and oriented to person, place, and time. She exhibits normal muscle tone.   Skin: Skin is warm and dry.   Psychiatric:   anxious   Nursing note and vitals reviewed.      Laboratory:  Recent Labs      01/17/19   2318   WBC  5.5   RBC  2.78*   HEMOGLOBIN  9.1*   HEMATOCRIT  26.9*   MCV  96.8   MCH  32.7   MCHC  33.8   RDW  49.9   PLATELETCT  148*   MPV  11.3         No results for input(s): ALTSGPT, ASTSGOT, ALKPHOSPHAT, TBILIRUBIN, DBILIRUBIN, GAMMAGT, AMYLASE, LIPASE, ALB, PREALBUMIN, GLUCOSE in the last 72 hours.              No results for input(s): TROPONINI in the last 72 hours.    Urinalysis:    No results found     Imaging:  CT-HEAD W/O   Final Result         1. No CT evidence of acute infarct, hemorrhage or mass. No acute intracranial injury.   2. Subcutaneous/subgaleal hematoma of the right parieto-occipital scalp.   3. Mild global parenchymal atrophy.            Assessment/Plan:  I anticipate this patient will require at least two midnights for appropriate medical  management, necessitating inpatient admission.    * Alcohol withdrawal (HCC)   Assessment & Plan    Alcohol withdrawals with hx of seizures   CIWA ordered  Librium taper   Replace lytes as needed  Tele for now   Seizure precautions     Hypokalemia- (present on admission)   Assessment & Plan    Replace and recheck      Traumatic hematoma of head   Assessment & Plan    Due to fall from seizure   Cont pain control   Monitor for concussive symptoms  neurochecks        Hypocalcemia   Assessment & Plan    Replace and recheck      Thrombocytopenia (HCC)   Assessment & Plan    Likely due to alcohol abuse cont to monitor      Alcohol withdrawal seizure (HCC)   Assessment & Plan    High likelyhood of alcohol withdrawal seizures  On topamax will resume   Treat withdrawal      Chronic insomnia- (present on admission)   Assessment & Plan    Due to chronic alcohol abuse   Resume home trazadone       Transaminitis- (present on admission)   Assessment & Plan    Consistent with alcohol abuse   Con to monitor     Anemia- (present on admission)   Assessment & Plan    Moderate, no evidence of bleeding   Recheck in am   Lab workup ordered         VTE prophylaxis: heparin

## 2019-01-18 NOTE — ED TRIAGE NOTES
"Chief Complaint   Patient presents with   • GLF       Pt brought in by EMS for a MGLF. On EMS Arrival pt was A&Ox2 with baseline tremors. Pt's boyfriend reported that he thought the pt \"had a seizure\".      EMS Vitals:  BP: 155/109  HR: 89  RR: 20  Spo2: 100 on 2L     EMS Interventions: 18 G IV, 250ml NS bolus    On arrival to ED pt is A&O x 4.    Pt has Hx significant for ETOH, tremors.    Pt takes: see list        "

## 2019-01-18 NOTE — PROGRESS NOTES
Med rec complete per pt at bedside & pt home pharmacy  Allergies have been verified   Pt Home [facility]    Pt started a 10 day course of OMNICEF on 01-. Pt can't remember how many capsules she has left at home.     Pt reports that she has not yet started taking ANTABUSE

## 2019-01-18 NOTE — BH THERAPY
Group Therapy Checklist  Attendance: (P) Attended  Attendance Duration (min): (P) 46-60  Number of Participants: (P) 4  Session #: (P) 2  Program/Group: (P) Intensive Outpatient Program  Topics Covered: (P) Stress Management  Participation: (P) Active verbal participation, Attentive  Affect/Mood Range: (P) Normal range, Flexible  Affect/Mood Display: (P) Congruent w/content, Tearful  Cognition: (P) Alert, Oriented  Evidence of Imminent Suicide Risk: (P) No  Evidence of imminent homicide risk: (P) No  Therapeutic Interventions: (P) Emotion clarification, Cognitive clarification  Progress Toward Treatment Goal: (P) Mild improvement

## 2019-01-18 NOTE — ASSESSMENT & PLAN NOTE
Due to fall from seizure   Cont pain control   Monitor for concussive symptoms-none observed  -hold DVT px  neurochecks

## 2019-01-18 NOTE — ED PROVIDER NOTES
"ED Provider Note    CHIEF COMPLAINT  Chief Complaint   Patient presents with   • GLF       HPI  Donna Lindsey Morales is a 38 y.o. female who presents for evaluation of a seizure, no history of seizure, she is an alcoholic who stopped drinking 2-3 days ago.  The significant other at the bedside witnessed the entire event, the patient was walking through the kitchen, she suddenly became stiff and fell to the ground followed by a brief period of generalized shaking.  He states for the next 10 minutes or so she was essentially unresponsive before slowly becoming responsive but ultimately confused and states that this confusion has since cleared up.  The patient states that her last drink was Monday night into Tuesday morning, approximately 2-1/2 days ago, patient states she drank \"a lot\" prior to that and was just prescribed Antabuse although has not taken it.  She has no specific complaints at this time, no headache, no focal weakness or numbness, she has no abdominal pain, dysuria, no chest pain or shortness of breath, no other specific complaints.    REVIEW OF SYSTEMS  Negative for fever, rash, chest pain, dyspnea, abdominal pain, nausea, vomiting, diarrhea, headache, focal weakness, focal numbness, focal tingling, back pain. All other systems are negative.     PAST MEDICAL HISTORY  Past Medical History:   Diagnosis Date   • Alcoholism (HCC)    • Anemia    • Anesthesia     \"woke up before I should have\"   • Anxiety    • Dialysis 2008   • Heart burn    • Hypertension    • Kidney failure 2008    dialysis, resolved \"too much tylenol\"   • Liver failure (HCC)    • Other specified symptom associated with female genital organs     endometriosis   • Pancreatitis    • Wrist pain        FAMILY HISTORY  Family History   Problem Relation Age of Onset   • Alcohol abuse Father    • Drug abuse Father    • Diabetes Unknown    • Stroke Unknown    • Hypertension Unknown    • Alcohol abuse Paternal Grandfather    • Psychiatry " "Paternal Grandmother         agoraphobia   • Alcohol abuse Paternal Grandmother    • Alcohol abuse Paternal Aunt    • Drug abuse Paternal Aunt        SOCIAL HISTORY  Social History   Substance Use Topics   • Smoking status: Never Smoker   • Smokeless tobacco: Never Used   • Alcohol use 36.0 oz/week     60 Shots of liquor per week      Comment: 750 ml Vodka drinks half bottle a day       SURGICAL HISTORY  Past Surgical History:   Procedure Laterality Date   • FLEXOR TENDON REPAIR  7/18/2012    Performed by MAI BRITO at SURGERY [facility]   • AMANDA BY LAPAROSCOPY  2006   • LAPAROSCOPY  2000    endometriosis       CURRENT MEDICATIONS  I personally reviewed the medication list in the charting documentation.     ALLERGIES  Allergies   Allergen Reactions   • Nkda [No Known Drug Allergy]        MEDICAL RECORD  I have reviewed patient's medical record and pertinent results are listed above.      PHYSICAL EXAM  VITAL SIGNS: /105   Pulse 97   Temp 37.3 °C (99.2 °F) (Temporal)   Resp 18   Ht 1.6 m (5' 3\")   Wt 56.7 kg (125 lb)   SpO2 100%   BMI 22.14 kg/m²    Constitutional: Tremulous, somewhat ill-appearing  HENT: Mucus membranes dry.  Small superficial laceration to the tongue.  Abrasion the right side of the face.    Eyes: No scleral icterus. Normal conjunctiva   Neck: Supple, comfortable, nonpainful range of motion.   Cardiovascular: Regular heart rate and rhythm.   Thorax & Lungs: Chest is nontender.  Lungs are clear to auscultation with good air movement bilaterally.  No wheeze, rhonchi, nor rales.   Abdomen: Soft, with no tenderness, rebound nor guarding.  No mass or pulsatile mass appreciated.  Skin: Warm, dry. No rash appreciated  Extremities/Musculoskeletal: No sign of trauma. No asymmetric calf tenderness, erythema or edema. Normal range of motion   Neurologic: Alert & oriented.  No obvious focal deficits appreciated.  Clonus demonstrated at the upper extremities  Psychiatric: Normal " affect appropriate for the clinical situation.    DIAGNOSTIC STUDIES / PROCEDURES    LABS  Results for orders placed or performed during the hospital encounter of 01/17/19   CBC WITH DIFFERENTIAL   Result Value Ref Range    WBC 5.5 4.8 - 10.8 K/uL    RBC 2.78 (L) 4.20 - 5.40 M/uL    Hemoglobin 9.1 (L) 12.0 - 16.0 g/dL    Hematocrit 26.9 (L) 37.0 - 47.0 %    MCV 96.8 81.4 - 97.8 fL    MCH 32.7 27.0 - 33.0 pg    MCHC 33.8 33.6 - 35.0 g/dL    RDW 49.9 35.9 - 50.0 fL    Platelet Count 148 (L) 164 - 446 K/uL    MPV 11.3 9.0 - 12.9 fL    Neutrophils-Polys 66.30 44.00 - 72.00 %    Lymphocytes 26.00 22.00 - 41.00 %    Monocytes 5.80 0.00 - 13.40 %    Eosinophils 0.40 0.00 - 6.90 %    Basophils 1.10 0.00 - 1.80 %    Immature Granulocytes 0.40 0.00 - 0.90 %    Nucleated RBC 0.00 /100 WBC    Neutrophils (Absolute) 3.66 2.00 - 7.15 K/uL    Lymphs (Absolute) 1.43 1.00 - 4.80 K/uL    Monos (Absolute) 0.32 0.00 - 0.85 K/uL    Eos (Absolute) 0.02 0.00 - 0.51 K/uL    Baso (Absolute) 0.06 0.00 - 0.12 K/uL    Immature Granulocytes (abs) 0.02 0.00 - 0.11 K/uL    NRBC (Absolute) 0.00 K/uL   BETA-HCG QUALITATIVE SERUM   Result Value Ref Range    Beta-Hcg Qualitative Serum Negative Negative        RADIOLOGY  CT-HEAD W/O   Final Result         1. No CT evidence of acute infarct, hemorrhage or mass. No acute intracranial injury.   2. Subcutaneous/subgaleal hematoma of the right parieto-occipital scalp.   3. Mild global parenchymal atrophy.            COURSE & MEDICAL DECISION MAKING  I have reviewed any medical record information, laboratory studies and radiographic results as noted above.  Differential diagnoses includes: Alcohol withdrawal seizure, new onset seizure, dehydration, electrolyte abnormality, intracranial injury    Encounter Summary: This is a 38 y.o. female with a presentation consistent with an alcohol withdrawal seizure, history of alcoholism and stopped drinking 2-1/2 days ago, has evidence of facial and head trauma, no  focal deficits appreciated on exam but she is clearly in a acute withdrawal state.  Will treated with IV fluids as I think she is dehydrated, 2 mg of IV Ativan, head CT and blood work and ultimately she will be admitted to the hospital in guarded condition    HYDRATION: Based on the patient's presentation of Dehydration the patient was given IV fluids. IV Hydration was used because oral hydration was not adequate alone. Upon recheck following hydration, the patient was Improving.        DISPOSITION: Admit in guarded condition      FINAL IMPRESSION  1. Alcohol withdrawal seizure without complication (HCC)           This dictation was created using voice recognition software. The accuracy of the dictation is limited to the abilities of the software. I expect there may be some errors of grammar and possibly content. The nursing notes were reviewed and certain aspects of this information were incorporated into this note.    Electronically signed by: Jose Castano, 1/17/2019 10:38 PM

## 2019-01-18 NOTE — BH THERAPY
Group Therapy Checklist  Attendance: (P) Attended  Attendance Duration (min): (P) Greater than 60  Number of Participants: (P) 3  Session #: (P) 2  Program/Group: (P) Intensive Outpatient Program  Topics Covered: (P) Other (Comment): (process group)  Participation: (P) Active verbal participation  Shared that she is worried about how she feels physically that her feet have been tingling and she is having problems with sleep and eating.  States she knows she needs to go to meetings.  Did get a breathalyzer done before group today and was 0.00. Receptive to support from peers.   Affect/Mood Range: (P) Normal range, Flexible  Affect/Mood Display: (P) Congruent w/content, Tearful  Cognition: (P) Alert, Oriented  Evidence of Imminent Suicide Risk: (P) No  Evidence of imminent homicide risk: (P) No  Therapeutic Interventions: (P) Cognitive clarification, Emotion clarification  Progress Toward Treatment Goal: (P) Mild improvement

## 2019-01-18 NOTE — PROGRESS NOTES
[facility] Medicine Daily Progress Note    Date of Service  1/18/2019    Chief Complaint  38 y.o. female admitted 1/17/2019 with ETOH WD and possible related seizure    Hospital Course  See below    Interval Problem Update  ETOH WD-has only had one drink on Monday in the past 6 weeks? CIWA ranges from 6-10. Patient denies any overt shakiness/tremulousness. Patient has not started antabuse yet.     Consultants/Specialty  NONE    Code Status  fc/fc    Disposition  TELE then home    Review of Systems  Review of Systems   Constitutional: Positive for malaise/fatigue.   Cardiovascular: Negative for chest pain.   Gastrointestinal: Negative for nausea and vomiting.   Genitourinary: Negative for dysuria.   Musculoskeletal: Positive for falls.   Skin: Negative for rash.   Neurological: Positive for seizures and loss of consciousness. Negative for dizziness.   Psychiatric/Behavioral: Negative for substance abuse.   All other systems reviewed and are negative.       Physical Exam  Temp:  [36.9 °C (98.5 °F)-37.3 °C (99.2 °F)] 37.3 °C (99.1 °F)  Pulse:  [] 109  Resp:  [16-18] 16  BP: (121-155)/() 137/103  SpO2:  [98 %-100 %] 99 %    Physical Exam   Constitutional: She is oriented to person, place, and time. She appears well-developed and well-nourished. No distress.   HENT:   Head: Normocephalic and atraumatic.   Mouth/Throat: No oropharyngeal exudate.   Eyes: Pupils are equal, round, and reactive to light. No scleral icterus.   Neck: Normal range of motion. Neck supple. No thyromegaly present.   Cardiovascular: Normal rate, regular rhythm, normal heart sounds and intact distal pulses.    No murmur heard.  Pulmonary/Chest: Effort normal and breath sounds normal. No respiratory distress. She has no wheezes.   Abdominal: Soft. Bowel sounds are normal. She exhibits no distension. There is no tenderness.   Musculoskeletal: Normal range of motion. She exhibits no edema or tenderness.   Neurological: She is alert and  oriented to person, place, and time. No cranial nerve deficit.   No tremors noted   Skin: Skin is warm and dry. No rash noted.   Psychiatric: She has a normal mood and affect.   Nursing note and vitals reviewed.      Fluids    Intake/Output Summary (Last 24 hours) at 01/18/19 1451  Last data filed at 01/18/19 0400   Gross per 24 hour   Intake              800 ml   Output                0 ml   Net              800 ml       Laboratory  Recent Labs      01/17/19   2318   WBC  5.5   RBC  2.78*   HEMOGLOBIN  9.1*   HEMATOCRIT  26.9*   MCV  96.8   MCH  32.7   MCHC  33.8   RDW  49.9   PLATELETCT  148*   MPV  11.3     Recent Labs      01/17/19   2318   SODIUM  137   POTASSIUM  3.3*   CHLORIDE  102   CO2  21   GLUCOSE  92   BUN  6*   CREATININE  0.79   CALCIUM  6.7*                   Imaging  CT-HEAD W/O   Final Result         1. No CT evidence of acute infarct, hemorrhage or mass. No acute intracranial injury.   2. Subcutaneous/subgaleal hematoma of the right parieto-occipital scalp.   3. Mild global parenchymal atrophy.      MR-BRAIN-WITH & W/O    (Results Pending)        Assessment/Plan  * Alcohol withdrawal (HCC)- (present on admission)   Assessment & Plan    Alcohol withdrawals with hx of seizures, CIWA remains around 8-10, monitor closely  CIWA ordered  Librium taper   Replace lytes as needed  Tele for now   Seizure precautions     Hypokalemia- (present on admission)   Assessment & Plan    Replace and recheck      Traumatic hematoma of head- (present on admission)   Assessment & Plan    Due to fall from seizure   Cont pain control   Monitor for concussive symptoms-none observed  -hold DVT px  neurochecks        Hypocalcemia- (present on admission)   Assessment & Plan    Replace with TUMS, corrected for albumin still low, daily levels     Thrombocytopenia (HCC)- (present on admission)   Assessment & Plan    Likely due to alcohol abuse cont to monitor      Alcohol withdrawal seizure (HCC)- (present on admission)    Assessment & Plan    High likelihood of alcohol withdrawal seizures  On topamax will resume   Treat withdrawal, which appears minor, story from patient is that she has not really been drinking ETOH recently  -not on benzo's at home, so WD from that unlikely  -MRI brain w/w/o contrast to rule out more subtle structural issues     Chronic insomnia- (present on admission)   Assessment & Plan    Due to chronic alcohol abuse   Resume home trazadone       Transaminitis- (present on admission)   Assessment & Plan    Consistent with alcohol abuse   Con to monitor, trend daily levels     Anemia- (present on admission)   Assessment & Plan    Moderate, no evidence of bleeding   Recheck in am   Lab workup ordered          VTE prophylaxis: SCD's

## 2019-01-18 NOTE — ASSESSMENT & PLAN NOTE
High likelihood of alcohol withdrawal seizures  On topamax will resume   Treat withdrawal, which appears minor, story from patient is that she has not really been drinking ETOH recently  -not on benzo's at home, so WD from that unlikely  -MRI brain w/w/o contrast to rule out more subtle structural issues

## 2019-01-18 NOTE — PROGRESS NOTES
Bedside report received from JOSHUA Ignacio. Initial patient assessment performed, chart and eMAR reviewed. See relevant flowsheet for details. Patient updated on plan of care for the day, with all questions answered. Call light and personal belongings are within reach, and bed is in the lowest position.

## 2019-01-18 NOTE — PROGRESS NOTES
2 RN skin assessment completed with JOSHUA Tubbs. Skin issues are as follows:    - Abrasion on R cheek  - small scab on R ankle  - Various bruising on upper and lower extremities  - Elbows and heels dry, but intact and blanching appropriately  - Sacrum intact and color normal for ethnicity    Pt. Turns self, up with standby assistance.

## 2019-01-18 NOTE — THERAPY
"Occupational Therapy Evaluation completed.   Functional Status:  Spv w/bed mobility, spv w/grooming standing at sink, LB dressing, toilet txf and ambulation w/o AD. Tremors noted in BUE, however able to complete ADL's. Pt reports living alone but could have assist from family or BF if needed. Anticipate improvement w/symptoms in this setting, recommend additional supports to address substance abuse.   Plan of Care: Patient with no further skilled OT needs in the acute care setting at this time  Discharge Recommendations:  Equipment: No Equipment Needed. Post-acute therapy Anticipate that the patient will have no further occupational therapy needs after discharge from the hospital.       See \"Rehab Therapy-Acute\" Patient Summary Report for complete documentation.      38 yr old female admitted for GLF and seizure. Pt self reports recently stopping drinking. Pt is admitted for alcohol withdrawal and seizures. During today's session pt demonstrated ability to complete ADL's and mobility w/o A. 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 Anticipate no further OT needs, likely needs on going social and community supports to address substance abuse   "

## 2019-01-18 NOTE — ASSESSMENT & PLAN NOTE
Alcohol withdrawals with hx of seizures, CIWA remains around 8-10, monitor closely  CIWA ordered  Librium taper   Replace lytes as needed  Tele for now   Seizure precautions

## 2019-01-18 NOTE — DIETARY
Nutrition Services:  Brief Update    Poor PO and unplanned wt loss noted on Nutrition Admit Screen.   Pt is currently on a Regular diet and per chart pt PO %.   Ht: 160 cm, Wt: 56.7 kg, BMI 22.14.    Attempted to speak with pt at bedside, however pt was sleeping and did not rouse to name.  Of note, pt is on CIWA protocol.  Per chart review, pt wt on 1/11/18 was 61.2 kg (135 lbs).  Wt loss of 7% in one year is not significant.  Consult RD as needed. RD will re-screen weekly.      RD available PRN.

## 2019-01-19 ENCOUNTER — HOSPITAL ENCOUNTER (INPATIENT)
Facility: MEDICAL CENTER | Age: 39
LOS: 2 days | DRG: 896 | End: 2019-01-21
Attending: EMERGENCY MEDICINE | Admitting: HOSPITALIST
Payer: COMMERCIAL

## 2019-01-19 VITALS
OXYGEN SATURATION: 97 % | SYSTOLIC BLOOD PRESSURE: 123 MMHG | DIASTOLIC BLOOD PRESSURE: 83 MMHG | BODY MASS INDEX: 22.7 KG/M2 | WEIGHT: 128.09 LBS | RESPIRATION RATE: 16 BRPM | TEMPERATURE: 97.4 F | HEART RATE: 81 BPM | HEIGHT: 63 IN

## 2019-01-19 DIAGNOSIS — E83.42 HYPOMAGNESEMIA: ICD-10-CM

## 2019-01-19 DIAGNOSIS — F10.20 ALCOHOLISM (HCC): ICD-10-CM

## 2019-01-19 LAB
ALBUMIN SERPL BCP-MCNC: 3.2 G/DL (ref 3.2–4.9)
ALBUMIN/GLOB SERPL: 1.1 G/DL
ALP SERPL-CCNC: 122 U/L (ref 30–99)
ALT SERPL-CCNC: 24 U/L (ref 2–50)
ANION GAP SERPL CALC-SCNC: 11 MMOL/L (ref 0–11.9)
ANION GAP SERPL CALC-SCNC: 9 MMOL/L (ref 0–11.9)
AST SERPL-CCNC: 110 U/L (ref 12–45)
BASOPHILS # BLD AUTO: 1.5 % (ref 0–1.8)
BASOPHILS # BLD: 0.09 K/UL (ref 0–0.12)
BILIRUB SERPL-MCNC: 1.1 MG/DL (ref 0.1–1.5)
BUN SERPL-MCNC: 8 MG/DL (ref 8–22)
BUN SERPL-MCNC: 9 MG/DL (ref 8–22)
CALCIUM SERPL-MCNC: 7.5 MG/DL (ref 8.5–10.5)
CALCIUM SERPL-MCNC: 8.3 MG/DL (ref 8.5–10.5)
CHLORIDE SERPL-SCNC: 102 MMOL/L (ref 96–112)
CHLORIDE SERPL-SCNC: 105 MMOL/L (ref 96–112)
CO2 SERPL-SCNC: 21 MMOL/L (ref 20–33)
CO2 SERPL-SCNC: 22 MMOL/L (ref 20–33)
CREAT SERPL-MCNC: 0.99 MG/DL (ref 0.5–1.4)
CREAT SERPL-MCNC: 1.04 MG/DL (ref 0.5–1.4)
EKG IMPRESSION: NORMAL
EOSINOPHIL # BLD AUTO: 0.18 K/UL (ref 0–0.51)
EOSINOPHIL NFR BLD: 3 % (ref 0–6.9)
ERYTHROCYTE [DISTWIDTH] IN BLOOD BY AUTOMATED COUNT: 49.9 FL (ref 35.9–50)
FERRITIN SERPL-MCNC: 189.3 NG/ML (ref 10–291)
GLOBULIN SER CALC-MCNC: 2.8 G/DL (ref 1.9–3.5)
GLUCOSE SERPL-MCNC: 90 MG/DL (ref 65–99)
GLUCOSE SERPL-MCNC: 93 MG/DL (ref 65–99)
HCT VFR BLD AUTO: 26.3 % (ref 37–47)
HGB BLD-MCNC: 8.8 G/DL (ref 12–16)
IMM GRANULOCYTES # BLD AUTO: 0.02 K/UL (ref 0–0.11)
IMM GRANULOCYTES NFR BLD AUTO: 0.3 % (ref 0–0.9)
LYMPHOCYTES # BLD AUTO: 2.66 K/UL (ref 1–4.8)
LYMPHOCYTES NFR BLD: 44.6 % (ref 22–41)
MAGNESIUM SERPL-MCNC: 0.5 MG/DL (ref 1.5–2.5)
MAGNESIUM SERPL-MCNC: 2 MG/DL (ref 1.5–2.5)
MCH RBC QN AUTO: 32.6 PG (ref 27–33)
MCHC RBC AUTO-ENTMCNC: 33.5 G/DL (ref 33.6–35)
MCV RBC AUTO: 97.4 FL (ref 81.4–97.8)
MONOCYTES # BLD AUTO: 0.3 K/UL (ref 0–0.85)
MONOCYTES NFR BLD AUTO: 5 % (ref 0–13.4)
NEUTROPHILS # BLD AUTO: 2.71 K/UL (ref 2–7.15)
NEUTROPHILS NFR BLD: 45.6 % (ref 44–72)
NRBC # BLD AUTO: 0.03 K/UL
NRBC BLD-RTO: 0.5 /100 WBC
PHOSPHATE SERPL-MCNC: 2.4 MG/DL (ref 2.5–4.5)
PLATELET # BLD AUTO: 129 K/UL (ref 164–446)
PMV BLD AUTO: 12.2 FL (ref 9–12.9)
POTASSIUM SERPL-SCNC: 3.2 MMOL/L (ref 3.6–5.5)
POTASSIUM SERPL-SCNC: 3.5 MMOL/L (ref 3.6–5.5)
PROT SERPL-MCNC: 6 G/DL (ref 6–8.2)
RBC # BLD AUTO: 2.7 M/UL (ref 4.2–5.4)
SODIUM SERPL-SCNC: 135 MMOL/L (ref 135–145)
SODIUM SERPL-SCNC: 135 MMOL/L (ref 135–145)
WBC # BLD AUTO: 6 K/UL (ref 4.8–10.8)

## 2019-01-19 PROCEDURE — A9270 NON-COVERED ITEM OR SERVICE: HCPCS | Mod: EDC | Performed by: HOSPITALIST

## 2019-01-19 PROCEDURE — 700101 HCHG RX REV CODE 250: Performed by: HOSPITALIST

## 2019-01-19 PROCEDURE — HZ2ZZZZ DETOXIFICATION SERVICES FOR SUBSTANCE ABUSE TREATMENT: ICD-10-PCS | Performed by: EMERGENCY MEDICINE

## 2019-01-19 PROCEDURE — 700102 HCHG RX REV CODE 250 W/ 637 OVERRIDE(OP): Mod: EDC | Performed by: HOSPITALIST

## 2019-01-19 PROCEDURE — 99222 1ST HOSP IP/OBS MODERATE 55: CPT | Performed by: HOSPITALIST

## 2019-01-19 PROCEDURE — 700102 HCHG RX REV CODE 250 W/ 637 OVERRIDE(OP): Mod: EDC | Performed by: INTERNAL MEDICINE

## 2019-01-19 PROCEDURE — 80048 BASIC METABOLIC PNL TOTAL CA: CPT

## 2019-01-19 PROCEDURE — 83735 ASSAY OF MAGNESIUM: CPT | Mod: EDC

## 2019-01-19 PROCEDURE — 700111 HCHG RX REV CODE 636 W/ 250 OVERRIDE (IP): Mod: EDC | Performed by: HOSPITALIST

## 2019-01-19 PROCEDURE — 700102 HCHG RX REV CODE 250 W/ 637 OVERRIDE(OP): Performed by: HOSPITALIST

## 2019-01-19 PROCEDURE — 82728 ASSAY OF FERRITIN: CPT | Mod: EDC

## 2019-01-19 PROCEDURE — 85025 COMPLETE CBC W/AUTO DIFF WBC: CPT | Mod: EDC

## 2019-01-19 PROCEDURE — 93005 ELECTROCARDIOGRAM TRACING: CPT | Mod: EDC | Performed by: HOSPITALIST

## 2019-01-19 PROCEDURE — A9270 NON-COVERED ITEM OR SERVICE: HCPCS | Mod: EDC | Performed by: INTERNAL MEDICINE

## 2019-01-19 PROCEDURE — 80053 COMPREHEN METABOLIC PANEL: CPT | Mod: EDC

## 2019-01-19 PROCEDURE — 93010 ELECTROCARDIOGRAM REPORT: CPT | Performed by: INTERNAL MEDICINE

## 2019-01-19 PROCEDURE — 700105 HCHG RX REV CODE 258: Mod: EDC | Performed by: HOSPITALIST

## 2019-01-19 PROCEDURE — 700101 HCHG RX REV CODE 250: Mod: EDC | Performed by: HOSPITALIST

## 2019-01-19 PROCEDURE — 770020 HCHG ROOM/CARE - TELE (206)

## 2019-01-19 PROCEDURE — 36415 COLL VENOUS BLD VENIPUNCTURE: CPT | Mod: EDC

## 2019-01-19 PROCEDURE — A9270 NON-COVERED ITEM OR SERVICE: HCPCS | Performed by: HOSPITALIST

## 2019-01-19 PROCEDURE — 99285 EMERGENCY DEPT VISIT HI MDM: CPT

## 2019-01-19 PROCEDURE — 83735 ASSAY OF MAGNESIUM: CPT | Mod: 91

## 2019-01-19 PROCEDURE — 84100 ASSAY OF PHOSPHORUS: CPT | Mod: EDC

## 2019-01-19 RX ORDER — POLYETHYLENE GLYCOL 3350 17 G/17G
1 POWDER, FOR SOLUTION ORAL
Status: DISCONTINUED | OUTPATIENT
Start: 2019-01-19 | End: 2019-01-21 | Stop reason: HOSPADM

## 2019-01-19 RX ORDER — LORAZEPAM 2 MG/ML
2 INJECTION INTRAMUSCULAR
Status: DISCONTINUED | OUTPATIENT
Start: 2019-01-19 | End: 2019-01-21 | Stop reason: HOSPADM

## 2019-01-19 RX ORDER — POTASSIUM CHLORIDE 20 MEQ/1
40 TABLET, EXTENDED RELEASE ORAL DAILY
Qty: 20 TAB | Refills: 0 | OUTPATIENT
Start: 2019-01-19

## 2019-01-19 RX ORDER — LORAZEPAM 0.5 MG/1
0.5 TABLET ORAL EVERY 4 HOURS PRN
Status: DISCONTINUED | OUTPATIENT
Start: 2019-01-19 | End: 2019-01-21 | Stop reason: HOSPADM

## 2019-01-19 RX ORDER — AMOXICILLIN 250 MG
2 CAPSULE ORAL 2 TIMES DAILY
Status: DISCONTINUED | OUTPATIENT
Start: 2019-01-19 | End: 2019-01-21 | Stop reason: HOSPADM

## 2019-01-19 RX ORDER — TRAZODONE HYDROCHLORIDE 100 MG/1
100 TABLET ORAL
Status: DISCONTINUED | OUTPATIENT
Start: 2019-01-19 | End: 2019-01-21 | Stop reason: HOSPADM

## 2019-01-19 RX ORDER — LORAZEPAM 2 MG/ML
1.5 INJECTION INTRAMUSCULAR
Status: DISCONTINUED | OUTPATIENT
Start: 2019-01-19 | End: 2019-01-21 | Stop reason: HOSPADM

## 2019-01-19 RX ORDER — THIAMINE MONONITRATE (VIT B1) 100 MG
100 TABLET ORAL DAILY
Status: DISCONTINUED | OUTPATIENT
Start: 2019-01-20 | End: 2019-01-21 | Stop reason: HOSPADM

## 2019-01-19 RX ORDER — LORAZEPAM 2 MG/ML
0.5 INJECTION INTRAMUSCULAR EVERY 4 HOURS PRN
Status: DISCONTINUED | OUTPATIENT
Start: 2019-01-19 | End: 2019-01-21 | Stop reason: HOSPADM

## 2019-01-19 RX ORDER — LORAZEPAM 2 MG/ML
1 INJECTION INTRAMUSCULAR
Status: DISCONTINUED | OUTPATIENT
Start: 2019-01-19 | End: 2019-01-21 | Stop reason: HOSPADM

## 2019-01-19 RX ORDER — LORAZEPAM 1 MG/1
1 TABLET ORAL EVERY 4 HOURS PRN
Status: DISCONTINUED | OUTPATIENT
Start: 2019-01-19 | End: 2019-01-21 | Stop reason: HOSPADM

## 2019-01-19 RX ORDER — BISACODYL 10 MG
10 SUPPOSITORY, RECTAL RECTAL
Status: DISCONTINUED | OUTPATIENT
Start: 2019-01-19 | End: 2019-01-21 | Stop reason: HOSPADM

## 2019-01-19 RX ORDER — LANOLIN ALCOHOL/MO/W.PET/CERES
100 CREAM (GRAM) TOPICAL DAILY
Qty: 30 TAB | Refills: 1 | OUTPATIENT
Start: 2019-01-20

## 2019-01-19 RX ORDER — FLUOXETINE HYDROCHLORIDE 20 MG/1
20 CAPSULE ORAL DAILY
COMMUNITY
End: 2019-03-01

## 2019-01-19 RX ORDER — FOLIC ACID 1 MG/1
1 TABLET ORAL DAILY
Status: DISCONTINUED | OUTPATIENT
Start: 2019-01-20 | End: 2019-01-21 | Stop reason: HOSPADM

## 2019-01-19 RX ORDER — TOPIRAMATE 25 MG/1
100 TABLET ORAL 2 TIMES DAILY
Status: DISCONTINUED | OUTPATIENT
Start: 2019-01-19 | End: 2019-01-21 | Stop reason: HOSPADM

## 2019-01-19 RX ORDER — LORAZEPAM 1 MG/1
2 TABLET ORAL
Status: DISCONTINUED | OUTPATIENT
Start: 2019-01-19 | End: 2019-01-21 | Stop reason: HOSPADM

## 2019-01-19 RX ORDER — FLUOXETINE HYDROCHLORIDE 20 MG/1
20 CAPSULE ORAL DAILY
Status: DISCONTINUED | OUTPATIENT
Start: 2019-01-20 | End: 2019-01-21 | Stop reason: HOSPADM

## 2019-01-19 RX ORDER — LORAZEPAM 1 MG/1
3 TABLET ORAL
Status: DISCONTINUED | OUTPATIENT
Start: 2019-01-19 | End: 2019-01-21 | Stop reason: HOSPADM

## 2019-01-19 RX ORDER — POTASSIUM CHLORIDE 20 MEQ/1
40 TABLET, EXTENDED RELEASE ORAL 3 TIMES DAILY
Status: DISCONTINUED | OUTPATIENT
Start: 2019-01-19 | End: 2019-01-19 | Stop reason: HOSPADM

## 2019-01-19 RX ORDER — LORAZEPAM 1 MG/1
4 TABLET ORAL
Status: DISCONTINUED | OUTPATIENT
Start: 2019-01-19 | End: 2019-01-21 | Stop reason: HOSPADM

## 2019-01-19 RX ORDER — ACETAMINOPHEN 325 MG/1
650 TABLET ORAL EVERY 6 HOURS PRN
Status: DISCONTINUED | OUTPATIENT
Start: 2019-01-19 | End: 2019-01-21 | Stop reason: HOSPADM

## 2019-01-19 RX ADMIN — FOLIC ACID 1 MG: 1 TABLET ORAL at 05:47

## 2019-01-19 RX ADMIN — MAGNESIUM SULFATE HEPTAHYDRATE 6 G: 500 INJECTION, SOLUTION INTRAMUSCULAR; INTRAVENOUS at 02:45

## 2019-01-19 RX ADMIN — LORAZEPAM 1 MG: 2 INJECTION INTRAMUSCULAR; INTRAVENOUS at 03:58

## 2019-01-19 RX ADMIN — LISINOPRIL 20 MG: 20 TABLET ORAL at 05:48

## 2019-01-19 RX ADMIN — LORAZEPAM 1 MG: 2 INJECTION INTRAMUSCULAR; INTRAVENOUS at 07:37

## 2019-01-19 RX ADMIN — LORAZEPAM 4 MG: 1 TABLET ORAL at 21:47

## 2019-01-19 RX ADMIN — CHLORDIAZEPOXIDE HYDROCHLORIDE 25 MG: 25 CAPSULE ORAL at 03:43

## 2019-01-19 RX ADMIN — TOPIRAMATE 100 MG: 100 TABLET, FILM COATED ORAL at 18:00

## 2019-01-19 RX ADMIN — LORAZEPAM 2 MG: 1 TABLET ORAL at 23:23

## 2019-01-19 RX ADMIN — Medication 100 MG: at 05:46

## 2019-01-19 RX ADMIN — FLUOXETINE HYDROCHLORIDE 20 MG: 20 CAPSULE ORAL at 05:48

## 2019-01-19 RX ADMIN — LORAZEPAM 0.5 MG: 1 TABLET ORAL at 18:34

## 2019-01-19 RX ADMIN — ANTACID TABLETS 500 MG: 500 TABLET, CHEWABLE ORAL at 05:46

## 2019-01-19 RX ADMIN — LORAZEPAM 1 MG: 2 INJECTION INTRAMUSCULAR; INTRAVENOUS at 05:51

## 2019-01-19 RX ADMIN — TOPIRAMATE 100 MG: 100 TABLET, FILM COATED ORAL at 05:50

## 2019-01-19 RX ADMIN — TRAZODONE HYDROCHLORIDE 100 MG: 100 TABLET ORAL at 21:47

## 2019-01-19 RX ADMIN — STANDARDIZED SENNA CONCENTRATE AND DOCUSATE SODIUM 2 TABLET: 8.6; 5 TABLET, FILM COATED ORAL at 18:34

## 2019-01-19 RX ADMIN — HYDROCHLOROTHIAZIDE 25 MG: 25 TABLET ORAL at 05:48

## 2019-01-19 RX ADMIN — THERA TABS 1 TABLET: TAB at 05:46

## 2019-01-19 RX ADMIN — LORAZEPAM 1 MG: 2 INJECTION INTRAMUSCULAR; INTRAVENOUS at 01:54

## 2019-01-19 ASSESSMENT — LIFESTYLE VARIABLES
AGITATION: *
NAUSEA AND VOMITING: NO NAUSEA AND NO VOMITING
HEADACHE, FULLNESS IN HEAD: NOT PRESENT
NAUSEA AND VOMITING: NO NAUSEA AND NO VOMITING
ORIENTATION AND CLOUDING OF SENSORIUM: ORIENTED AND CAN DO SERIAL ADDITIONS
AUDITORY DISTURBANCES: NOT PRESENT
PAROXYSMAL SWEATS: BARELY PERCEPTIBLE SWEATING. PALMS MOIST
ANXIETY: MODERATELY ANXIOUS OR GUARDED, SO ANXIETY IS INFERRED
HEADACHE, FULLNESS IN HEAD: NOT PRESENT
ALCOHOL_USE: YES
VISUAL DISTURBANCES: NOT PRESENT
NAUSEA AND VOMITING: NO NAUSEA AND NO VOMITING
TOTAL SCORE: 14
ANXIETY: *
TOTAL SCORE: 4
TOTAL SCORE: 14
ORIENTATION AND CLOUDING OF SENSORIUM: ORIENTED AND CAN DO SERIAL ADDITIONS
NAUSEA AND VOMITING: NO NAUSEA AND NO VOMITING
PAROXYSMAL SWEATS: NO SWEAT VISIBLE
AUDITORY DISTURBANCES: NOT PRESENT
ORIENTATION AND CLOUDING OF SENSORIUM: ORIENTED AND CAN DO SERIAL ADDITIONS
ORIENTATION AND CLOUDING OF SENSORIUM: CANNOT DO SERIAL ADDITIONS OR IS UNCERTAIN ABOUT DATE
AGITATION: *
ANXIETY: *
PAROXYSMAL SWEATS: NO SWEAT VISIBLE
ANXIETY: *
TOTAL SCORE: 12
PAROXYSMAL SWEATS: BARELY PERCEPTIBLE SWEATING. PALMS MOIST
HEADACHE, FULLNESS IN HEAD: NOT PRESENT
EVER HAD A DRINK FIRST THING IN THE MORNING TO STEADY YOUR NERVES TO GET RID OF A HANGOVER: YES
HEADACHE, FULLNESS IN HEAD: NOT PRESENT
ORIENTATION AND CLOUDING OF SENSORIUM: DISORIENTED FOR PLACE AND / OR PERSON
TOTAL SCORE: 4
NAUSEA AND VOMITING: NO NAUSEA AND NO VOMITING
ANXIETY: *
TREMOR: *
ORIENTATION AND CLOUDING OF SENSORIUM: CANNOT DO SERIAL ADDITIONS OR IS UNCERTAIN ABOUT DATE
HEADACHE, FULLNESS IN HEAD: NOT PRESENT
AUDITORY DISTURBANCES: NOT PRESENT
TOTAL SCORE: 13
TREMOR: *
HAVE YOU EVER FELT YOU SHOULD CUT DOWN ON YOUR DRINKING: YES
VISUAL DISTURBANCES: NOT PRESENT
HOW MANY TIMES IN THE PAST YEAR HAVE YOU HAD 5 OR MORE DRINKS IN A DAY: 275
ORIENTATION AND CLOUDING OF SENSORIUM: DISORIENTED FOR PLACE AND / OR PERSON
AUDITORY DISTURBANCES: NOT PRESENT
ORIENTATION AND CLOUDING OF SENSORIUM: ORIENTED AND CAN DO SERIAL ADDITIONS
PAROXYSMAL SWEATS: BARELY PERCEPTIBLE SWEATING. PALMS MOIST
TOTAL SCORE: 4
ON A TYPICAL DAY WHEN YOU DRINK ALCOHOL HOW MANY DRINKS DO YOU HAVE: 6
HEADACHE, FULLNESS IN HEAD: NOT PRESENT
AUDITORY DISTURBANCES: NOT PRESENT
VISUAL DISTURBANCES: NOT PRESENT
HAVE PEOPLE ANNOYED YOU BY CRITICIZING YOUR DRINKING: YES
NAUSEA AND VOMITING: NO NAUSEA AND NO VOMITING
ANXIETY: MILDLY ANXIOUS
ORIENTATION AND CLOUDING OF SENSORIUM: DISORIENTED FOR PLACE AND / OR PERSON
PAROXYSMAL SWEATS: BARELY PERCEPTIBLE SWEATING. PALMS MOIST
PAROXYSMAL SWEATS: NO SWEAT VISIBLE
HEADACHE, FULLNESS IN HEAD: NOT PRESENT
PAROXYSMAL SWEATS: NO SWEAT VISIBLE
HEADACHE, FULLNESS IN HEAD: NOT PRESENT
TREMOR: *
TOTAL SCORE: 6
VISUAL DISTURBANCES: NOT PRESENT
TREMOR: *
EVER_SMOKED: NEVER
NAUSEA AND VOMITING: NO NAUSEA AND NO VOMITING
TOTAL SCORE: 11
EVER FELT BAD OR GUILTY ABOUT YOUR DRINKING: YES
VISUAL DISTURBANCES: NOT PRESENT
ANXIETY: MILDLY ANXIOUS
VISUAL DISTURBANCES: VERY MILD SENSITIVITY
HEADACHE, FULLNESS IN HEAD: NOT PRESENT
HEADACHE, FULLNESS IN HEAD: NOT PRESENT
AGITATION: SOMEWHAT MORE THAN NORMAL ACTIVITY
AVERAGE NUMBER OF DAYS PER WEEK YOU HAVE A DRINK CONTAINING ALCOHOL: 7
AUDITORY DISTURBANCES: NOT PRESENT
TOTAL SCORE: 4
TREMOR: *
TOTAL SCORE: 4
AGITATION: SOMEWHAT MORE THAN NORMAL ACTIVITY
NAUSEA AND VOMITING: NO NAUSEA AND NO VOMITING
ANXIETY: MILDLY ANXIOUS
AUDITORY DISTURBANCES: NOT PRESENT
AGITATION: SOMEWHAT MORE THAN NORMAL ACTIVITY
AUDITORY DISTURBANCES: NOT PRESENT
SUBSTANCE_ABUSE: 1
PAROXYSMAL SWEATS: NO SWEAT VISIBLE
TACTILE DISTURBANCES: VERY MILD ITCHING, PINS AND NEEDLES SENSATION, BURNING OR NUMBNESS
AUDITORY DISTURBANCES: NOT PRESENT
PAROXYSMAL SWEATS: NO SWEAT VISIBLE
ORIENTATION AND CLOUDING OF SENSORIUM: CANNOT DO SERIAL ADDITIONS OR IS UNCERTAIN ABOUT DATE
AGITATION: MODERATELY FIDGETY AND RESTLESS
CONSUMPTION TOTAL: POSITIVE
TREMOR: *
TREMOR: MODERATE TREMOR WITH ARMS EXTENDED
AGITATION: *
VISUAL DISTURBANCES: NOT PRESENT
VISUAL DISTURBANCES: NOT PRESENT
AUDITORY DISTURBANCES: NOT PRESENT
AGITATION: SOMEWHAT MORE THAN NORMAL ACTIVITY
TREMOR: *
TREMOR: *
AGITATION: MODERATELY FIDGETY AND RESTLESS
NAUSEA AND VOMITING: NO NAUSEA AND NO VOMITING
ANXIETY: MILDLY ANXIOUS
TOTAL SCORE: 19
AGITATION: MODERATELY FIDGETY AND RESTLESS
VISUAL DISTURBANCES: NOT PRESENT
NAUSEA AND VOMITING: NO NAUSEA AND NO VOMITING
ANXIETY: MODERATELY ANXIOUS OR GUARDED, SO ANXIETY IS INFERRED
VISUAL DISTURBANCES: NOT PRESENT
TOTAL SCORE: 4
TOTAL SCORE: MILD ITCHING, PINS AND NEEDLES SENSATION, BURNING OR NUMBNESS
TREMOR: MODERATE TREMOR WITH ARMS EXTENDED

## 2019-01-19 ASSESSMENT — PAIN SCALES - GENERAL
PAINLEVEL_OUTOF10: 0

## 2019-01-19 ASSESSMENT — ENCOUNTER SYMPTOMS
WEAKNESS: 1
SHORTNESS OF BREATH: 0
NERVOUS/ANXIOUS: 1
FEVER: 0
INSOMNIA: 1
NAUSEA: 1
VOMITING: 0
CHILLS: 0
HALLUCINATIONS: 0

## 2019-01-19 ASSESSMENT — COGNITIVE AND FUNCTIONAL STATUS - GENERAL
MOVING FROM LYING ON BACK TO SITTING ON SIDE OF FLAT BED: A LITTLE
MOBILITY SCORE: 20
SUGGESTED CMS G CODE MODIFIER DAILY ACTIVITY: CH
SUGGESTED CMS G CODE MODIFIER MOBILITY: CJ
DAILY ACTIVITIY SCORE: 24
CLIMB 3 TO 5 STEPS WITH RAILING: A LITTLE
STANDING UP FROM CHAIR USING ARMS: A LITTLE
WALKING IN HOSPITAL ROOM: A LITTLE

## 2019-01-19 ASSESSMENT — PATIENT HEALTH QUESTIONNAIRE - PHQ9
1. LITTLE INTEREST OR PLEASURE IN DOING THINGS: NOT AT ALL
2. FEELING DOWN, DEPRESSED, IRRITABLE, OR HOPELESS: NOT AT ALL
SUM OF ALL RESPONSES TO PHQ9 QUESTIONS 1 AND 2: 0

## 2019-01-19 NOTE — ED NOTES
Per the admit MD on T8 who had this patient denies direct admit at this time. Pt will come through the ER.
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
## 2019-01-19 NOTE — ED NOTES
Rounded on pt, discussed POC, updated on wait status, answered questions, addressed needs, ensured call light within reach. Provided emotional support for pt and mother. Pt requires frequent redirection, pt confused and pulling at lines/ monitors.

## 2019-01-19 NOTE — PROGRESS NOTES
Assumed care at 1`900, bedside report received from day shift RN. Pt on the monitor. Orders reviewed, call light within reach, and hourly rounding in place. POC addressed with patient, no additional questions at this time.

## 2019-01-19 NOTE — PROGRESS NOTES
Donna Morales patient has chosen to leave the hospital against medical advice. The attending physician has not discharged the patient. Patient is not a risk to himself or others. I have discussed with the patient the following:  Physician has not determined patient is ready for discharge, Risks and consequences of leaving the hospital too soon and Benefit of continued hospitalization.      Discharge against medical advice form has been Patient left abruptly - no chance to sign.        Attending physician has been notified.

## 2019-01-19 NOTE — CARE PLAN
Problem: Communication  Goal: The ability to communicate needs accurately and effectively will improve  Outcome: PROGRESSING AS EXPECTED    Intervention: [place] patient and significant other/support system to call light to alert staff of needs  Patient oriented to call light system and has been able to communicate needs accurately and effectively.      Problem: Safety  Goal: Will remain free from falls  Outcome: PROGRESSING AS EXPECTED    Intervention: Assess risk factors for falls  Patient has been assessed for fall risks and fall precautions have been put in place.
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
## 2019-01-19 NOTE — PROGRESS NOTES
Patient had a ground level fall during shift change, patient stated she was moving dinner tray bent down and fell forward on to the door. Patient stated she did not hit her head and that she was not hurt. Post fall vitals and assessment were completed and patient was placed on a bed alarm. Hospitalist has been paged to inform MD of fall.

## 2019-01-19 NOTE — ED NOTES
Notified by ER triage RN pt was just admitted here. Case management and NAM contacted for procedure of readmit.

## 2019-01-19 NOTE — DISCHARGE SUMMARY
Discharge Summary    CHIEF COMPLAINT ON ADMISSION  Chief Complaint   Patient presents with   • GLF       Reason for Admission  ems 50     Admission Date  1/17/2019    CODE STATUS  Full Code    HPI & HOSPITAL COURSE  This is a 38 y.o. female here with above medical issues. Patient appeared to have an ETOH withdrawal seizure and ETOH withdrawal. Patient was admitted to the telemetry and placed on CIWA protocol. She had no recurrent seizures and her MRI brain w/w/o contrast was normal as was her CT Head without contrast. Her electrolytes were indicative of heavy drinking and she was replaced aggressively. On hospital day #2  Patient wanted to leave AMA. She understood the risks of leaving early the hospital prematurely including the risk of death and recurrent seizures. I sent her new prescriptions for various electrolytes and she already has multiple medications for ETOH abuse at home from her outpatient detoxification center. She was strongly encouraged to return to the ER if her symptoms worsen and she understands this.        Therefore, she is discharged in guarded and stable condition against medcial advice.    Patient left AMA    Discharge Date  1/19/2019    FOLLOW UP ITEMS POST DISCHARGE  F/U with her PCP in 2-3 days and her outpatient detox center    DISCHARGE DIAGNOSES  Principal Problem:    Alcohol withdrawal (HCC) POA: Yes  Active Problems:    Hypokalemia POA: Yes    Anemia POA: Yes      Overview: Mild recheck am    Transaminitis POA: Yes      Overview: Mild, recheck am          Chronic insomnia POA: Yes    Alcohol withdrawal seizure (HCC) POA: Yes    Thrombocytopenia (HCC) POA: Yes    Hypocalcemia POA: Yes    Traumatic hematoma of head POA: Yes  Resolved Problems:    * No resolved hospital problems. *      FOLLOW UP  Future Appointments  Date Time Provider Department Center   1/21/2019 5:30 PM INTENSIVE OP PROGRAM [facility] [address]   1/22/2019 5:30 PM INTENSIVE OP PROGRAM [facility] [address]   1/24/2019 5:30  PM INTENSIVE OP PROGRAM [facility] [address]   3/1/2019 2:30 PM Sujey Dunbar M.D. [facility] [address]   3/5/2019 9:00 AM Enedina Hairston L.C.S.W. [facility] [address]     No follow-up provider specified.    MEDICATIONS ON DISCHARGE     Medication List      ASK your doctor about these medications      Instructions   acamprosate 333 MG tablet  Commonly known as:  CAMPRAL   Take 333 mg by mouth 3 times a day.  Dose:  333 mg     ADVIL PM PO   Take 1 Tab by mouth at bedtime as needed (sleep).  Dose:  1 Tab     B-12 1000 MCG Caps   Take 1 Cap by mouth every day.  Dose:  1 Cap     cefdinir 300 MG Caps  Commonly known as:  OMNICEF   Take 300 mg by mouth 2 times a day. 10 day course  Dose:  300 mg     disulfiram 250 MG Tabs  Commonly known as:  ANTABUSE   Take 250 mg by mouth every day.  Dose:  250 mg     FLUoxetine 20 MG Caps  Commonly known as:  PROZAC   Doctor's comments:  Buspirone and Hydroxyzine discontinued  Take 1 Cap by mouth every day.  Dose:  20 mg     ibuprofen 200 MG Tabs  Commonly known as:  MOTRIN   Take 600 mg by mouth every 6 hours as needed (TMJ).  Dose:  600 mg     lisinopril-hydrochlorothiazide 20-25 MG per tablet  Commonly known as:  PRINZIDE, ZESTORETIC   Take 1 Tab by mouth every day.  Dose:  1 Tab     LO LOESTRIN FE 1 MG-10 MCG / 10 MCG Tabs  Generic drug:  Norethin-Eth Estrad-Fe Biphas   Take 1 Tab by mouth every day. 4 months on 1 month off  Dose:  1 Tab     naltrexone 50 MG Tabs  Commonly known as:  DEPADE   Take 1 Tab by mouth every day.  Dose:  50 mg     topiramate 100 MG Tabs  Commonly known as:  TOPAMAX   Take 1 Tab by mouth 2 times a day.  Dose:  100 mg     traZODone 100 MG Tabs  Commonly known as:  DESYREL   Take 100-200 mg by mouth at bedtime as needed for Sleep.  Dose:  100-200 mg          SHE WAS GIVEN MAGNESIUM OXIDE, POTASSIUM CHLORIDE AND THIAMINE TABLETS.    Allergies  No Known Allergies    DIET  Orders Placed This Encounter   Procedures   • Diet Order Regular     Standing Status:    Standing     Number of Occurrences:   1     Order Specific Question:   Diet:     Answer:   Regular [1]       ACTIVITY  As tolerated.  Weight bearing as tolerated    CONSULTATIONS  NONE    PROCEDURES  MR-BRAIN-WITH & W/O   Final Result      1.  There is no evidence of hippocampal sclerosis.   2.  Mild cerebral atrophy.   3.  Nonspecific T2 hyperintensities in the supratentorial white matter likely representing chronic microvascular ischemic disease.   4.  Right parietal scalp hematoma.      CT-HEAD W/O   Final Result         1. No CT evidence of acute infarct, hemorrhage or mass. No acute intracranial injury.   2. Subcutaneous/subgaleal hematoma of the right parieto-occipital scalp.   3. Mild global parenchymal atrophy.            LABORATORY  Lab Results   Component Value Date    SODIUM 135 01/19/2019    POTASSIUM 3.2 (L) 01/19/2019    CHLORIDE 105 01/19/2019    CO2 21 01/19/2019    GLUCOSE 90 01/19/2019    BUN 8 01/19/2019    CREATININE 1.04 01/19/2019    CREATININE 0.9 06/16/2008        Lab Results   Component Value Date    WBC 6.0 01/19/2019    HEMOGLOBIN 8.8 (L) 01/19/2019    HEMATOCRIT 26.3 (L) 01/19/2019    PLATELETCT 129 (L) 01/19/2019        Total time of the discharge process exceeds 45 minutes.

## 2019-01-19 NOTE — ED PROVIDER NOTES
ED Provider Note    Scribed for Yosi Moralez M.D. by Yosi Moralez. 1/19/2019  12:14 PM    CHIEF COMPLAINT  Chief Complaint   Patient presents with   • Detox       HPI  Donna Morales is a 38 y.o. female who presents to the Emergency Room wanting to check back into the hospital after leaving from the eighth floor 2 hours ago, she where she was admitted for a possible alcohol withdrawal seizure, and severe electrolyte abnormalities secondary to alcohol abuse.  She is now accompanied by her mother.  She was observed to be sitting in the emergency department for the 2 hours that she spent off of the admitting floor.  She denies any drug or alcohol abuse while she was away from her floor AGAINST MEDICAL ADVICE.  When she decided checked by again, case management was alerted that she was checking back in.  She ambulated from the [facility] to her emergency department room with a stable, independent gait.  She has a 15-year or longer history of drinking as much as a liter of vodka per day.  She still reports that her last alcohol use was the Thursday prior to admission.  She responds slowly to questioning.  Her speech is clear, though she does seem slightly confused.  This significantly limits her history and review of systems.    Her records sure that she has seen by [facility] intermittently since 2014.  She has participated in intensive outpatient program therapy at [facility], and reportedly had been participating in Alcoholics Anonymous.  She has previous inpatient recovery treatments with subsequent failures.  She has a long medical history of problems and complaints and admission secondary to alcohol related organ damage.      REVIEW OF SYSTEMS  See HPI for further details.    PAST MEDICAL HISTORY   has a past medical history of Alcoholism (HCC); Anemia; Anesthesia; Anxiety; Dialysis (2008); Heart burn; Hypertension; Kidney failure (2008); Liver failure (HCC); Other  "specified symptom associated with female genital organs; Pancreatitis; and Wrist pain.    SOCIAL HISTORY  Social History     Social History Main Topics   • Smoking status: Never Smoker   • Smokeless tobacco: Never Used   • Alcohol use 36.0 oz/week     60 Shots of liquor per week      Comment: 750 ml Vodka drinks half bottle a day   • Drug use: No   • Sexual activity: Yes     Partners: Male     Birth control/ protection: Patch       SURGICAL HISTORY   has a past surgical history that includes lissa by laparoscopy (2006); laparoscopy (2000); and flexor tendon repair (7/18/2012).    CURRENT MEDICATIONS  Home Medications     Reviewed by Benjamin Morales R.N. (Registered Nurse) on 01/19/19 at 1241  Med List Status: <None>   Medication Last Dose Status   acamprosate (CAMPRAL) 333 MG tablet  Active   cefdinir (OMNICEF) 300 MG Cap  Active   Cyanocobalamin (B-12) 1000 MCG Cap  Active   disulfiram (ANTABUSE) 250 MG Tab  Active   fluoxetine (PROZAC) 20 MG Cap  Active   ibuprofen (MOTRIN) 200 MG Tab  Active   Ibuprofen-Diphenhydramine Cit (ADVIL PM PO)  Active   lisinopril-hydrochlorothiazide (PRINZIDE, ZESTORETIC) 20-25 MG per tablet  Active   LO LOESTRIN FE 1 MG-10 MCG / 10 MCG Tab  Active   naltrexone (DEPADE) 50 MG Tab  Active   topiramate (TOPAMAX) 100 MG Tab  Active   traZODone (DESYREL) 100 MG Tab  Active                ALLERGIES  No Known Allergies    PHYSICAL EXAM  VITAL SIGNS: /82   Pulse 82   Temp 36.7 °C (98.1 °F) (Temporal)   Resp 16   Ht 1.6 m (5' 3\")   Wt 58.1 kg (128 lb 1.4 oz)   LMP  (Approximate) Comment: \"about 4 months ago\"  SpO2 91%   BMI 22.69 kg/m²   Pulse ox interpretation: I interpret this pulse ox as normal.  Constitutional: Somewhat drowsy, slow to respond, in no apparent distress.  HENT: No signs of trauma, Bilateral external ears normal, Nose normal.   Eyes: Pupils are equal and reactive, Conjunctiva normal, Non-icteric.   Neck: Normal range of motion, No tenderness, Supple, No " stridor.    Cardiovascular: Normal peripheral perfusion  Thorax & Lungs: Unlabored respirations, equal chest expansion, no accessory muscle use  Abdomen: Non-distended  Skin:  No erythema, No rash.   Back: Normal alignment and ROM  Extremities: No gross deformity  Musculoskeletal: Good range of motion in all major joints.   Neurologic: Alert, Normal motor function, No focal deficits noted.   Psychiatric: Affect flat, somewhat slow to respond, not suicidal or homicidal, judgment fair, mood normal.      COURSE & MEDICAL DECISION MAKING  The patient's VS, Nurses notes reviewed. (See chart for details)    12:14 PM Patient seen and examined at bedside.  I had previously spoken to the admitting doctor upstairs, who agreed to return her to his service if the patient is willing to comply with treatment.     1:09 PM of spoken with Dr. Erik Gooden, the current emergency department on-call hospitalist, who will talk to Dr. Fritz who is upstairs and had previously been taking care of the patient.  Their is service agrees to admit.     The patient will return for new or worsening symptoms and is stable at the time of discharge.    The patient is referred to a primary physician for blood pressure management, diabetic screening, and for all other preventative health concerns.    DISPOSITION:  Patient will be admitted to the hospitalist service in stable condition.    FINAL IMPRESSION  1. Alcoholism (HCC) Chronic   2. Hypomagnesemia Active

## 2019-01-19 NOTE — PROGRESS NOTES
Magali from Lab called with critical result of 0.5 Mg at 0218. Critical lab result read back to Mar.   Dr. Macias notified of critical lab result at 0220.  Critical lab result read back by Dr. Macias.

## 2019-01-19 NOTE — ED TRIAGE NOTES
Wheeled to triage, pt just left AMA from T8 less than 2 hours ago, and has been sitting in the ER lobby, then decided to check back in.  Spoke with charge RN, who talked to CM and the NAM about getting her back up to her room on tele.  Pt waiting in the lobby with her mother.

## 2019-01-19 NOTE — ED NOTES
Pt ambulatory to ED room 36 from WR, mother remains at bedside. Typing RN assumed care of pt at this time.

## 2019-01-19 NOTE — PROGRESS NOTES
Patient insistent she is leaving, educated her on the right to leave AMA. She stated she would let me know. Will continue to monitor.

## 2019-01-19 NOTE — ED NOTES
Med rec complete per pt at bedside with list   NKDA  Pt states she lost some of her medications at the beginning of the month   One of those was Cefdinir 300mg twice daily X 10 days   Pt claims she restarted it but can't confirm what day she restarted it

## 2019-01-20 LAB
ANION GAP SERPL CALC-SCNC: 11 MMOL/L (ref 0–11.9)
BUN SERPL-MCNC: 10 MG/DL (ref 8–22)
CALCIUM SERPL-MCNC: 7.8 MG/DL (ref 8.5–10.5)
CHLORIDE SERPL-SCNC: 108 MMOL/L (ref 96–112)
CO2 SERPL-SCNC: 19 MMOL/L (ref 20–33)
CREAT SERPL-MCNC: 0.96 MG/DL (ref 0.5–1.4)
EKG IMPRESSION: NORMAL
ERYTHROCYTE [DISTWIDTH] IN BLOOD BY AUTOMATED COUNT: 52.1 FL (ref 35.9–50)
GLUCOSE SERPL-MCNC: 93 MG/DL (ref 65–99)
HCT VFR BLD AUTO: 25.5 % (ref 37–47)
HGB BLD-MCNC: 8.4 G/DL (ref 12–16)
MAGNESIUM SERPL-MCNC: 1.8 MG/DL (ref 1.5–2.5)
MCH RBC QN AUTO: 32.8 PG (ref 27–33)
MCHC RBC AUTO-ENTMCNC: 32.9 G/DL (ref 33.6–35)
MCV RBC AUTO: 99.6 FL (ref 81.4–97.8)
PLATELET # BLD AUTO: 125 K/UL (ref 164–446)
PMV BLD AUTO: 11.9 FL (ref 9–12.9)
POTASSIUM SERPL-SCNC: 3.2 MMOL/L (ref 3.6–5.5)
RBC # BLD AUTO: 2.56 M/UL (ref 4.2–5.4)
SODIUM SERPL-SCNC: 138 MMOL/L (ref 135–145)
WBC # BLD AUTO: 4.6 K/UL (ref 4.8–10.8)

## 2019-01-20 PROCEDURE — 83735 ASSAY OF MAGNESIUM: CPT

## 2019-01-20 PROCEDURE — 700102 HCHG RX REV CODE 250 W/ 637 OVERRIDE(OP): Performed by: HOSPITALIST

## 2019-01-20 PROCEDURE — 770020 HCHG ROOM/CARE - TELE (206)

## 2019-01-20 PROCEDURE — A9270 NON-COVERED ITEM OR SERVICE: HCPCS | Performed by: HOSPITALIST

## 2019-01-20 PROCEDURE — 80048 BASIC METABOLIC PNL TOTAL CA: CPT

## 2019-01-20 PROCEDURE — 99233 SBSQ HOSP IP/OBS HIGH 50: CPT | Performed by: INTERNAL MEDICINE

## 2019-01-20 PROCEDURE — 36415 COLL VENOUS BLD VENIPUNCTURE: CPT

## 2019-01-20 PROCEDURE — 700101 HCHG RX REV CODE 250: Performed by: HOSPITALIST

## 2019-01-20 PROCEDURE — 93010 ELECTROCARDIOGRAM REPORT: CPT | Performed by: INTERNAL MEDICINE

## 2019-01-20 PROCEDURE — A9270 NON-COVERED ITEM OR SERVICE: HCPCS | Performed by: INTERNAL MEDICINE

## 2019-01-20 PROCEDURE — 700102 HCHG RX REV CODE 250 W/ 637 OVERRIDE(OP): Performed by: INTERNAL MEDICINE

## 2019-01-20 PROCEDURE — 93005 ELECTROCARDIOGRAM TRACING: CPT | Performed by: HOSPITALIST

## 2019-01-20 PROCEDURE — 85027 COMPLETE CBC AUTOMATED: CPT

## 2019-01-20 PROCEDURE — 700111 HCHG RX REV CODE 636 W/ 250 OVERRIDE (IP): Performed by: HOSPITALIST

## 2019-01-20 RX ORDER — POTASSIUM CHLORIDE 20 MEQ/1
40 TABLET, EXTENDED RELEASE ORAL 2 TIMES DAILY
Status: COMPLETED | OUTPATIENT
Start: 2019-01-20 | End: 2019-01-20

## 2019-01-20 RX ADMIN — LORAZEPAM 2 MG: 1 TABLET ORAL at 10:37

## 2019-01-20 RX ADMIN — FOLIC ACID 1 MG: 1 TABLET ORAL at 05:32

## 2019-01-20 RX ADMIN — LORAZEPAM 0.5 MG: 1 TABLET ORAL at 05:32

## 2019-01-20 RX ADMIN — LORAZEPAM 1 MG: 1 TABLET ORAL at 18:04

## 2019-01-20 RX ADMIN — POTASSIUM CHLORIDE 40 MEQ: 1500 TABLET, EXTENDED RELEASE ORAL at 16:57

## 2019-01-20 RX ADMIN — POTASSIUM CHLORIDE 40 MEQ: 1500 TABLET, EXTENDED RELEASE ORAL at 08:33

## 2019-01-20 RX ADMIN — ENOXAPARIN SODIUM 40 MG: 100 INJECTION SUBCUTANEOUS at 05:32

## 2019-01-20 RX ADMIN — LORAZEPAM 1 MG: 2 INJECTION INTRAMUSCULAR at 09:37

## 2019-01-20 RX ADMIN — LORAZEPAM 1 MG: 2 INJECTION INTRAMUSCULAR at 08:33

## 2019-01-20 RX ADMIN — TRAZODONE HYDROCHLORIDE 100 MG: 100 TABLET ORAL at 22:29

## 2019-01-20 RX ADMIN — TOPIRAMATE 100 MG: 100 TABLET, FILM COATED ORAL at 05:38

## 2019-01-20 RX ADMIN — Medication 100 MG: at 05:32

## 2019-01-20 RX ADMIN — THERA TABS 1 TABLET: TAB at 05:32

## 2019-01-20 RX ADMIN — FLUOXETINE HYDROCHLORIDE 20 MG: 20 CAPSULE ORAL at 05:32

## 2019-01-20 RX ADMIN — LORAZEPAM 2 MG: 1 TABLET ORAL at 12:35

## 2019-01-20 RX ADMIN — MAGNESIUM GLUCONATE 500 MG ORAL TABLET 400 MG: 500 TABLET ORAL at 08:32

## 2019-01-20 RX ADMIN — TOPIRAMATE 100 MG: 100 TABLET, FILM COATED ORAL at 16:57

## 2019-01-20 ASSESSMENT — LIFESTYLE VARIABLES
ANXIETY: NO ANXIETY (AT EASE)
TOTAL SCORE: 4
HEADACHE, FULLNESS IN HEAD: NOT PRESENT
PAROXYSMAL SWEATS: NO SWEAT VISIBLE
AUDITORY DISTURBANCES: NOT PRESENT
VISUAL DISTURBANCES: NOT PRESENT
AGITATION: NORMAL ACTIVITY
TREMOR: *
ANXIETY: MILDLY ANXIOUS
ORIENTATION AND CLOUDING OF SENSORIUM: DISORIENTED FOR PLACE AND / OR PERSON
ORIENTATION AND CLOUDING OF SENSORIUM: DISORIENTED FOR PLACE AND / OR PERSON
VISUAL DISTURBANCES: NOT PRESENT
NAUSEA AND VOMITING: NO NAUSEA AND NO VOMITING
ORIENTATION AND CLOUDING OF SENSORIUM: DISORIENTED FOR PLACE AND / OR PERSON
TOTAL SCORE: 8
TREMOR: NO TREMOR
AGITATION: NORMAL ACTIVITY
AUDITORY DISTURBANCES: NOT PRESENT
TOTAL SCORE: 4
VISUAL DISTURBANCES: NOT PRESENT
TOTAL SCORE: 14
ANXIETY: NO ANXIETY (AT EASE)
TOTAL SCORE: 15
HEADACHE, FULLNESS IN HEAD: VERY MILD
PAROXYSMAL SWEATS: NO SWEAT VISIBLE
NAUSEA AND VOMITING: NO NAUSEA AND NO VOMITING
PAROXYSMAL SWEATS: BARELY PERCEPTIBLE SWEATING. PALMS MOIST
AGITATION: NORMAL ACTIVITY
AGITATION: NORMAL ACTIVITY
PAROXYSMAL SWEATS: NO SWEAT VISIBLE
AUDITORY DISTURBANCES: NOT PRESENT
ANXIETY: *
NAUSEA AND VOMITING: NO NAUSEA AND NO VOMITING
TREMOR: NO TREMOR
VISUAL DISTURBANCES: NOT PRESENT
VISUAL DISTURBANCES: NOT PRESENT
NAUSEA AND VOMITING: NO NAUSEA AND NO VOMITING
TREMOR: *
PAROXYSMAL SWEATS: BARELY PERCEPTIBLE SWEATING. PALMS MOIST
NAUSEA AND VOMITING: NO NAUSEA AND NO VOMITING
AGITATION: MODERATELY FIDGETY AND RESTLESS
ORIENTATION AND CLOUDING OF SENSORIUM: DISORIENTED FOR PLACE AND / OR PERSON
ANXIETY: *
PAROXYSMAL SWEATS: NO SWEAT VISIBLE
AGITATION: *
ANXIETY: *
AUDITORY DISTURBANCES: NOT PRESENT
ORIENTATION AND CLOUDING OF SENSORIUM: DISORIENTED FOR PLACE AND / OR PERSON
PAROXYSMAL SWEATS: NO SWEAT VISIBLE
PAROXYSMAL SWEATS: BARELY PERCEPTIBLE SWEATING. PALMS MOIST
TOTAL SCORE: 4
ANXIETY: *
AGITATION: *
TOTAL SCORE: 15
NAUSEA AND VOMITING: NO NAUSEA AND NO VOMITING
ANXIETY: MILDLY ANXIOUS
ORIENTATION AND CLOUDING OF SENSORIUM: DISORIENTED FOR PLACE AND / OR PERSON
HEADACHE, FULLNESS IN HEAD: NOT PRESENT
HEADACHE, FULLNESS IN HEAD: NOT PRESENT
ANXIETY: MILDLY ANXIOUS
AUDITORY DISTURBANCES: NOT PRESENT
NAUSEA AND VOMITING: MILD NAUSEA WITH NO VOMITING
ORIENTATION AND CLOUDING OF SENSORIUM: DISORIENTED FOR PLACE AND / OR PERSON
TREMOR: *
AUDITORY DISTURBANCES: NOT PRESENT
NAUSEA AND VOMITING: NO NAUSEA AND NO VOMITING
TREMOR: NO TREMOR
VISUAL DISTURBANCES: NOT PRESENT
AUDITORY DISTURBANCES: NOT PRESENT
PAROXYSMAL SWEATS: BARELY PERCEPTIBLE SWEATING. PALMS MOIST
NAUSEA AND VOMITING: NO NAUSEA AND NO VOMITING
HEADACHE, FULLNESS IN HEAD: NOT PRESENT
TOTAL SCORE: 12
AUDITORY DISTURBANCES: NOT PRESENT
HEADACHE, FULLNESS IN HEAD: NOT PRESENT
VISUAL DISTURBANCES: NOT PRESENT
TREMOR: *
PAROXYSMAL SWEATS: BARELY PERCEPTIBLE SWEATING. PALMS MOIST
VISUAL DISTURBANCES: NOT PRESENT
TREMOR: TREMOR NOT VISIBLE BUT CAN BE FELT, FINGERTIP TO FINGERTIP
AUDITORY DISTURBANCES: NOT PRESENT
ANXIETY: NO ANXIETY (AT EASE)
TREMOR: *
AGITATION: *
HEADACHE, FULLNESS IN HEAD: NOT PRESENT
AGITATION: SOMEWHAT MORE THAN NORMAL ACTIVITY
HEADACHE, FULLNESS IN HEAD: NOT PRESENT
ORIENTATION AND CLOUDING OF SENSORIUM: ORIENTED AND CAN DO SERIAL ADDITIONS
TOTAL SCORE: 6
HEADACHE, FULLNESS IN HEAD: NOT PRESENT
NAUSEA AND VOMITING: NO NAUSEA AND NO VOMITING
ORIENTATION AND CLOUDING OF SENSORIUM: DISORIENTED FOR PLACE AND / OR PERSON
TOTAL SCORE: 4
AUDITORY DISTURBANCES: NOT PRESENT
AGITATION: NORMAL ACTIVITY
HEADACHE, FULLNESS IN HEAD: NOT PRESENT
TREMOR: TREMOR NOT VISIBLE BUT CAN BE FELT, FINGERTIP TO FINGERTIP
ORIENTATION AND CLOUDING OF SENSORIUM: DISORIENTED FOR PLACE AND / OR PERSON

## 2019-01-20 ASSESSMENT — ENCOUNTER SYMPTOMS
TREMORS: 0
NAUSEA: 0
MYALGIAS: 0
SHORTNESS OF BREATH: 0
HALLUCINATIONS: 0
ABDOMINAL PAIN: 0
DIZZINESS: 0

## 2019-01-20 ASSESSMENT — PAIN SCALES - GENERAL
PAINLEVEL_OUTOF10: 0

## 2019-01-20 NOTE — CARE PLAN
Problem: Communication  Goal: The ability to communicate needs accurately and effectively will improve    Intervention: Reorient patient to environment as needed   01/20/19 0313   OTHER   Oriented to: All of the Following : Location of Bathroom, Visiting Policy, Unit Routine, Call Light and Bedside Controls, Bedside Rail Policy, Smoking Policy, Rights and Responsibilities, Bedside Report, and Patient Education Notebook;Location of bathroom;Visiting Policy;Unit Routine;Call Light & Bedside Controls;Bedside Rail Policy;Smoking Policy;Patient Rights and Responsibilities   Patient needs frequent reorientation as she is disoriented to place.      Problem: Safety  Goal: Will remain free from injury  Outcome: PROGRESSING AS EXPECTED  Patient safety precautions in place. 2 side rails, bed in lowest locked position, non-slip socks on, call light in reach- educated on when/how to use call light, bed alarm on.

## 2019-01-20 NOTE — PROGRESS NOTES
Assumed care of Pt from ED Pt arrived to the unit via gurney escorted by ACLS RN on Zoll. Vital signs stab;e. Assessment complete, A&Ox 3 (disoriented to place0, no signs of distress noted at this time. Tele monitor in place, monitor room notified  SR 90's on monitor. Pt denies pain. Pt is oriented to the unit, call light, and phone system. Fall precautions in place. Call light in reach. Bed is locked in lowest position. Pt is educated regarding fall precautions and the importance for calling staff for assistance. Pt denies any additional needs at this time. Will continue to monitor.

## 2019-01-20 NOTE — PROGRESS NOTES
"Received bedside report from RN, pt care assumed, VSS, pt assessment complete. Pt AAOx3, pt c/o doesn't pain at this time. No signs of acute distress noted at this time. POC discussed with pt. Pt states she wants \"to go to [facility] today and then I'll come back.\"   Pt denies any additional needs at this time. Bed in lowest position, strip bed alarm on, lap belt on but pt keeps taking it off, pt educated on fall risk, call light within reach, hourly rounding initiated. Education/reinforcement needed.  "

## 2019-01-20 NOTE — H&P
Hospital Medicine History & Physical Note    Date of Service  1/19/2019    Primary Care Physician  David Coughlin M.D.    Code Status  full    Chief Complaint  Alcohol detox    History of Presenting Illness  38 y.o. female who presented 1/19/2019 with detox. Ms. Morales has a very significant history of alcohol dependence going back at least 12 years presented to emergency room on 1/17/2019 with what appear to be in alcohol withdrawal seizures she just stopped drinking alcohol 2 days prior.  She is brought to the emergency room with dehydration and multiple operatory abnormalities with her electrolytes which were replaced.  This morning, she elected to leave the hospital AGAINST MEDICAL ADVICE.  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 Morales to check back in.  I spoke with her prior attending Dr. Garcia as he had seen her earlier this morning and dictated the discharge summary.  Given her on measurably low magnesium level as well as ongoing low potassium and her extreme high risk of going back to alcohol, he recommends that she is readmitted.  In the emergency room, I will met with patient and her mother and patient is very adamant that she will stay for further treatment.  Labs were ordered from the emergency room.    Review of Systems  Review of Systems   Constitutional: Positive for malaise/fatigue. Negative for chills and fever.   Respiratory: Negative for shortness of breath.    Gastrointestinal: Positive for nausea. Negative for vomiting.   Neurological: Positive for weakness.   Psychiatric/Behavioral: Positive for substance abuse. Negative for hallucinations. The patient is nervous/anxious and has insomnia.    All other systems reviewed and are negative.      Past Medical History   has a past medical history of Alcoholism (HCC); Anemia; Anesthesia; Anxiety; Dialysis (2008); Heart burn;  Hypertension; Kidney failure (2008); Liver failure (HCC); Other specified symptom associated with female genital organs; Pancreatitis; and Wrist pain.    Surgical History   has a past surgical history that includes lissa by laparoscopy (2006); laparoscopy (2000); and flexor tendon repair (7/18/2012).     Family History  family history includes Alcohol abuse in her father, paternal aunt, paternal grandfather, and paternal grandmother; Diabetes in her unknown relative; Drug abuse in her father and paternal aunt; Hypertension in her unknown relative; Psychiatry in her paternal grandmother; Stroke in her unknown relative.     Social History   reports that she has never smoked. She has never used smokeless tobacco.  She drinks vodka and wine though was not specific about how much she reports that she does not use drugs.  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 Taking?   Cyanocobalamin (B-12) 1000 MCG Cap 1/1/2019 at am Patient No No   Sig: Take 1 Cap by mouth every day.   FLUoxetine (PROZAC) 20 MG Cap 1/16/2019 at am Patient Yes Yes   Sig: Take 20 mg by mouth every day.   Ibuprofen-Diphenhydramine Cit (ADVIL PM PO) prn at prn Patient Yes No   Sig: Take 1 Tab by mouth at bedtime as needed (sleep).   LO LOESTRIN FE 1 MG-10 MCG / 10 MCG Tab 1/16/2019 at am Patient Yes No   Sig: Take 1 Tab by mouth every day. 4 months on 1 month off   acamprosate (CAMPRAL) 333 MG tablet 1/1/2019 at am Patient Yes No   Sig: Take 333 mg by mouth 3 times a day.   cefdinir (OMNICEF) 300 MG Cap 1/1/2019 at am Patient Yes No   Sig: Take 300 mg by mouth 2 times a day. 10 day course   disulfiram (ANTABUSE) 250 MG Tab 1/1/2019 at am Patient Yes No   Sig: Take 250 mg by mouth every day.   ibuprofen (MOTRIN) 200 MG Tab prn at prn Patient Yes No   Sig: Take 600 mg by mouth every 6 hours as needed (TMJ).    lisinopril-hydrochlorothiazide (PRINZIDE, ZESTORETIC) 20-25 MG per tablet 1/16/2019 at am Patient Yes No   Sig: Take 1 Tab by mouth every day.   naltrexone (DEPADE) 50 MG Tab 1/16/2019 at am Patient No No   Sig: Take 1 Tab by mouth every day.   topiramate (TOPAMAX) 100 MG Tab 1/16/2019 at am Patient No No   Sig: Take 1 Tab by mouth 2 times a day.   traZODone (DESYREL) 100 MG Tab prn at prn Patient Yes No   Sig: Take 100-200 mg by mouth at bedtime as needed for Sleep.      Facility-Administered Medications: None       Physical Exam  Temp:  [36.7 °C (98.1 °F)] 36.7 °C (98.1 °F)  Pulse:  [63-95] 87  Resp:  [14-16] 16  BP: (106-116)/(80-82) 116/82  SpO2:  [91 %-100 %] 100 %    Physical Exam   Constitutional:   She is somewhat distressed   HENT:   Head: Normocephalic.   She has a bruise with abrasion on the right face   Neck: Normal range of motion. Neck supple.   Cardiovascular:   Sinus rhythm without a murmur   Pulmonary/Chest: Effort normal and breath sounds normal. No respiratory distress. She has no wheezes.   Abdominal: Soft. She exhibits no distension. There is no tenderness.   Musculoskeletal: She exhibits no edema or tenderness.   Neurological:   Slight tremor when she puts her hands out   Skin: Skin is warm and dry. She is not diaphoretic.   Bruise on the left forearm   Psychiatric:   She is alert and oriented x4 with flat affect   Nursing note and vitals reviewed.      Laboratory:  Recent Labs      01/17/19 2318 01/19/19 0050   WBC  5.5  6.0   RBC  2.78*  2.70*   HEMOGLOBIN  9.1*  8.8*   HEMATOCRIT  26.9*  26.3*   MCV  96.8  97.4   MCH  32.7  32.6   MCHC  33.8  33.5*   RDW  49.9  49.9   PLATELETCT  148*  129*   MPV  11.3  12.2     Recent Labs      01/17/19 2318  01/19/19   0050  01/19/19   1400   SODIUM  137  135  135   POTASSIUM  3.3*  3.2*  3.5*   CHLORIDE  102  105  102   CO2  21  21  22   GLUCOSE  92  90  93   BUN  6*  8  9   CREATININE  0.79  1.04  0.99   CALCIUM  6.7*  7.5*  8.3*     Recent Labs       01/17/19   2318  01/19/19   0050  01/19/19   1400   ALTSGPT  31  24   --    ASTSGOT  168*  110*   --    ALKPHOSPHAT  125*  122*   --    TBILIRUBIN  1.4  1.1   --    GLUCOSE  92  90  93                 No results for input(s): TROPONINI in the last 72 hours.    Urinalysis:    Recent Labs      01/18/19   0730   SPECGRAVITY  1.004   GLUCOSEUR  Negative   KETONES  Negative   NITRITE  Negative   LEUKESTERAS  Negative        Imaging:  No orders to display         Assessment/Plan:  I anticipate this patient will require at least two midnights for appropriate medical management, necessitating inpatient admission.    * Alcohol withdrawal (HCC)- (present on admission)   Assessment & Plan    This is severe she is required extensive benzodiazepines  She will receive ongoing vitamin and mineral replacement   will be consulted for either inpatient or outpatient alcohol detox       Hypokalemia- (present on admission)   Assessment & Plan    Her potassium is been persistently low and is being replaced intravenously with a recheck in the morning     Hypomagnesemia- (present on admission)   Assessment & Plan    Magnesium was on measurably low and 6 g of IV magnesium have been given today with repeat now up to 2  Magnesium level will be rechecked in the morning     Dehydration- (present on admission)   Assessment & Plan    IV fluids have been ordered     Alcohol withdrawal seizure (HCC)- (present on admission)   Assessment & Plan    She has had no further seizure activity since admission but will be monitored     Transaminitis- (present on admission)   Assessment & Plan    Secondary to alcohol     Anemia- (present on admission)   Assessment & Plan    Hemoglobin is quite low at 8.8  This may be bone marrow suppression from alcohol toxicity  MCV is normal  Her hemoglobin dropped substantially from admission though this is likely delusional given the aggressive IV fluid she is received         VTE prophylaxis:  lovenox

## 2019-01-20 NOTE — PROGRESS NOTES
2 RN Skin Check with [place] RN    Generalized skin is intact with no pressure related injuries at this time    Multiple bruises noted to L arm and scattered on BLE  Bilateral elbows are pink and blanching  Bilateral heels are dry, pink and blanching    Will continue to monitor

## 2019-01-20 NOTE — PROGRESS NOTES
Hospital Medicine Daily Progress Note    Date of Service  1/20/2019    Chief Complaint  38 y.o. female admitted 1/19/2019 with alcohol withdrawal.    Hospital Course    PMH alcohol abuse, HTN, ESRD on HD who was recently admitted for alcohol withdrawal then left AMA. She was readmitted for alcohol withdrawal and electrolyte abnormalities.        Interval Problem Update  CIWA 4-13 since admission. She feels like her withdrawal is improving  Pancytopenia  HypoK, hypoMg, replete    Consultants/Specialty  None    Code Status  Full    Disposition  Lives in [place]  Family involved in support, plans for outpatient alcohol rehab    Review of Systems  Review of Systems   Constitutional: Negative for malaise/fatigue.   HENT: Negative for congestion.    Respiratory: Negative for shortness of breath.    Cardiovascular: Negative for chest pain.   Gastrointestinal: Negative for abdominal pain and nausea.   Musculoskeletal: Negative for myalgias.   Skin: Negative for itching.   Neurological: Negative for dizziness and tremors.   Psychiatric/Behavioral: Negative for hallucinations.        Physical Exam  Temp:  [36.2 °C (97.1 °F)-36.8 °C (98.2 °F)] 36.2 °C (97.1 °F)  Pulse:  [63-95] 87  Resp:  [15-20] 20  BP: (109-129)/(64-95) 129/95  SpO2:  [91 %-100 %] 99 %    Physical Exam   Constitutional: She appears well-developed and well-nourished.   HENT:   Head: Normocephalic.   Mouth/Throat: Oropharynx is clear and moist.   Eyes: Conjunctivae are normal. Right eye exhibits no discharge. Left eye exhibits no discharge.   Cardiovascular: Normal rate and regular rhythm.    Pulmonary/Chest: Effort normal. She has no wheezes. She has no rales.   Abdominal: There is no tenderness. There is no rebound and no guarding.   Musculoskeletal: She exhibits no edema.   No tremors   Neurological: She is alert.   Oriented to self, place, situation   Skin: Skin is warm and dry.   Nursing note and vitals reviewed.      Fluids    Intake/Output Summary (Last 24  hours) at 01/20/19 1158  Last data filed at 01/19/19 2110   Gross per 24 hour   Intake                0 ml   Output              350 ml   Net             -350 ml       Laboratory  Recent Labs      01/17/19   2318  01/19/19   0050  01/20/19   0226   WBC  5.5  6.0  4.6*   RBC  2.78*  2.70*  2.56*   HEMOGLOBIN  9.1*  8.8*  8.4*   HEMATOCRIT  26.9*  26.3*  25.5*   MCV  96.8  97.4  99.6*   MCH  32.7  32.6  32.8   MCHC  33.8  33.5*  32.9*   RDW  49.9  49.9  52.1*   PLATELETCT  148*  129*  125*   MPV  11.3  12.2  11.9     Recent Labs      01/19/19   0050  01/19/19   1400  01/20/19 0226   SODIUM  135  135  138   POTASSIUM  3.2*  3.5*  3.2*   CHLORIDE  105  102  108   CO2  21  22  19*   GLUCOSE  90  93  93   BUN  8  9  10   CREATININE  1.04  0.99  0.96   CALCIUM  7.5*  8.3*  7.8*                   Imaging  No orders to display        Assessment/Plan  * Alcohol withdrawal (HCC)- (present on admission)   Assessment & Plan    CIWA  Folate/thiamine/MVI       Hypokalemia- (present on admission)   Assessment & Plan    Continue repletion and monitor  Poor po intake     Hypomagnesemia- (present on admission)   Assessment & Plan    Replete and monitor  Poor po intake     Dehydration- (present on admission)   Assessment & Plan    Received IVF     Alcohol withdrawal seizure (HCC)- (present on admission)   Assessment & Plan    She has had no further seizure activity since admission but will be monitored     Transaminitis- (present on admission)   Assessment & Plan    Mild. Secondary to alcohol  Trend     Anemia- (present on admission)   Assessment & Plan    Normocytic anemia  This may be bone marrow suppression from alcohol toxicity  Ferritin, folate, vit B12 were normal  Monitor for bleeding          VTE prophylaxis: lovenox

## 2019-01-20 NOTE — ED NOTES
Pt lying on cart at this time with mother at bedside. Pt remains on ccm/pulse ox. Pt in nad, vss, will continue to monitor.

## 2019-01-20 NOTE — ED NOTES
Pt continuing to pull at lines, Pt redirected, monitors replaced, Pt requires frequent redirection. Pt confused, but A&O

## 2019-01-20 NOTE — ASSESSMENT & PLAN NOTE
Normocytic anemia  This may be bone marrow suppression from alcohol toxicity  Ferritin, folate, vit B12 were normal  Monitor for bleeding

## 2019-01-20 NOTE — ED NOTES
Pt out of bed attempting to remove monitors, arguing with significant other. Required continued redirection to remain in bed, seizure precautions needed for reported seizure activity.

## 2019-01-21 ENCOUNTER — TELEPHONE (OUTPATIENT)
Dept: BEHAVIORAL HEALTH | Facility: MEDICAL CENTER | Age: 39
End: 2019-01-21

## 2019-01-21 ENCOUNTER — HOSPITAL ENCOUNTER (OUTPATIENT)
Dept: BEHAVIORAL HEALTH | Facility: MEDICAL CENTER | Age: 39
End: 2019-01-21
Attending: PSYCHIATRY & NEUROLOGY
Payer: COMMERCIAL

## 2019-01-21 ENCOUNTER — PATIENT OUTREACH (OUTPATIENT)
Dept: HEALTH INFORMATION MANAGEMENT | Facility: OTHER | Age: 39
End: 2019-01-21

## 2019-01-21 VITALS
HEART RATE: 81 BPM | WEIGHT: 128.75 LBS | DIASTOLIC BLOOD PRESSURE: 85 MMHG | OXYGEN SATURATION: 99 % | TEMPERATURE: 97.2 F | BODY MASS INDEX: 22.81 KG/M2 | HEIGHT: 63 IN | SYSTOLIC BLOOD PRESSURE: 112 MMHG | RESPIRATION RATE: 16 BRPM

## 2019-01-21 DIAGNOSIS — F10.20 ALCOHOL USE DISORDER, MODERATE, DEPENDENCE (HCC): ICD-10-CM

## 2019-01-21 LAB
ANION GAP SERPL CALC-SCNC: 8 MMOL/L (ref 0–11.9)
BASOPHILS # BLD AUTO: 2.1 % (ref 0–1.8)
BASOPHILS # BLD: 0.09 K/UL (ref 0–0.12)
BUN SERPL-MCNC: 11 MG/DL (ref 8–22)
CALCIUM SERPL-MCNC: 8.2 MG/DL (ref 8.5–10.5)
CHLORIDE SERPL-SCNC: 110 MMOL/L (ref 96–112)
CO2 SERPL-SCNC: 17 MMOL/L (ref 20–33)
CREAT SERPL-MCNC: 0.74 MG/DL (ref 0.5–1.4)
EKG IMPRESSION: NORMAL
EOSINOPHIL # BLD AUTO: 0.19 K/UL (ref 0–0.51)
EOSINOPHIL NFR BLD: 4.4 % (ref 0–6.9)
ERYTHROCYTE [DISTWIDTH] IN BLOOD BY AUTOMATED COUNT: 55.4 FL (ref 35.9–50)
GLUCOSE SERPL-MCNC: 91 MG/DL (ref 65–99)
HCT VFR BLD AUTO: 25.8 % (ref 37–47)
HGB BLD-MCNC: 8.5 G/DL (ref 12–16)
IMM GRANULOCYTES # BLD AUTO: 0.02 K/UL (ref 0–0.11)
IMM GRANULOCYTES NFR BLD AUTO: 0.5 % (ref 0–0.9)
LYMPHOCYTES # BLD AUTO: 1.54 K/UL (ref 1–4.8)
LYMPHOCYTES NFR BLD: 35.6 % (ref 22–41)
MAGNESIUM SERPL-MCNC: 1.6 MG/DL (ref 1.5–2.5)
MCH RBC QN AUTO: 33.3 PG (ref 27–33)
MCHC RBC AUTO-ENTMCNC: 32.9 G/DL (ref 33.6–35)
MCV RBC AUTO: 101.2 FL (ref 81.4–97.8)
MONOCYTES # BLD AUTO: 0.41 K/UL (ref 0–0.85)
MONOCYTES NFR BLD AUTO: 9.5 % (ref 0–13.4)
NEUTROPHILS # BLD AUTO: 2.08 K/UL (ref 2–7.15)
NEUTROPHILS NFR BLD: 47.9 % (ref 44–72)
NRBC # BLD AUTO: 0 K/UL
NRBC BLD-RTO: 0 /100 WBC
PHOSPHATE SERPL-MCNC: 2.9 MG/DL (ref 2.5–4.5)
PLATELET # BLD AUTO: 147 K/UL (ref 164–446)
PMV BLD AUTO: 11.2 FL (ref 9–12.9)
POTASSIUM SERPL-SCNC: 4.2 MMOL/L (ref 3.6–5.5)
RBC # BLD AUTO: 2.55 M/UL (ref 4.2–5.4)
SODIUM SERPL-SCNC: 135 MMOL/L (ref 135–145)
WBC # BLD AUTO: 4.3 K/UL (ref 4.8–10.8)

## 2019-01-21 PROCEDURE — 85025 COMPLETE CBC W/AUTO DIFF WBC: CPT

## 2019-01-21 PROCEDURE — 93010 ELECTROCARDIOGRAM REPORT: CPT | Performed by: INTERNAL MEDICINE

## 2019-01-21 PROCEDURE — 700111 HCHG RX REV CODE 636 W/ 250 OVERRIDE (IP): Performed by: INTERNAL MEDICINE

## 2019-01-21 PROCEDURE — 83735 ASSAY OF MAGNESIUM: CPT

## 2019-01-21 PROCEDURE — 700102 HCHG RX REV CODE 250 W/ 637 OVERRIDE(OP): Performed by: INTERNAL MEDICINE

## 2019-01-21 PROCEDURE — 36415 COLL VENOUS BLD VENIPUNCTURE: CPT

## 2019-01-21 PROCEDURE — 93005 ELECTROCARDIOGRAM TRACING: CPT | Performed by: HOSPITALIST

## 2019-01-21 PROCEDURE — 90732 PPSV23 VACC 2 YRS+ SUBQ/IM: CPT | Performed by: INTERNAL MEDICINE

## 2019-01-21 PROCEDURE — A9270 NON-COVERED ITEM OR SERVICE: HCPCS | Performed by: INTERNAL MEDICINE

## 2019-01-21 PROCEDURE — 84100 ASSAY OF PHOSPHORUS: CPT

## 2019-01-21 PROCEDURE — 700102 HCHG RX REV CODE 250 W/ 637 OVERRIDE(OP): Performed by: HOSPITALIST

## 2019-01-21 PROCEDURE — 700111 HCHG RX REV CODE 636 W/ 250 OVERRIDE (IP): Performed by: HOSPITALIST

## 2019-01-21 PROCEDURE — 99239 HOSP IP/OBS DSCHRG MGMT >30: CPT | Performed by: INTERNAL MEDICINE

## 2019-01-21 PROCEDURE — 90834 PSYTX W PT 45 MINUTES: CPT

## 2019-01-21 PROCEDURE — A9270 NON-COVERED ITEM OR SERVICE: HCPCS | Performed by: HOSPITALIST

## 2019-01-21 PROCEDURE — 90471 IMMUNIZATION ADMIN: CPT

## 2019-01-21 PROCEDURE — 3E0234Z INTRODUCTION OF SERUM, TOXOID AND VACCINE INTO MUSCLE, PERCUTANEOUS APPROACH: ICD-10-PCS | Performed by: INTERNAL MEDICINE

## 2019-01-21 PROCEDURE — 80048 BASIC METABOLIC PNL TOTAL CA: CPT

## 2019-01-21 RX ORDER — POTASSIUM CHLORIDE 20 MEQ/1
40 TABLET, EXTENDED RELEASE ORAL DAILY
Qty: 10 TAB | Refills: 0 | Status: SHIPPED | OUTPATIENT
Start: 2019-01-21 | End: 2019-01-21

## 2019-01-21 RX ADMIN — MAGNESIUM GLUCONATE 500 MG ORAL TABLET 400 MG: 500 TABLET ORAL at 05:35

## 2019-01-21 RX ADMIN — FLUOXETINE HYDROCHLORIDE 20 MG: 20 CAPSULE ORAL at 05:36

## 2019-01-21 RX ADMIN — THERA TABS 1 TABLET: TAB at 05:35

## 2019-01-21 RX ADMIN — PNEUMOCOCCAL VACCINE POLYVALENT 25 MCG
25; 25; 25; 25; 25; 25; 25; 25; 25; 25; 25; 25; 25; 25; 25; 25; 25; 25; 25; 25; 25; 25; 25 INJECTION, SOLUTION INTRAMUSCULAR; SUBCUTANEOUS at 06:28

## 2019-01-21 RX ADMIN — Medication 100 MG: at 05:35

## 2019-01-21 RX ADMIN — TOPIRAMATE 100 MG: 100 TABLET, FILM COATED ORAL at 05:54

## 2019-01-21 RX ADMIN — ENOXAPARIN SODIUM 40 MG: 100 INJECTION SUBCUTANEOUS at 06:27

## 2019-01-21 RX ADMIN — FOLIC ACID 1 MG: 1 TABLET ORAL at 05:35

## 2019-01-21 ASSESSMENT — LIFESTYLE VARIABLES
VISUAL DISTURBANCES: NOT PRESENT
PAROXYSMAL SWEATS: NO SWEAT VISIBLE
ORIENTATION AND CLOUDING OF SENSORIUM: ORIENTED AND CAN DO SERIAL ADDITIONS
NAUSEA AND VOMITING: NO NAUSEA AND NO VOMITING
ANXIETY: MILDLY ANXIOUS
TREMOR: TREMOR NOT VISIBLE BUT CAN BE FELT, FINGERTIP TO FINGERTIP
TOTAL SCORE: 4
HEADACHE, FULLNESS IN HEAD: NOT PRESENT
AUDITORY DISTURBANCES: NOT PRESENT
ORIENTATION AND CLOUDING OF SENSORIUM: ORIENTED AND CAN DO SERIAL ADDITIONS
AUDITORY DISTURBANCES: NOT PRESENT
HEADACHE, FULLNESS IN HEAD: VERY MILD
ANXIETY: *
TREMOR: *
TOTAL SCORE: 4
AGITATION: NORMAL ACTIVITY
PAROXYSMAL SWEATS: BARELY PERCEPTIBLE SWEATING. PALMS MOIST
VISUAL DISTURBANCES: NOT PRESENT
NAUSEA AND VOMITING: NO NAUSEA AND NO VOMITING
AGITATION: NORMAL ACTIVITY

## 2019-01-21 ASSESSMENT — PAIN SCALES - GENERAL
PAINLEVEL_OUTOF10: 0

## 2019-01-21 NOTE — DISCHARGE SUMMARY
Discharge Summary    CHIEF COMPLAINT ON ADMISSION  Chief Complaint   Patient presents with   • Detox       Reason for Admission  Fatigued; Dehydrated     Admission Date  1/19/2019    CODE STATUS  Full Code    HPI & HOSPITAL COURSE  This is a 38 y.o. female here with alcohol withdrawal.    PMH alcohol abuse, HTN, ESRD on HD who was recently admitted for alcohol withdrawal and electrolyte deficiencies, then left AMA but then family convinced her to return to the hospital. She was readmitted for alcohol withdrawal and electrolyte abnormalities. Her potassium, magnesium, and phosphorus improved. Her alcohol withdrawal improved and remained stable off ativan. She had mild transaminitis related to alcohol use. She says her family is a good support system and plans for outpatient alcohol program.    Therefore, she is discharged in good and stable condition to home with close outpatient follow-up.    The patient met 2-midnight criteria for an inpatient stay at the time of discharge.    Discharge Date  1/21/2019    FOLLOW UP ITEMS POST DISCHARGE  Alcohol cessation    DISCHARGE DIAGNOSES  Principal Problem:    Alcohol withdrawal (HCC) POA: Yes  Active Problems:    Anemia POA: Yes      Overview: Mild recheck am    Transaminitis POA: Yes      Overview: Mild, recheck am          Alcohol withdrawal seizure (HCC) POA: Yes  Resolved Problems:    Dehydration POA: Yes    Hypomagnesemia POA: Yes    Hypokalemia POA: Yes      FOLLOW UP  Future Appointments  Date Time Provider Department Center   1/21/2019 5:30 PM INTENSIVE OP PROGRAM IP [address]   1/22/2019 5:30 PM INTENSIVE OP PROGRAM IP [address]   1/24/2019 5:30 PM INTENSIVE OP PROGRAM IP [address]   3/1/2019 2:30 PM Sujey Dunbar M.D. [facility] [address]   3/5/2019 9:00 AM Enedina Hairston L.C.S.W. [facility] [address] M.D.  [address]  [address]  [place] [address]  [phone]      The hospital  left a voicemail with the office to call  you directly to schedule a follow up appointment. If you do not receive a call within 2 days please call to arrange an appointment.       MEDICATIONS ON DISCHARGE     Medication List      START taking these medications      Instructions   magnesium oxide 400 (241.3 Mg) MG Tabs tablet  Start taking on:  1/22/2019  Commonly known as:  MAG-OX   Take 1 Tab by mouth every day for 7 days.  Dose:  400 mg        CONTINUE taking these medications      Instructions   acamprosate 333 MG tablet  Commonly known as:  CAMPRAL   Take 333 mg by mouth 3 times a day.  Dose:  333 mg     ADVIL PM PO   Take 1 Tab by mouth at bedtime as needed (sleep).  Dose:  1 Tab     B-12 1000 MCG Caps   Take 1 Cap by mouth every day.  Dose:  1 Cap     disulfiram 250 MG Tabs  Commonly known as:  ANTABUSE   Take 250 mg by mouth every day.  Dose:  250 mg     FLUoxetine 20 MG Caps  Commonly known as:  PROZAC   Take 20 mg by mouth every day.  Dose:  20 mg     ibuprofen 200 MG Tabs  Commonly known as:  MOTRIN   Take 600 mg by mouth every 6 hours as needed (TMJ).  Dose:  600 mg     lisinopril-hydrochlorothiazide 20-25 MG per tablet  Commonly known as:  PRINZIDE, ZESTORETIC   Take 1 Tab by mouth every day.  Dose:  1 Tab     LO LOESTRIN FE 1 MG-10 MCG / 10 MCG Tabs  Generic drug:  Norethin-Eth Estrad-Fe Biphas   Take 1 Tab by mouth every day. 4 months on 1 month off  Dose:  1 Tab     naltrexone 50 MG Tabs  Commonly known as:  DEPADE   Take 1 Tab by mouth every day.  Dose:  50 mg     topiramate 100 MG Tabs  Commonly known as:  TOPAMAX   Take 1 Tab by mouth 2 times a day.  Dose:  100 mg     traZODone 100 MG Tabs  Commonly known as:  DESYREL   Take 100-200 mg by mouth at bedtime as needed for Sleep.  Dose:  100-200 mg        STOP taking these medications    cefdinir 300 MG Caps  Commonly known as:  OMNICEF            Allergies  No Known Allergies    DIET  Orders Placed This Encounter   Procedures   • Diet Order Regular     Standing Status:   Standing     Number  of Occurrences:   1     Order Specific Question:   Diet:     Answer:   Regular [1]       ACTIVITY  As tolerated.  Weight bearing as tolerated    CONSULTATIONS  None    PROCEDURES  No orders to display         LABORATORY  Lab Results   Component Value Date    SODIUM 135 01/21/2019    POTASSIUM 4.2 01/21/2019    CHLORIDE 110 01/21/2019    CO2 17 (L) 01/21/2019    GLUCOSE 91 01/21/2019    BUN 11 01/21/2019    CREATININE 0.74 01/21/2019    CREATININE 0.9 06/16/2008        Lab Results   Component Value Date    WBC 4.3 (L) 01/21/2019    HEMOGLOBIN 8.5 (L) 01/21/2019    HEMATOCRIT 25.8 (L) 01/21/2019    PLATELETCT 147 (L) 01/21/2019        Total time of the discharge process exceeds 32 minutes.

## 2019-01-21 NOTE — CARE PLAN
Problem: Safety  Goal: Will remain free from injury  Outcome: PROGRESSING AS EXPECTED  Pt remains free from falls at this time. Safety precautions in place. Pt educated on calling for assistance when needed.     Problem: Infection  Goal: Will remain free from infection  Outcome: PROGRESSING AS EXPECTED  Pt remains free from infection at this time. Pt assessed for signs and symptoms of infection. Standard precautions in place.

## 2019-01-21 NOTE — PROGRESS NOTES
Assumed care of patient. Patient resting in bed. Patient denied any pain at this time. Patient safety precautions in place. Call light within reach.

## 2019-01-21 NOTE — PROGRESS NOTES
Received bedside report from RN, pt care assumed, VSS, pt assessment complete. Pt AAOx4, pt c/o doesn't pain at this time. No signs of acute distress noted at this time. POC discussed with pt and verbalizes no questions. Pt denies any additional needs at this time. Bed in lowest position, pt educated on fall risk and verbalized understanding, call light within reach, hourly rounding initiated.

## 2019-01-21 NOTE — PROGRESS NOTES
Discharge instructions given and explained to pt and pt's mother. They verbalized understanding. Their questions were addressed. IV and telemetry monitor taken off. All belongings with pt. RN transported pt via wheelchair to pt's mother's car.

## 2019-01-21 NOTE — DISCHARGE INSTRUCTIONS
Discharge Instructions    Discharged to home by car with relative. Discharged via wheelchair, hospital escort: Yes.  Special equipment needed: Not Applicable    Be sure to schedule a follow-up appointment with your primary care doctor or any specialists as instructed.     Discharge Plan:   Diet Plan: Discussed  Activity Level: Discussed  Confirmed Follow up Appointment: Appointment Scheduled  Confirmed Symptoms Management: Discussed  Medication Reconciliation Updated: Yes  Pneumococcal Vaccine Administered/Refused: Given (See MAR)  Influenza Vaccine Indication: Not indicated: Previously immunized this influenza season and > 8 years of age    I understand that a diet low in cholesterol, fat, and sodium is recommended for good health. Unless I have been given specific instructions below for another diet, I accept this instruction as my diet prescription.   Other diet: Cardaic, low sodium diet    Special Instructions: None    · Is patient discharged on Warfarin / Coumadin?   No     Depression / Suicide Risk    As you are discharged from this [facility] facility, it is important to learn how to keep safe from harming yourself.    Recognize the warning signs:  · Abrupt changes in personality, positive or negative- including increase in energy   · Giving away possessions  · Change in eating patterns- significant weight changes-  positive or negative  · Change in sleeping patterns- unable to sleep or sleeping all the time   · Unwillingness or inability to communicate  · Depression  · Unusual sadness, discouragement and loneliness  · Talk of wanting to die  · Neglect of personal appearance   · Rebelliousness- reckless behavior  · Withdrawal from people/activities they love  · Confusion- inability to concentrate     If you or a loved one observes any of these behaviors or has concerns about self-harm, here's what you can do:  · Talk about it- your feelings and reasons for harming yourself  · Remove any means that you  might use to hurt yourself (examples: pills, rope, extension cords, firearm)  · Get professional help from the community (Mental Health, Substance Abuse, psychological counseling)  · Do not be alone:Call your Safe Contact- someone whom you trust who will be there for you.  · Call your local CRISIS HOTLINE 089-9068 or 377-587-8431  · Call your local Children's Mobile Crisis Response Team [place] (951) 621-1105 or wwwIndisys  · Call the toll free National Suicide Prevention Hotlines   · National Suicide Prevention Lifeline 585-441-VUKS (0490)  · National Hope Line Network 800-SUICIDE (585-3621)      Alcohol Withdrawal  Alcohol withdrawal is a group of symptoms that can develop when a person who drinks heavily and regularly stops drinking or drinks less.  What are the causes?  Heavy and regular drinking can cause chemicals that send signals from the brain to the body (neurotransmitters) to deactivate. Alcohol withdrawal develops when deactivated neurotransmitters reactivate because a person stops drinking or drinks less.  What increases the risk?  The more a person drinks and the longer he or she drinks, the greater the risk of alcohol withdrawal. Severe withdrawal is more likely to develop in someone who:  · Had severe alcohol withdrawal in the past.  · Had a seizure during a previous episode of alcohol withdrawal.  · Is elderly.  · Is pregnant.  · Has been abusing drugs.  · Has other medical problems, including:  ¨ Infection.  ¨ Heart, lung, or liver disease.  ¨ Seizures.  ¨ Mental health problems.  What are the signs or symptoms?  Symptoms of this condition can be mild to moderate, or they can be severe.  Mild to moderate symptoms may include:  · Fatigue.  · Nightmares.  · Trouble sleeping.  · Depression.  · Anxiety.  · Inability to think clearly.  · Mood swings.  · Irritability.  · Loss of appetite.  · Nausea or vomiting.  · Clammy skin.  · Extreme sweating.  · Rapid  heartbeat.  · Shakiness.  · Uncontrollable shaking (tremor).  Severe symptoms may include:  · Fever.  · Seizures.  · Severe confusion.  · Feeling or seeing things that are not there (hallucinations).  Symptoms usually begin within eight hours after a person stops drinking or drinks less. They can last for weeks.  How is this diagnosed?  Alcohol withdrawal is diagnosed with a medical history and physical exam. Sometimes, urine and blood tests are also done.  How is this treated?  Treatment may involve:  · Monitoring blood pressure, pulse, and breathing.  · Getting fluids through an IV tube.  · Medicine to reduce anxiety.  · Medicine to prevent or control seizures.  · Multivitamins and B vitamins.  · Having a health care provider check on you daily.  If symptoms are moderate to severe or if there is a risk of severe withdrawal, treatment may be done at a hospital or treatment center.  Follow these instructions at home:  · Take medicines and vitamin supplements only as directed by your health care provider.  · Do not drink alcohol.  · Have someone stay with you or be available if you need help.  · Drink enough fluid to keep your urine clear or pale yellow.  · Consider joining a 12-step program or another alcohol support group.  Contact a health care provider if:  · Your symptoms get worse or do not go away.  · You cannot keep food or water in your stomach.  · You are struggling with not drinking alcohol.  · You cannot stop drinking alcohol.  Get help right away if:  · You have an irregular heartbeat.  · You have chest pain.  · You have trouble breathing.  · You have symptoms of severe withdrawal, such as:  ¨ A fever.  ¨ Seizures.  ¨ Severe confusion.  ¨ Hallucinations.  This information is not intended to replace advice given to you by your health care provider. Make sure you discuss any questions you have with your health care provider.  Document Released: 09/27/2006 Document Revised: 04/26/2017 Document Reviewed:  10/06/2015  Elsevier Interactive Patient Education © 2017 [employer]

## 2019-01-22 ENCOUNTER — APPOINTMENT (OUTPATIENT)
Dept: BEHAVIORAL HEALTH | Facility: MEDICAL CENTER | Age: 39
End: 2019-01-22
Attending: PSYCHIATRY & NEUROLOGY
Payer: COMMERCIAL

## 2019-01-22 NOTE — CARE PLAN
Problem: Substance Induced Symptoms  Goal: Abstinence    Intervention: Family Counseling Sessions   [facility]  Therapy Progress Note    Patient Name: Donna Morales  Patient MRN: [id number]  Today's Date: 1/21/2019     Type of session:Family therapy  Length of session: 45 minutes  Persons in attendance:Patient and Biological Mother    Subjective/New Info: Patient came in with her mother.  Donna had come in at noon after getting released from the hospital [facility]  I told her I would call her back and let her know after discussing with clinical staff but that it sounded like she needed to go to inpatient treatment due to her medical acuity and inability to stay sober. She came back with her mother at 4:00 and we discussed the reasons she needed to go to inpatient treatment.  I gave her and her Mother information on the [facility] in [place] and we called them and discussed that program and told them what had happened to Donna and how she had just been released from detoxing in the hospital.  Donna agreed to go from here to the [facility] with her mother and agreed that she needed inpatient treatment.  They were also given the number for [facility] in case there were no beds available at [facility] or in case Donna needed longer term care.  Objective/Observations:   Participation: Limited verbal participation, Attentive and Resistant   Grooming: Casual   Cognition: Drowsy/Somnolent and Confused   Eye contact: Limited   Mood: Depressed   Affect: Blunted and Congruent with content   Thought process: confused and slow to respond after being given Ativan this morning in the detox proceedure   Speech: Soft and Latency of response   Other:     Diagnoses: No diagnosis found. alcohol use disorder F10.20    Current risk:   SUICIDE: Not applicable   Homicide: Not applicable   Self-harm: Not applicable   Relapse: High   Other:    Safety  Plan reviewed? Not Indicated   If evidence of imminent risk is present, intervention/plan:     Therapeutic Intervention(s): Goal-setting, Maladaptive behavior addressed, Positive behavior reinforced and Treatment compliance addressed    Treatment Goal(s)/Objective(s) addressed: inpatient treatment recommended and patient and mother left to go to [facility]     Progress toward Treatment Goals: No change    Plan:  - Patient is in agreement with the above plan:  YES    Jackelyn Tinoco R.N.  1/21/2019

## 2019-01-24 ENCOUNTER — APPOINTMENT (OUTPATIENT)
Dept: BEHAVIORAL HEALTH | Facility: MEDICAL CENTER | Age: 39
End: 2019-01-24
Attending: PSYCHIATRY & NEUROLOGY
Payer: COMMERCIAL

## 2019-01-28 ENCOUNTER — APPOINTMENT (OUTPATIENT)
Dept: BEHAVIORAL HEALTH | Facility: MEDICAL CENTER | Age: 39
End: 2019-01-28
Attending: PSYCHIATRY & NEUROLOGY
Payer: COMMERCIAL

## 2019-01-29 ENCOUNTER — APPOINTMENT (OUTPATIENT)
Dept: BEHAVIORAL HEALTH | Facility: MEDICAL CENTER | Age: 39
End: 2019-01-29
Attending: PSYCHIATRY & NEUROLOGY
Payer: COMMERCIAL

## 2019-01-31 ENCOUNTER — APPOINTMENT (OUTPATIENT)
Dept: BEHAVIORAL HEALTH | Facility: MEDICAL CENTER | Age: 39
End: 2019-01-31
Attending: PSYCHIATRY & NEUROLOGY
Payer: COMMERCIAL

## 2019-02-05 ENCOUNTER — TELEPHONE (OUTPATIENT)
Dept: BEHAVIORAL HEALTH | Facility: MEDICAL CENTER | Age: 39
End: 2019-02-05

## 2019-02-07 ENCOUNTER — TELEPHONE (OUTPATIENT)
Dept: BEHAVIORAL HEALTH | Facility: MEDICAL CENTER | Age: 39
End: 2019-02-07

## 2019-03-01 ENCOUNTER — OFFICE VISIT (OUTPATIENT)
Dept: BEHAVIORAL HEALTH | Facility: CLINIC | Age: 39
End: 2019-03-01
Payer: COMMERCIAL

## 2019-03-01 VITALS
BODY MASS INDEX: 23.21 KG/M2 | HEIGHT: 63 IN | SYSTOLIC BLOOD PRESSURE: 142 MMHG | DIASTOLIC BLOOD PRESSURE: 93 MMHG | HEART RATE: 113 BPM | WEIGHT: 131 LBS

## 2019-03-01 DIAGNOSIS — F10.20 ALCOHOL USE DISORDER, SEVERE, DEPENDENCE (HCC): ICD-10-CM

## 2019-03-01 DIAGNOSIS — F41.9 ANXIETY DISORDER, UNSPECIFIED TYPE: ICD-10-CM

## 2019-03-01 DIAGNOSIS — E53.8 VITAMIN B12 DEFICIENCY: ICD-10-CM

## 2019-03-01 DIAGNOSIS — E51.9 VITAMIN B1 DEFICIENCY: ICD-10-CM

## 2019-03-01 DIAGNOSIS — F51.04 CHRONIC INSOMNIA: ICD-10-CM

## 2019-03-01 PROBLEM — D69.6 THROMBOCYTOPENIA (HCC): Status: RESOLVED | Noted: 2019-01-18 | Resolved: 2019-03-01

## 2019-03-01 PROBLEM — F10.939 ALCOHOL WITHDRAWAL SEIZURE (HCC): Status: RESOLVED | Noted: 2019-01-18 | Resolved: 2019-03-01

## 2019-03-01 PROBLEM — R56.9 ALCOHOL WITHDRAWAL SEIZURE (HCC): Status: RESOLVED | Noted: 2019-01-18 | Resolved: 2019-03-01

## 2019-03-01 PROBLEM — F10.939 ALCOHOL WITHDRAWAL (HCC): Status: RESOLVED | Noted: 2019-01-18 | Resolved: 2019-03-01

## 2019-03-01 PROCEDURE — 99214 OFFICE O/P EST MOD 30 MIN: CPT | Performed by: PSYCHIATRY & NEUROLOGY

## 2019-03-01 RX ORDER — HYDROXYZINE HYDROCHLORIDE 25 MG/1
25 TABLET, FILM COATED ORAL 2 TIMES DAILY PRN
COMMUNITY
End: 2019-03-01 | Stop reason: SDUPTHER

## 2019-03-01 RX ORDER — TRAZODONE HYDROCHLORIDE 100 MG/1
100 TABLET ORAL NIGHTLY
COMMUNITY
End: 2019-03-01 | Stop reason: SDUPTHER

## 2019-03-01 RX ORDER — FERROUS SULFATE 325(65) MG
325 TABLET ORAL 2 TIMES DAILY
COMMUNITY
End: 2019-11-14

## 2019-03-01 RX ORDER — ACAMPROSATE CALCIUM 333 MG/1
333 TABLET, DELAYED RELEASE ORAL 3 TIMES DAILY
Qty: 90 TAB | Refills: 1 | Status: SHIPPED | OUTPATIENT
Start: 2019-03-01 | End: 2019-04-16 | Stop reason: SDUPTHER

## 2019-03-01 RX ORDER — METHION/INOS/CHOL BT/B COM/LIV 110MG-86MG
100 CAPSULE ORAL DAILY
Qty: 30 TAB | Refills: 1 | Status: SHIPPED | OUTPATIENT
Start: 2019-03-01 | End: 2019-04-16 | Stop reason: SDUPTHER

## 2019-03-01 RX ORDER — TRAZODONE HYDROCHLORIDE 100 MG/1
100 TABLET ORAL
Qty: 39 TAB | Refills: 1 | Status: SHIPPED | OUTPATIENT
Start: 2019-03-01 | End: 2019-04-16 | Stop reason: SDUPTHER

## 2019-03-01 RX ORDER — CHOLECALCIFEROL (VITAMIN D3) 125 MCG
500 CAPSULE ORAL DAILY
COMMUNITY
End: 2019-10-11

## 2019-03-01 RX ORDER — METHION/INOS/CHOL BT/B COM/LIV 110MG-86MG
CAPSULE ORAL
COMMUNITY
End: 2019-03-01 | Stop reason: SDUPTHER

## 2019-03-01 RX ORDER — HYDROXYZINE HYDROCHLORIDE 25 MG/1
25 TABLET, FILM COATED ORAL 2 TIMES DAILY PRN
Qty: 60 TAB | Refills: 1 | Status: SHIPPED | OUTPATIENT
Start: 2019-03-01 | End: 2019-04-16 | Stop reason: SDUPTHER

## 2019-03-01 RX ORDER — DISULFIRAM 250 MG/1
250 TABLET ORAL DAILY
Qty: 30 TAB | Refills: 1 | Status: SHIPPED | OUTPATIENT
Start: 2019-03-01 | End: 2019-04-16

## 2019-03-01 RX ORDER — FLUOXETINE HYDROCHLORIDE 40 MG/1
40 CAPSULE ORAL DAILY
Qty: 30 CAP | Refills: 1 | Status: SHIPPED | OUTPATIENT
Start: 2019-03-01 | End: 2019-04-16 | Stop reason: SDUPTHER

## 2019-03-01 NOTE — PROGRESS NOTES
PSYCHIATRY FOLLOW-UP NOTE      Chief Complaint   Patient presents with   • Follow-Up     alcohol use disorder, anxiety         History Of Present Illness:  Donna Morales is a 38 y.o. old female with hypertension, alcohol use disorder, anxiety disorder, chronic insomnia comes in today for follow up, was last seen over 1 year ago.  She continues to struggle with alcohol use and has had a few ER visits and hospitalizations for detox and withdrawal symptoms.  She is currently doing an intensive outpatient program at [facility] and has been sober from alcohol since 2/6/19.  She is feeling good about her recovery right now but continues to have cravings for alcohol.  She states that she lost her grandfather in December 2018 and her alcohol use significantly increased after that.  She was closed with his grandfather and losing him was hard for her.  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  She is currently in a relationship and has good support from her boyfriend.  She has noticed without alcohol her anxiety has increased.  She reports partial compliance with her medications in the last 1 year but has been taking them more consistently for the last month or so.  She is feeling better without alcohol but is still concerned about her relapse.  Sleep has been good with trazodone.  She denies any current mood symptoms.  She denies any self-harm behavior.  She denies having thoughts of wanting to hurt herself or others.    Social History:   She is currently in a relationship.  She has never been  and has no kids.  She lives alone in [place].  She has good support from her mother who lives close by.  She is employed full-time as a .      Substance Use:  Alcohol - Sober since 2/6/19  Nicotine - Denies  Illicit drugs - Denies    Past Medication Trials:  Ativan, Buspirone    Medications:  Current Outpatient Prescriptions   Medication Sig  "Dispense Refill   • ferrous sulfate 325 (65 Fe) MG tablet Take 325 mg by mouth 2 Times a Day.     • COLLAGEN PO Take  by mouth.     • cyanocobalamin (VITAMIN B-12) 500 MCG Tab Take 500 mcg by mouth every day.     • Ibuprofen-Diphenhydramine Cit (ADVIL PM PO) Take  by mouth.     • fluoxetine (PROZAC) 40 MG capsule Take 1 Cap by mouth every day. 30 Cap 1   • acamprosate (CAMPRAL) 333 MG tablet Take 1 Tab by mouth 3 times a day. 90 Tab 1   • traZODone (DESYREL) 100 MG Tab Take 1 Tab by mouth every bedtime. 39 Tab 1   • hydrOXYzine HCl (ATARAX) 25 MG Tab Take 1 Tab by mouth 2 times a day as needed for Anxiety. 60 Tab 1   • disulfiram (ANTABUSE) 250 MG Tab Take 1 Tab by mouth every day. 30 Tab 1   • Thiamine HCl (B-1) 100 MG Tab Take 100 mg by mouth every day. 30 Tab 1   • naltrexone (DEPADE) 50 MG Tab Take 1 Tab by mouth every day. 30 Tab 1   • topiramate (TOPAMAX) 100 MG Tab Take 1 Tab by mouth 2 times a day. 60 Tab 1   • LO LOESTRIN FE 1 MG-10 MCG / 10 MCG Tab Take 1 Tab by mouth every day. 4 months on 1 month off  11   • lisinopril-hydrochlorothiazide (PRINZIDE, ZESTORETIC) 20-25 MG per tablet Take 1 Tab by mouth every day.     • ibuprofen (MOTRIN) 200 MG Tab Take 600 mg by mouth every 6 hours as needed (TMJ).       No current facility-administered medications for this visit.        Review Of Systems:    Constitutional - Positive for fatigue  Respiratory - Negative for shortness of breath, cough  CVS - Negative for chest pain, palpitations  GI - Negative for nausea, vomiting, abdominal pain, diarrhea, constipation  Musculoskeletal - Negative for back pain  Neurological - Negative for headaches  Psychiatric - Positive for anxiety, cravings for alcohol    Physical Examination:  Vital signs: /93   Pulse (!) 113   Ht 1.6 m (5' 3\")   Wt 59.4 kg (131 lb)   BMI 23.21 kg/m²     Musculoskeletal: Normal gait. No abnormal movements.     Mental Status Evaluation:   General: Young black female, dressed in casual attire, " "good grooming and hygiene, in no apparent distress, calm and cooperative, good eye contact, no psychomotor agitation or retardation  Orientation: Alert and oriented to person, place and time  Recent and remote memory: Grossly intact  Attention span and concentration: Grossly intact  Speech: Spontaneous, normal rate, rhythm and tone  Thought Process: Linear, logical and goal directed  Thought Content: Denies suicidal or homicidal ideations, intent or plan  Perception: Denies auditory or visual hallucinations. No delusions noted  Associations: Intact  Language: Appropriate  Fund of knowledge and vocabulary: Grossly adequate  Mood: \"am good now\"  Affect: Anxious, mood congruent  Insight: Good  Judgment: Good    Depression screening:  Depression Screen (PHQ-2/PHQ-9) 1/18/2019 1/19/2019   PHQ-2 Total Score 0 0       Interpretation of PHQ-9 Total Score   Score Severity   1-4 No Depression   5-9 Mild Depression   10-14 Moderate Depression   15-19 Moderately Severe Depression   20-27 Severe Depression    Medical Records/Labs/Diagnostic Tests Reviewed:  [place]  records - one prescription found in the last 1 year, no abuse suspected      Impression:  1. Alcohol use disorder, severe (sober since 2/6/19) - stable  2. Unspecified anxiety disorder - worsening  3. Vitamin B12 and B1 deficiency - stable  4. Chronic insomnia secondary to alcohol use - stable    Plan:  1.  Continue intensive outpatient program at [facility].  I have advised her to continue to maintain sobriety from alcohol.  I have reviewed her labs and informed her about pancytopenia which could be related to chronic heavy alcohol use.  Will recheck CBC, CMP, B1, B12 and folic acid given continued heavy alcohol use and history of vitamin B1 and B12 deficiency.  2.  Increase Prozac to 40 mg daily for depression and anxiety  3.  Continue Hydroxyzine 25 mg twice daily as needed for anxiety  4.  Continue Trazodone 100 mg at bedtime for sleep  5.  Restart " Antabuse 250 mg daily for alcohol use disorder.  6.  Continue Topamax 100 mg twice daily, Campral 333 mg 3 times daily and Naltrexone 50 mg daily for alcohol use disorder  7.  Continue vitamin B1 and B12 supplementation  8.  Continue individual psychotherapy with Alisia Hairston LCSW  9.  I have advised her to improve compliance with her appointments as well as medications.    Return to clinic in 6 weeks or sooner if symptoms worsen    The proposed treatment plan was discussed with the patient who was provided the opportunity to ask questions and make suggestions regarding alternative treatment. Patient verbalized understanding and expressed agreement with the plan.     Sujey Dunbar M.D.  03/01/19    This note was created using voice recognition software (Dragon). The accuracy of the dictation is limited by the abilities of the software. I have reviewed the note prior to signing, however some errors in grammar and context are still possible. If you have any questions related to this note please do not hesitate to contact our office.

## 2019-04-09 ENCOUNTER — OFFICE VISIT (OUTPATIENT)
Dept: BEHAVIORAL HEALTH | Facility: CLINIC | Age: 39
End: 2019-04-09
Payer: COMMERCIAL

## 2019-04-09 DIAGNOSIS — F41.9 ANXIETY DISORDER, UNSPECIFIED TYPE: ICD-10-CM

## 2019-04-09 DIAGNOSIS — F10.20 ALCOHOL USE DISORDER, SEVERE, DEPENDENCE (HCC): ICD-10-CM

## 2019-04-09 PROCEDURE — 90832 PSYTX W PT 30 MINUTES: CPT | Performed by: SOCIAL WORKER

## 2019-04-09 NOTE — BH THERAPY
" [facility]  Therapy Progress Note    Patient Name: Donna Morales  Patient MRN: [id number]  Today's Date: 4/9/2019     Type of session:Individual psychotherapy  Length of session: 30 minutes  Persons in attendance:Patient    Subjective/New Info:15 min late, delayed at work. Pt reports she is continuing to attend outpatient program at [facility].  States this program is ongoing, will likely last through the summer.  Pt says she has \"lapsed a couple times\", the most recent being a weekend in early April.  She says she has disclosed the lapses to her counselor.  Admits that sobriety remains a struggle, especially when she has unstructured time.  Pt was given a breathalyzer test yesterday by principal; understands this, but says there is no record of her being impaired at work.      Objective/Observations:   Participation: Active verbal participation   Grooming: Casual and Neat   Cognition: Fully Oriented   Eye contact: Good   Mood: Anxious   Affect: Congruent with content   Thought process: Goal-directed   Speech: Rate within normal limits, Volume within normal limits and over detailed   Other:     Diagnoses:   1. Alcohol use disorder, severe, dependence (HCC)    2. Anxiety disorder, unspecified type         Current risk:   SUICIDE: Low   Homicide: Low   Self-harm: Low   Relapse: Moderate   Other:    Safety Plan reviewed? No   If evidence of imminent risk is present, intervention/plan:     Therapeutic Intervention(s): Cognitive modification, Maladaptive behavior addressed, Positive behavior reinforced, Problem-solving and Supportive psychotherapy    Treatment Goal(s)/Objective(s) addressed: Maintain sobriety     Progress toward Treatment Goals: Mild improvement    Plan:  Continue individual therapy/med mgmt    Enedina Hairston L.C.S.W.  4/9/2019                                 "

## 2019-04-16 ENCOUNTER — OFFICE VISIT (OUTPATIENT)
Dept: BEHAVIORAL HEALTH | Facility: CLINIC | Age: 39
End: 2019-04-16
Payer: COMMERCIAL

## 2019-04-16 VITALS — HEIGHT: 63 IN | WEIGHT: 124 LBS | HEART RATE: 117 BPM | BODY MASS INDEX: 21.97 KG/M2

## 2019-04-16 DIAGNOSIS — F41.9 ANXIETY DISORDER, UNSPECIFIED TYPE: ICD-10-CM

## 2019-04-16 DIAGNOSIS — F10.20 ALCOHOL USE DISORDER, SEVERE, DEPENDENCE (HCC): ICD-10-CM

## 2019-04-16 DIAGNOSIS — F51.04 CHRONIC INSOMNIA: ICD-10-CM

## 2019-04-16 DIAGNOSIS — E53.8 VITAMIN B12 DEFICIENCY: ICD-10-CM

## 2019-04-16 DIAGNOSIS — E51.9 VITAMIN B1 DEFICIENCY: ICD-10-CM

## 2019-04-16 PROCEDURE — 99214 OFFICE O/P EST MOD 30 MIN: CPT | Performed by: PSYCHIATRY & NEUROLOGY

## 2019-04-16 RX ORDER — FLUOXETINE HYDROCHLORIDE 40 MG/1
40 CAPSULE ORAL DAILY
Qty: 30 CAP | Refills: 1 | Status: SHIPPED | OUTPATIENT
Start: 2019-04-16 | End: 2019-06-06

## 2019-04-16 RX ORDER — METHION/INOS/CHOL BT/B COM/LIV 110MG-86MG
100 CAPSULE ORAL DAILY
Qty: 30 TAB | Refills: 1 | Status: SHIPPED | OUTPATIENT
Start: 2019-04-16 | End: 2019-10-11

## 2019-04-16 RX ORDER — TRAZODONE HYDROCHLORIDE 100 MG/1
100 TABLET ORAL
Qty: 30 TAB | Refills: 1 | Status: SHIPPED | OUTPATIENT
Start: 2019-04-16 | End: 2019-07-30 | Stop reason: SDUPTHER

## 2019-04-16 RX ORDER — HYDROXYZINE HYDROCHLORIDE 25 MG/1
25 TABLET, FILM COATED ORAL 2 TIMES DAILY PRN
Qty: 60 TAB | Refills: 1 | Status: SHIPPED | OUTPATIENT
Start: 2019-04-16 | End: 2019-10-11

## 2019-04-16 RX ORDER — NALTREXONE HYDROCHLORIDE 50 MG/1
50 TABLET, FILM COATED ORAL DAILY
Qty: 30 TAB | Refills: 1 | Status: SHIPPED | OUTPATIENT
Start: 2019-04-16 | End: 2019-10-11

## 2019-04-16 RX ORDER — LORAZEPAM 0.5 MG/1
0.5 TABLET ORAL EVERY 4 HOURS PRN
COMMUNITY
End: 2019-04-16

## 2019-04-16 RX ORDER — ACAMPROSATE CALCIUM 333 MG/1
333 TABLET, DELAYED RELEASE ORAL 3 TIMES DAILY
Qty: 90 TAB | Refills: 1 | Status: SHIPPED | OUTPATIENT
Start: 2019-04-16 | End: 2019-11-14 | Stop reason: SDUPTHER

## 2019-04-16 RX ORDER — TOPIRAMATE 100 MG/1
100 TABLET, FILM COATED ORAL 2 TIMES DAILY
Qty: 60 TAB | Refills: 1 | Status: SHIPPED | OUTPATIENT
Start: 2019-04-16 | End: 2019-11-14 | Stop reason: SDUPTHER

## 2019-04-16 RX ORDER — NALTREXONE HYDROCHLORIDE 50 MG/1
50 TABLET, FILM COATED ORAL DAILY
Qty: 30 TAB | Refills: 1 | Status: SHIPPED | OUTPATIENT
Start: 2019-04-16 | End: 2019-04-16 | Stop reason: SDUPTHER

## 2019-04-16 NOTE — PROGRESS NOTES
PSYCHIATRY FOLLOW-UP NOTE      Chief Complaint   Patient presents with   • Follow-Up     alcohol use disorder, anxiety         History Of Present Illness:  Donna Morales is a 38 y.o. old female with hypertension, alcohol use disorder, anxiety disorder, chronic insomnia comes in today for follow up, was last seen 6 weeks ago.  She had 1 relapse a couple of weekends ago since her last appointment with me.  She went out with a friend who was moving out of town and she had 2 drinks of alcohol in a social setting.  She did buy a bottle of hard liquor but did not drink it.  She is still in the intensive outpatient program and would like to continue it throughout the summer months.  She has a really hard time during breaks from school.  She has applied to summer school and is hopeful that she will get the job which will keep her busy during the summer months.  She has not been on Antabuse and naltrexone as she never received the refills from her pharmacy.  She has been feeling less anxious since her last appointment.  She was started on 0.5 mg of Ativan by her PCP for alcohol cravings.  She has been using Ativan as prescribed and denies taking more than the prescribed amounts.  She has good support from her boyfriend who is wanting her to stop alcohol completely.  She denies any increase in his psychosocial stressors.  Sleep and appetite have been good.  She denies any current mood symptoms.  She denies having thoughts of wanting to hurt herself or others.    Social History:   She is currently in a relationship.  She has never been  and has no kids.  She lives alone in [place].  She has good support from her mother who lives close by.  She is employed full-time as a .    Substance Use:  Alcohol - Sober since 3/30/19  Nicotine - Denies  Illicit drugs - Denies    Past Medication Trials:  Ativan, Buspirone    Medications:  Current Outpatient Prescriptions   Medication Sig Dispense Refill   •  "topiramate (TOPAMAX) 100 MG Tab Take 1 Tab by mouth 2 times a day. 60 Tab 1   • traZODone (DESYREL) 100 MG Tab Take 1 Tab by mouth every bedtime. 30 Tab 1   • fluoxetine (PROZAC) 40 MG capsule Take 1 Cap by mouth every day. 30 Cap 1   • Thiamine HCl (B-1) 100 MG Tab Take 100 mg by mouth every day. 30 Tab 1   • acamprosate (CAMPRAL) 333 MG tablet Take 1 Tab by mouth 3 times a day. 90 Tab 1   • hydrOXYzine HCl (ATARAX) 25 MG Tab Take 1 Tab by mouth 2 times a day as needed for Anxiety. 60 Tab 1   • naltrexone (DEPADE) 50 MG Tab Take 1 Tab by mouth every day. 30 Tab 1   • ferrous sulfate 325 (65 Fe) MG tablet Take 325 mg by mouth 2 Times a Day.     • cyanocobalamin (VITAMIN B-12) 500 MCG Tab Take 500 mcg by mouth every day.     • LO LOESTRIN FE 1 MG-10 MCG / 10 MCG Tab Take 1 Tab by mouth every day. 4 months on 1 month off  11   • lisinopril-hydrochlorothiazide (PRINZIDE, ZESTORETIC) 20-25 MG per tablet Take 1 Tab by mouth every day.     • COLLAGEN PO Take  by mouth.     • Ibuprofen-Diphenhydramine Cit (ADVIL PM PO) Take  by mouth.     • ibuprofen (MOTRIN) 200 MG Tab Take 600 mg by mouth every 6 hours as needed (TMJ).       No current facility-administered medications for this visit.        Review Of Systems:    Constitutional - Positive for fatigue  Respiratory - Negative for shortness of breath, cough  CVS - Negative for chest pain, palpitations  GI - Negative for nausea, vomiting, abdominal pain, diarrhea, constipation  Musculoskeletal - Negative for back pain  Neurological - Negative for headaches  Psychiatric - Positive for anxiety, cravings for alcohol    Physical Examination:  Vital signs: Pulse (!) 117   Ht 1.6 m (5' 3\")   Wt 56.2 kg (124 lb)   BMI 21.97 kg/m²     Musculoskeletal: Normal gait. No abnormal movements.     Mental Status Evaluation:   General: Young black female, dressed in casual attire, good grooming and hygiene, in no apparent distress, calm and cooperative, good eye contact, no psychomotor " "agitation or retardation  Orientation: Alert and oriented to person, place and time  Recent and remote memory: Grossly intact  Attention span and concentration: Grossly intact  Speech: Spontaneous, normal rate, rhythm and tone  Thought Process: Linear, logical and goal directed  Thought Content: Denies suicidal or homicidal ideations, intent or plan  Perception: Denies auditory or visual hallucinations. No delusions noted  Associations: Intact  Language: Appropriate  Fund of knowledge and vocabulary: Grossly adequate  Mood: \"okay\"  Affect: Euthymic, mood congruent  Insight: Good  Judgment: Good    Depression screening:  Depression Screen (PHQ-2/PHQ-9) 1/18/2019 1/19/2019   PHQ-2 Total Score 0 0     Interpretation of PHQ-9 Total Score   Score Severity   1-4 No Depression   5-9 Mild Depression   10-14 Moderate Depression   15-19 Moderately Severe Depression   20-27 Severe Depression    Medical Records/Labs/Diagnostic Tests Reviewed:  [facility] records - appropriate refills, no abuse suspected      Impression:  1. Alcohol use disorder, severe (sober since 3/30/19) - stable  2. Unspecified anxiety disorder - improving   3. Vitamin B12 and B1 deficiency - stable  4. Chronic insomnia secondary to alcohol use - stable    Plan:  1.  Continue intensive outpatient program at [facility].   2.  Discontinue Antabuse.  She never picked up the prescription and is okay with not being on it for now.  3.  Restart Naltrexone 50 mg daily for alcohol use disorder.  She never picked up her prescription after her last appointment but has been having cravings for alcohol and I have asked her to start Naltrexone for the same.  4.  Continue Campral 333 mg 3 times daily and Topamax 100 mg twice daily for alcohol use disorder.  She has been taking her Campral twice daily and I have asked her to increase her dose to 3 times daily for better efficacy.  5.  Continue Prozac 40 mg daily for depression and anxiety  6.  Continue Hydroxyzine " 25 mg twice daily as needed for anxiety  7.  Continue vitamin B1 and B12 supplementation  8.  Reminded her to get the blood work done prior to her next appointment  9.  Continue individual psychotherapy with Enedina Hairston LCSW  10.  Discontinue Ativan.  She was started on Ativan 0.5 mg by her PCP for alcohol cravings and I have discussed the addictive nature of Ativan and to stop Ativan at this time.  11.  Continue to maintain sobriety from alcohol    Return to clinic in 2 months or sooner if symptoms worsen    The proposed treatment plan was discussed with the patient who was provided the opportunity to ask questions and make suggestions regarding alternative treatment. Patient verbalized understanding and expressed agreement with the plan.     Sujey Dunbar M.D.  04/16/19    This note was created using voice recognition software (Dragon). The accuracy of the dictation is limited by the abilities of the software. I have reviewed the note prior to signing, however some errors in grammar and context are still possible. If you have any questions related to this note please do not hesitate to contact our office.

## 2019-04-30 ENCOUNTER — HOSPITAL ENCOUNTER (OUTPATIENT)
Dept: LAB | Facility: MEDICAL CENTER | Age: 39
End: 2019-04-30
Attending: PSYCHIATRY & NEUROLOGY
Payer: COMMERCIAL

## 2019-04-30 DIAGNOSIS — E51.9 VITAMIN B1 DEFICIENCY: ICD-10-CM

## 2019-04-30 DIAGNOSIS — F10.20 ALCOHOL USE DISORDER, SEVERE, DEPENDENCE (HCC): ICD-10-CM

## 2019-04-30 DIAGNOSIS — E53.8 VITAMIN B12 DEFICIENCY: ICD-10-CM

## 2019-04-30 LAB
ALBUMIN SERPL BCP-MCNC: 4.6 G/DL (ref 3.2–4.9)
ALBUMIN/GLOB SERPL: 2 G/DL
ALP SERPL-CCNC: 50 U/L (ref 30–99)
ALT SERPL-CCNC: 12 U/L (ref 2–50)
ANION GAP SERPL CALC-SCNC: 11 MMOL/L (ref 0–11.9)
AST SERPL-CCNC: 48 U/L (ref 12–45)
BILIRUB SERPL-MCNC: 0.5 MG/DL (ref 0.1–1.5)
BUN SERPL-MCNC: 12 MG/DL (ref 8–22)
CALCIUM SERPL-MCNC: 9 MG/DL (ref 8.5–10.5)
CHLORIDE SERPL-SCNC: 105 MMOL/L (ref 96–112)
CO2 SERPL-SCNC: 21 MMOL/L (ref 20–33)
CREAT SERPL-MCNC: 1.06 MG/DL (ref 0.5–1.4)
ERYTHROCYTE [DISTWIDTH] IN BLOOD BY AUTOMATED COUNT: 58.9 FL (ref 35.9–50)
FASTING STATUS PATIENT QL REPORTED: NORMAL
GLOBULIN SER CALC-MCNC: 2.3 G/DL (ref 1.9–3.5)
GLUCOSE SERPL-MCNC: 98 MG/DL (ref 65–99)
HCT VFR BLD AUTO: 28.4 % (ref 37–47)
HGB BLD-MCNC: 8.9 G/DL (ref 12–16)
MCH RBC QN AUTO: 29.4 PG (ref 27–33)
MCHC RBC AUTO-ENTMCNC: 31.3 G/DL (ref 33.6–35)
MCV RBC AUTO: 93.7 FL (ref 81.4–97.8)
PLATELET # BLD AUTO: 229 K/UL (ref 164–446)
PMV BLD AUTO: 9.6 FL (ref 9–12.9)
POTASSIUM SERPL-SCNC: 3.9 MMOL/L (ref 3.6–5.5)
PROT SERPL-MCNC: 6.9 G/DL (ref 6–8.2)
RBC # BLD AUTO: 3.03 M/UL (ref 4.2–5.4)
SODIUM SERPL-SCNC: 137 MMOL/L (ref 135–145)
WBC # BLD AUTO: 5.7 K/UL (ref 4.8–10.8)

## 2019-04-30 PROCEDURE — 85027 COMPLETE CBC AUTOMATED: CPT

## 2019-04-30 PROCEDURE — 36415 COLL VENOUS BLD VENIPUNCTURE: CPT

## 2019-04-30 PROCEDURE — 82607 VITAMIN B-12: CPT

## 2019-04-30 PROCEDURE — 82746 ASSAY OF FOLIC ACID SERUM: CPT

## 2019-04-30 PROCEDURE — 84425 ASSAY OF VITAMIN B-1: CPT

## 2019-04-30 PROCEDURE — 80053 COMPREHEN METABOLIC PANEL: CPT

## 2019-05-01 LAB
FOLATE SERPL-MCNC: 8.1 NG/ML
VIT B12 SERPL-MCNC: 330 PG/ML (ref 211–911)

## 2019-05-04 LAB — VIT B1 BLD-MCNC: 48 NMOL/L (ref 70–180)

## 2019-05-06 ENCOUNTER — TELEPHONE (OUTPATIENT)
Dept: BEHAVIORAL HEALTH | Facility: CLINIC | Age: 39
End: 2019-05-06

## 2019-05-06 NOTE — TELEPHONE ENCOUNTER
Called to discuss low vitamin B1.  No answer, unable to leave message as her mailbox was full.  Will discuss the results at her follow-up appointment.

## 2019-06-05 ENCOUNTER — APPOINTMENT (OUTPATIENT)
Dept: BEHAVIORAL HEALTH | Facility: CLINIC | Age: 39
End: 2019-06-05

## 2019-06-24 ENCOUNTER — OFFICE VISIT (OUTPATIENT)
Dept: URGENT CARE | Facility: PHYSICIAN GROUP | Age: 39
End: 2019-06-24
Payer: COMMERCIAL

## 2019-06-24 VITALS
OXYGEN SATURATION: 99 % | RESPIRATION RATE: 18 BRPM | WEIGHT: 120 LBS | DIASTOLIC BLOOD PRESSURE: 84 MMHG | HEIGHT: 63 IN | HEART RATE: 108 BPM | SYSTOLIC BLOOD PRESSURE: 138 MMHG | TEMPERATURE: 99.7 F | BODY MASS INDEX: 21.26 KG/M2

## 2019-06-24 DIAGNOSIS — S30.1XXA CONTUSION OF ABDOMINAL WALL, INITIAL ENCOUNTER: ICD-10-CM

## 2019-06-24 DIAGNOSIS — S16.1XXA STRAIN OF NECK MUSCLE, INITIAL ENCOUNTER: ICD-10-CM

## 2019-06-24 DIAGNOSIS — S06.0X0A CONCUSSION WITHOUT LOSS OF CONSCIOUSNESS, INITIAL ENCOUNTER: ICD-10-CM
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
## 2019-06-24 PROCEDURE — 99214 OFFICE O/P EST MOD 30 MIN: CPT | Performed by: FAMILY MEDICINE

## 2019-06-24 RX ORDER — CYCLOBENZAPRINE HCL 10 MG
10 TABLET ORAL 3 TIMES DAILY PRN
Qty: 20 TAB | Refills: 0 | Status: SHIPPED | OUTPATIENT
Start: 2019-06-24 | End: 2019-10-11

## 2019-06-24 ASSESSMENT — ENCOUNTER SYMPTOMS
FOCAL WEAKNESS: 0
EYE REDNESS: 0
WEIGHT LOSS: 0
BLOOD IN STOOL: 0
FLANK PAIN: 0
EYE DISCHARGE: 0
SENSORY CHANGE: 0
BACK PAIN: 0
ABDOMINAL PAIN: 1
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
## 2019-08-05 ENCOUNTER — DOCUMENTATION (OUTPATIENT)
Dept: BEHAVIORAL HEALTH | Facility: CLINIC | Age: 39
End: 2019-08-05

## 2019-08-05 ENCOUNTER — TELEPHONE (OUTPATIENT)
Dept: BEHAVIORAL HEALTH | Facility: CLINIC | Age: 39
End: 2019-08-05

## 2019-08-05 NOTE — TELEPHONE ENCOUNTER
Per Dr. Dunbar attempted to reach out to pt to offer sooner apt with one of our residents. Pts VM was full and not active on mychart

## 2019-08-10 ENCOUNTER — HOSPITAL ENCOUNTER (OUTPATIENT)
Dept: LAB | Facility: MEDICAL CENTER | Age: 39
End: 2019-08-10
Attending: FAMILY MEDICINE
Payer: COMMERCIAL

## 2019-08-10 LAB
ALBUMIN SERPL BCP-MCNC: 3.5 G/DL (ref 3.2–4.9)
ALBUMIN/GLOB SERPL: 1.3 G/DL
ALP SERPL-CCNC: 67 U/L (ref 30–99)
ALT SERPL-CCNC: 15 U/L (ref 2–50)
ANION GAP SERPL CALC-SCNC: 10 MMOL/L (ref 0–11.9)
ANISOCYTOSIS BLD QL SMEAR: ABNORMAL
AST SERPL-CCNC: 73 U/L (ref 12–45)
BASOPHILS # BLD AUTO: 3.1 % (ref 0–1.8)
BASOPHILS # BLD: 0.15 K/UL (ref 0–0.12)
BILIRUB SERPL-MCNC: 0.5 MG/DL (ref 0.1–1.5)
BUN SERPL-MCNC: 11 MG/DL (ref 8–22)
CALCIUM SERPL-MCNC: 8.5 MG/DL (ref 8.5–10.5)
CHLORIDE SERPL-SCNC: 105 MMOL/L (ref 96–112)
CO2 SERPL-SCNC: 24 MMOL/L (ref 20–33)
COMMENT 1642: NORMAL
CREAT SERPL-MCNC: 0.95 MG/DL (ref 0.5–1.4)
EOSINOPHIL # BLD AUTO: 0.07 K/UL (ref 0–0.51)
EOSINOPHIL NFR BLD: 1.5 % (ref 0–6.9)
ERYTHROCYTE [DISTWIDTH] IN BLOOD BY AUTOMATED COUNT: 66.6 FL (ref 35.9–50)
FERRITIN SERPL-MCNC: 40.5 NG/ML (ref 10–291)
GIANT PLATELETS BLD QL SMEAR: NORMAL
GLOBULIN SER CALC-MCNC: 2.8 G/DL (ref 1.9–3.5)
GLUCOSE SERPL-MCNC: 81 MG/DL (ref 65–99)
HCT VFR BLD AUTO: 26.3 % (ref 37–47)
HGB BLD-MCNC: 8.4 G/DL (ref 12–16)
IMM GRANULOCYTES # BLD AUTO: 0.02 K/UL (ref 0–0.11)
IMM GRANULOCYTES NFR BLD AUTO: 0.4 % (ref 0–0.9)
IRON SATN MFR SERPL: 16 % (ref 15–55)
IRON SERPL-MCNC: 53 UG/DL (ref 40–170)
LG PLATELETS BLD QL SMEAR: NORMAL
LYMPHOCYTES # BLD AUTO: 1.84 K/UL (ref 1–4.8)
LYMPHOCYTES NFR BLD: 38.4 % (ref 22–41)
MACROCYTES BLD QL SMEAR: ABNORMAL
MCH RBC QN AUTO: 30.3 PG (ref 27–33)
MCHC RBC AUTO-ENTMCNC: 31.9 G/DL (ref 33.6–35)
MCV RBC AUTO: 94.9 FL (ref 81.4–97.8)
MONOCYTES # BLD AUTO: 0.66 K/UL (ref 0–0.85)
MONOCYTES NFR BLD AUTO: 13.8 % (ref 0–13.4)
MORPHOLOGY BLD-IMP: NORMAL
NEUTROPHILS # BLD AUTO: 2.05 K/UL (ref 2–7.15)
NEUTROPHILS NFR BLD: 42.8 % (ref 44–72)
NRBC # BLD AUTO: 0 K/UL
NRBC BLD-RTO: 0 /100 WBC
OVALOCYTES BLD QL SMEAR: NORMAL
PLATELET # BLD AUTO: 367 K/UL (ref 164–446)
PLATELET BLD QL SMEAR: NORMAL
PMV BLD AUTO: 9.4 FL (ref 9–12.9)
POTASSIUM SERPL-SCNC: 4.3 MMOL/L (ref 3.6–5.5)
PROT SERPL-MCNC: 6.3 G/DL (ref 6–8.2)
RBC # BLD AUTO: 2.77 M/UL (ref 4.2–5.4)
RBC BLD AUTO: PRESENT
SODIUM SERPL-SCNC: 139 MMOL/L (ref 135–145)
TARGETS BLD QL SMEAR: NORMAL
TIBC SERPL-MCNC: 333 UG/DL (ref 250–450)
VIT B12 SERPL-MCNC: 493 PG/ML (ref 211–911)
WBC # BLD AUTO: 4.8 K/UL (ref 4.8–10.8)

## 2019-08-10 PROCEDURE — 85025 COMPLETE CBC W/AUTO DIFF WBC: CPT

## 2019-08-10 PROCEDURE — 82607 VITAMIN B-12: CPT

## 2019-08-10 PROCEDURE — 83540 ASSAY OF IRON: CPT

## 2019-08-10 PROCEDURE — 84425 ASSAY OF VITAMIN B-1: CPT

## 2019-08-10 PROCEDURE — 36415 COLL VENOUS BLD VENIPUNCTURE: CPT

## 2019-08-10 PROCEDURE — 80053 COMPREHEN METABOLIC PANEL: CPT

## 2019-08-10 PROCEDURE — 83550 IRON BINDING TEST: CPT

## 2019-08-10 PROCEDURE — 82728 ASSAY OF FERRITIN: CPT

## 2019-08-13 ENCOUNTER — OFFICE VISIT (OUTPATIENT)
Dept: BEHAVIORAL HEALTH | Facility: CLINIC | Age: 39
End: 2019-08-13
Payer: COMMERCIAL

## 2019-08-13 ENCOUNTER — HOSPITAL ENCOUNTER (OUTPATIENT)
Dept: BEHAVIORAL HEALTH | Facility: MEDICAL CENTER | Age: 39
End: 2019-08-13
Attending: PSYCHIATRY & NEUROLOGY
Payer: COMMERCIAL

## 2019-08-13 DIAGNOSIS — F10.20 ALCOHOL USE DISORDER, SEVERE, DEPENDENCE (HCC): ICD-10-CM

## 2019-08-13 PROCEDURE — 90791 PSYCH DIAGNOSTIC EVALUATION: CPT

## 2019-08-13 PROCEDURE — 90834 PSYTX W PT 45 MINUTES: CPT | Performed by: SOCIAL WORKER
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
## 2019-08-13 NOTE — PROGRESS NOTES
I have reviewed the note by Jackelyn Tinoco RN and agree with the assessment and treatment plan.    1. Admit to Intensive Outpatient Program on 8/14/19   - Group therapy per schedule,    - Psychoeducational groups per schedule,   - Individual/family counseling sessions with  per treatment plan.  2. Symptoms necessitating Intensive Outpatient Treatment: excessive alcohol use  3. Medical screening/Physical exam per primary care provider or referring facility.    She follows with me on an outpatient basis and has struggled with severe alcohol use disorder for the last several years.  She has had several ER visits and hospitalizations for alcohol detox.  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  She has done several outpatient rehab programs over the last few years but unfortunately they do not help with long-term sobriety.  I would recommend a 30-90-day inpatient rehab for her if possible.  She can do our intensive outpatient program until she can get into an inpatient rehab if she is agreeable to it.    Sujey Dunbar MD

## 2019-08-14 ENCOUNTER — HOSPITAL ENCOUNTER (OUTPATIENT)
Dept: BEHAVIORAL HEALTH | Facility: MEDICAL CENTER | Age: 39
End: 2019-08-14
Attending: PSYCHIATRY & NEUROLOGY
Payer: COMMERCIAL

## 2019-08-14 NOTE — BH THERAPY
Group Therapy Checklist  Attendance: Attended  Attendance Duration (min): (90)  Number of Participants: 5  Program/Group: Intensive Outpatient Program  Topics Covered: (Group Therapy)  Participation: Active verbal participation, Attentive. On her first day in IOP Program pt let group know that she was here for alcoholism and that she is planning on going to residential Tx as soon as a place is identified and she makes arrangements with work.   Affect/Mood Range: Constricted  Affect/Mood Display: Congruent w/content  Cognition: Oriented, Alert  Evidence of Imminent Suicide Risk: No  Evidence of imminent homicide risk: No  Therapeutic Interventions: Cognitive modification  Progress Toward Treatment Goal: Moderate improvement

## 2019-08-14 NOTE — BH THERAPY
Group Therapy Checklist  Attendance: Attended  Attendance Duration (min): (60)  Number of Participants: 5  Program/Group: Intensive Outpatient Program  Topics Covered: Caretaking, Caring in Relationships  Participation: Active verbal participation, Attentive  Affect/Mood Range: Normal range, Flexible  Affect/Mood Display: Congruent w/content  Cognition: Alert, Oriented  Evidence of Imminent Suicide Risk: No  Evidence of imminent homicide risk: No  Therapeutic Interventions: Cognitive clarification, Emotion clarification, Self-care skills  Progress Toward Treatment Goal: Mild improvement

## 2019-08-14 NOTE — CARE PLAN
Problem: Mood Instability Interfering with [employer]  Goal: Abstinence  Intervention: Individual Counseling Sessions  Note:    [facility]  Therapy Progress Note    Patient Name: Donna Morales  Patient MRN: [id number]  Today's Date: 8/14/2019     Type of session:Individual psychotherapy  Length of session: 30 minutes  Persons in attendance:Patient    Subjective/New Info: Met with pt to establish rapport and developed initial Care Plan. Pt meeting with her [employer] and HR this afternoon to make preparations to leave work for residential treatment of 30 - 90 days as recommended by Dr. Dunbar.  Pt will have an MRI on her liver this week and will be able to leave for residential treatment once medically released by her doctor.     Objective/Observations:   Participation: Active verbal participation, Attentive, Engaged and Open to feedback   Grooming: Good, Casual and Neat   Cognition: Alert and Fully Oriented   Eye contact: Good   Mood: Anxious   Affect: Flexible   Thought process: Logical and Goal-directed   Speech: Rate within normal limits and Volume within normal limits   Other:     Diagnoses: Alcohol Abuse: Severe    Current risk:   SUICIDE: Not applicable   Homicide: Not applicable   Self-harm: Not applicable   Relapse: Moderate   Other:    Safety Plan reviewed? Not Indicated   If evidence of imminent risk is present, intervention/plan:     Therapeutic Intervention(s): Clarify:  Clarify feelings and Clarify thoughts, Establish rapport, Goal-setting, Maladaptive behavior addressed and Supportive psychotherapy    Treatment Goal(s)/Objective(s) addressed: Sobriety maintenance and transfer to a higher level of care when the pt is ready to go.      Progress toward Treatment Goals: Moderate improvement    Plan:  - Patient is in agreement with the above plan:  YES    ISIDRO Rangel  8/14/2019

## 2019-08-15 ENCOUNTER — HOSPITAL ENCOUNTER (OUTPATIENT)
Dept: BEHAVIORAL HEALTH | Facility: MEDICAL CENTER | Age: 39
End: 2019-08-15
Attending: PSYCHIATRY & NEUROLOGY
Payer: COMMERCIAL

## 2019-08-15 DIAGNOSIS — F10.20 ALCOHOL USE DISORDER, SEVERE, DEPENDENCE (HCC): ICD-10-CM

## 2019-08-15 DIAGNOSIS — K70.10 ACUTE ALCOHOLIC HEPATITIS: ICD-10-CM

## 2019-08-15 DIAGNOSIS — F41.9 ANXIETY DISORDER, UNSPECIFIED TYPE: ICD-10-CM

## 2019-08-15 LAB — VIT B1 BLD-MCNC: 83 NMOL/L (ref 70–180)

## 2019-08-15 PROCEDURE — 90853 GROUP PSYCHOTHERAPY: CPT

## 2019-08-15 NOTE — BH THERAPY
Group Therapy Checklist  Attendance: Attended  Attendance Duration (min): (60 min)  Number of Participants: 8  Program/Group: Intensive Outpatient Program  Topics Covered: Stress Management  Participation: Active verbal participation, Attentive  Affect/Mood Range: Normal range  Affect/Mood Display: Congruent w/content  Cognition: Alert, Oriented  Evidence of Imminent Suicide Risk: No  Evidence of imminent homicide risk: No  Therapeutic Interventions: Behavioral modification  Progress Toward Treatment Goal: Mild improvement

## 2019-08-15 NOTE — BH THERAPY
Group Therapy Checklist  Attendance Duration (min): (90)  Number of Participants: 8  Program/Group: Intensive Outpatient Program  Topics Covered: (Process Group)  Participation: Limited verbal participation, Attentive, Open to feedback. Donna was quiet but she was engaged and she was supportive of other group members.   Affect/Mood Range: Normal range  Affect/Mood Display: Congruent w/content, Sad, Tearful  Cognition: Alert, Oriented  Evidence of Imminent Suicide Risk: No  Evidence of imminent homicide risk: No  Therapeutic Interventions: Emotion clarification, Cognitive clarification, Supportive psychotherapy  Progress Toward Treatment Goal: Mild improvement

## 2019-08-16 ENCOUNTER — HOSPITAL ENCOUNTER (OUTPATIENT)
Dept: BEHAVIORAL HEALTH | Facility: MEDICAL CENTER | Age: 39
End: 2019-08-16
Attending: PSYCHIATRY & NEUROLOGY
Payer: COMMERCIAL

## 2019-08-16 DIAGNOSIS — F10.20 ALCOHOL USE DISORDER, SEVERE, DEPENDENCE (HCC): ICD-10-CM

## 2019-08-16 PROCEDURE — 90853 GROUP PSYCHOTHERAPY: CPT

## 2019-08-16 NOTE — BH THERAPY
Group Therapy Checklist  10:00 am  Attendance Duration (min): (90 min)  Number of Participants: 9  Program/Group: Intensive Outpatient Program  Topics Covered: Other (Comment):(process group)  Participation: Active verbal participation, Attentive, Open to feedback, Supportive to other group members  Processed her need to go to a meeting daily and be accountable for her sobriety. Receptive to support from peers.    Affect/Mood Range: Normal range, Flexible  Affect/Mood Display: Congruent w/content  Cognition: Alert, Oriented  Evidence of Imminent Suicide Risk: No  Evidence of imminent homicide risk: No  Therapeutic Interventions: Emotion clarification, Cognitive clarification  Progress Toward Treatment Goal: Mild improvement

## 2019-08-16 NOTE — BH THERAPY
Group Therapy Checklist  Attendance: Attended  Attendance Duration (min): (60)  Number of Participants: 9  Program/Group: Intensive Outpatient Program  Topics Covered: Med aspects [place]  Participation: Attentive  Affect/Mood Range: Normal range, Flexible  Affect/Mood Display: Congruent w/content  Cognition: Alert, Oriented  Evidence of Imminent Suicide Risk: No  Evidence of imminent homicide risk: No  Therapeutic Interventions: Cognitive clarification, Relapse prevention  Progress Toward Treatment Goal: Moderate improvement

## 2019-08-19 ENCOUNTER — HOSPITAL ENCOUNTER (OUTPATIENT)
Dept: BEHAVIORAL HEALTH | Facility: MEDICAL CENTER | Age: 39
End: 2019-08-19
Attending: PSYCHIATRY & NEUROLOGY
Payer: COMMERCIAL

## 2019-08-19 ENCOUNTER — TELEPHONE (OUTPATIENT)
Dept: BEHAVIORAL HEALTH | Facility: MEDICAL CENTER | Age: 39
End: 2019-08-19

## 2019-08-19 DIAGNOSIS — F10.20 ACUTE ALCOHOLISM (HCC): ICD-10-CM

## 2019-08-19 PROCEDURE — 90853 GROUP PSYCHOTHERAPY: CPT | Performed by: MARRIAGE & FAMILY THERAPIST

## 2019-08-19 NOTE — BH THERAPY
Group Therapy Checklist  Attendance Duration (min): (90)  Number of Participants: 8  Program/Group: Partial Hospital Program  Topics Covered: (Process Group)  Participation: Active verbal participation, Attentive. Donna shared that connecting with her Higher Power this weekend has helped her deal better with her feelings of fear.   Affect/Mood Range: Normal range  Affect/Mood Display: Congruent w/content  Cognition: Alert, Oriented  Evidence of Imminent Suicide Risk: No  Evidence of imminent homicide risk: No  Therapeutic Interventions: Cognitive clarification, Emotion clarification, Supportive psychotherapy  Progress Toward Treatment Goal: Mild improvement

## 2019-08-19 NOTE — BH THERAPY
Group Therapy Checklist  Attendance: Attended  Attendance Duration (min): (60)  Number of Participants: 8  Program/Group: Intensive Outpatient Program  Topics Covered: Relapse Prevention  Participation: Limited verbal participation, Attentive  Affect/Mood Range: Flexible  Affect/Mood Display: Congruent w/content  Cognition: Oriented, Alert  Evidence of Imminent Suicide Risk: No  Evidence of imminent homicide risk: No  Therapeutic Interventions: Psychoeducation re: (Comment), Cognitive clarification  Progress Toward Treatment Goal: Moderate improvement

## 2019-08-19 NOTE — TELEPHONE ENCOUNTER
[facility]    Treatment Team Staffing    Patient Name: Donna Morales Program: IOP Date: 8/19/2019     Attendees: Jackelyn Tinoco RN, [facility]; GOYO Zhou, [facility]; Esme Puente RN, Pavithra Martínez, [facility], and Sujey Dunbar MD     Patient's Progress toward Goals Listed on the Treatment Plan: saw PCP on Friday, waiting on testing: upper GI, liver US. She's anemic. Waiting for residential treatment following medical stability.     1. Client's Participation When in Attendance Was: Active in a Positive Way    2. Counselor's Evaluation of Client's Progress: Positive Movement    3. Patient is attending group and individual sessions and is progressing well toward the treatment goals: yes      YES NO   A. Relapse During Program []  [x]    B. Requires physician review []  [x]    C. Referral to program inappropriate []  [x]    D. Non compliance with Treatment Plan []  [x]    E. Early treatment termination (lack of attendance) []  [x]     []  []      Comments: reports abstinence, Celebrate Recovery tonight. She feels supported by anti-craving meds when she remembers to take them.     Treatment Plan Review: - Continue Intensive Outpatient Program and 12-Step participation  - Next appointment scheduled:  8/20/2019  - Patient is in agreement with the above plan:  YES

## 2019-08-20 ENCOUNTER — HOSPITAL ENCOUNTER (OUTPATIENT)
Dept: BEHAVIORAL HEALTH | Facility: MEDICAL CENTER | Age: 39
End: 2019-08-20
Attending: PSYCHIATRY & NEUROLOGY
Payer: COMMERCIAL

## 2019-08-20 DIAGNOSIS — F10.20 ALCOHOL USE DISORDER, SEVERE, DEPENDENCE (HCC): ICD-10-CM

## 2019-08-20 PROCEDURE — 90853 GROUP PSYCHOTHERAPY: CPT

## 2019-08-20 NOTE — BH THERAPY
Group Therapy Checklist  Attendance: Attended  Attendance Duration (min): (60)  Number of Participants: 10  Program/Group: Intensive Outpatient Program  Topics Covered: Journal writing  Participation: Active verbal participation, Attentive. Pt processed her nearly dying due to her alcoholism and supported a peer who also had this happen.  Affect/Mood Range: Normal range, Flexible  Affect/Mood Display: Congruent w/content  Cognition: Alert, Oriented  Evidence of Imminent Suicide Risk: No  Evidence of imminent homicide risk: No  Therapeutic Interventions: Emotion clarification, Self-care skills  Progress Toward Treatment Goal: Moderate improvement

## 2019-08-20 NOTE — BH THERAPY
Group Therapy Checklist  Attendance: Attended  Attendance Duration (min): (60)  Number of Participants: 10  Program/Group: Intensive Outpatient Program  Topics Covered: Journal writing  Participation: Active verbal participation, Attentive  Affect/Mood Range: Normal range, Flexible  Affect/Mood Display: Congruent w/content  Cognition: Alert, Oriented  Evidence of Imminent Suicide Risk: No  Evidence of imminent homicide risk: No  Therapeutic Interventions: Emotion clarification, Self-care skills  Progress Toward Treatment Goal: Moderate improvement

## 2019-08-21 ENCOUNTER — HOSPITAL ENCOUNTER (OUTPATIENT)
Dept: BEHAVIORAL HEALTH | Facility: MEDICAL CENTER | Age: 39
End: 2019-08-21
Attending: PSYCHIATRY & NEUROLOGY
Payer: COMMERCIAL

## 2019-08-21 DIAGNOSIS — F10.20 ALCOHOL USE DISORDER, SEVERE, DEPENDENCE (HCC): ICD-10-CM

## 2019-08-21 DIAGNOSIS — F41.9 ANXIETY DISORDER, UNSPECIFIED TYPE: ICD-10-CM

## 2019-08-21 PROCEDURE — 90853 GROUP PSYCHOTHERAPY: CPT

## 2019-08-21 NOTE — BH THERAPY
"Group Therapy Checklist  Attendance: Attended  Attendance Duration (min): (60 min)  Number of Participants: 5  Program/Group: Intensive Outpatient Program  Topics Covered: \"Pieces of Silence\"  Participation: Active verbal participation, Attentive  Affect/Mood Range: Normal range, Flexible  Affect/Mood Display: Congruent w/content  Cognition: Alert, Oriented  Evidence of Imminent Suicide Risk: No  Evidence of imminent homicide risk: No  Therapeutic Interventions: Cognitive clarification, Emotion clarification  Progress Toward Treatment Goal: Moderate improvement  "

## 2019-08-21 NOTE — BH THERAPY
Group Therapy Checklist  Attendance Duration (min): (90)  Number of Participants: 5  Program/Group: Intensive Outpatient Program  Topics Covered: (Group Therapy)  Participation: Active verbal participation, Attentive, Supportive to other group members.   Pt processed the emotion of 'anxiety' as her most difficult/uncomfortable feeling to manage.   Affect/Mood Range: Flexible  Affect/Mood Display: Congruent w/content  Cognition: Oriented, Alert  Evidence of Imminent Suicide Risk: No  Evidence of imminent homicide risk: No  Therapeutic Interventions: Emotion clarification, Cognitive modification  Progress Toward Treatment Goal: Moderate improvement

## 2019-08-22 ENCOUNTER — HOSPITAL ENCOUNTER (OUTPATIENT)
Dept: BEHAVIORAL HEALTH | Facility: MEDICAL CENTER | Age: 39
End: 2019-08-22
Attending: PSYCHIATRY & NEUROLOGY
Payer: COMMERCIAL

## 2019-08-22 DIAGNOSIS — F10.20 ACUTE ALCOHOLISM (HCC): ICD-10-CM

## 2019-08-22 DIAGNOSIS — F41.9 ANXIETY DISORDER, UNSPECIFIED TYPE: ICD-10-CM

## 2019-08-22 DIAGNOSIS — F10.20 ALCOHOL USE DISORDER, SEVERE, DEPENDENCE (HCC): ICD-10-CM

## 2019-08-22 PROCEDURE — 90837 PSYTX W PT 60 MINUTES: CPT

## 2019-08-22 PROCEDURE — 90853 GROUP PSYCHOTHERAPY: CPT | Performed by: MARRIAGE & FAMILY THERAPIST

## 2019-08-22 NOTE — BH THERAPY
Group Therapy Checklist  Attendance: Attended  Attendance Duration (min): (60)  Number of Participants: 9  Program/Group: Intensive Outpatient Program  Topics Covered: Dual Diagnosis  Participation: Active verbal participation, Attentive  Affect/Mood Range: Flexible  Affect/Mood Display: Congruent w/content  Cognition: Oriented, Alert  Evidence of Imminent Suicide Risk: No  Evidence of imminent homicide risk: No  Therapeutic Interventions: Emotion clarification, Cognitive clarification  Progress Toward Treatment Goal: Moderate improvement

## 2019-08-22 NOTE — BH THERAPY
Group Therapy Checklist  Attendance: Attended  Attendance Duration (min): (90)  Number of Participants: 9  Program/Group: Intensive Outpatient Program  Topics Covered: (Process Group)  Participation: Active verbal participation, Attentive, Supportive to other group members, Open to feedback. Donna's first wish was that she had never picked up her first drink.   Affect/Mood Range: Normal range  Affect/Mood Display: Congruent w/content  Cognition: Alert, Oriented  Evidence of Imminent Suicide Risk: No  Evidence of imminent homicide risk: No  Therapeutic Interventions: Emotion clarification, Cognitive clarification, Supportive psychotherapy  Progress Toward Treatment Goal: Mild improvement

## 2019-08-22 NOTE — CARE PLAN
[facility]  Therapy Progress Note    Patient Name: Donna Morales  Patient MRN: [id number]  Today's Date: 8/22/2019     Type of session:Individual psychotherapy  Length of session: 45 minutes  Persons in attendance:Patient    Subjective/New Info: Completed 13 days of sobriety and is feeling a litter better. States she is really motivated to stay sober and expressed fear about her medical condition. Became tearful when discussing the possibility of loosing her job.  Somewhat receptive to redirection that she needs to make her sobriety her first priority.  Agrees to get back to attending AA as well as celebrate recovery.      Objective/Observations:   Participation: Active verbal participation, Attentive, Engaged and Open to feedback   Grooming: Casual   Cognition: Alert and Fully Oriented   Eye contact: Good   Mood: Depressed and Anxious   Affect: Flexible, Full range, Sad and Anxious   Thought process: Logical and Goal-directed   Speech: Rate within normal limits and Volume within normal limits   Other:     Diagnoses: No diagnosis found.     Current risk:   SUICIDE: Not applicable   Homicide: Not applicable   Self-harm: Not applicable   Relapse: Moderate   Other:    Safety Plan reviewed? Not Indicated   If evidence of imminent risk is present, intervention/plan:     Therapeutic Intervention(s): Conflict clarification, Develop/modify treatment plan, Goal-setting, Maladaptive behavior addressed and Positive behavior reinforced    Treatment Goal(s)/Objective(s) addressed: Completed first week of treatment. Staying sober. Will work on assessment tools and step one.       Progress toward Treatment Goals: Mild improvement    Plan:  - Continue Individual therapy, Group therapy, Intensive Outpatient Program and 12-Step participation    Jacklyn Avendano R.N.  8/22/2019

## 2019-08-23 ENCOUNTER — HOSPITAL ENCOUNTER (OUTPATIENT)
Dept: BEHAVIORAL HEALTH | Facility: MEDICAL CENTER | Age: 39
End: 2019-08-23
Attending: PSYCHIATRY & NEUROLOGY
Payer: COMMERCIAL

## 2019-08-23 DIAGNOSIS — F10.20 ALCOHOL USE DISORDER, SEVERE, DEPENDENCE (HCC): ICD-10-CM

## 2019-08-23 DIAGNOSIS — F41.9 ANXIETY DISORDER, UNSPECIFIED TYPE: ICD-10-CM

## 2019-08-23 PROCEDURE — 90853 GROUP PSYCHOTHERAPY: CPT

## 2019-08-23 NOTE — BH THERAPY
Group Therapy Checklist  Attendance: Attended  Attendance Duration (min): (60 min)  Program/Group: Intensive Outpatient Program  Participation: Active verbal participation, Attentive  Affect/Mood Range: Flexible, Normal range  Affect/Mood Display: Congruent w/content  Cognition: Alert, Oriented  Evidence of Imminent Suicide Risk: No  Evidence of imminent homicide risk: No  Therapeutic Interventions: Emotion clarification, Cognitive clarification  Progress Toward Treatment Goal: Moderate improvement

## 2019-08-23 NOTE — BH THERAPY
Group Therapy Checklist  Attendance: Attended  Attendance Duration (min): (90)  Program/Group: Intensive Outpatient Program  Topics Covered: (Group Therapy)  Participation: Active verbal participation, Attentive. Pt able to identify with others having physical problems with organs due to drinking.  She will be going IP after next week.  Affect/Mood Range: Flexible  Affect/Mood Display: Congruent w/content  Cognition: Oriented, Alert  Evidence of Imminent Suicide Risk: No  Evidence of imminent homicide risk: No  Therapeutic Interventions: Emotion clarification, Cognitive clarification  Progress Toward Treatment Goal: Moderate improvement

## 2019-08-26 ENCOUNTER — APPOINTMENT (OUTPATIENT)
Dept: BEHAVIORAL HEALTH | Facility: MEDICAL CENTER | Age: 39
End: 2019-08-26
Attending: PSYCHIATRY & NEUROLOGY
Payer: COMMERCIAL

## 2019-08-26 ENCOUNTER — TELEPHONE (OUTPATIENT)
Dept: BEHAVIORAL HEALTH | Facility: MEDICAL CENTER | Age: 39
End: 2019-08-26

## 2019-08-26 NOTE — TELEPHONE ENCOUNTER
Donna called and stated that she will not be in today as she is getting an assessment for inpatient treatment.

## 2019-08-27 ENCOUNTER — HOSPITAL ENCOUNTER (OUTPATIENT)
Dept: BEHAVIORAL HEALTH | Facility: MEDICAL CENTER | Age: 39
End: 2019-08-27
Attending: PSYCHIATRY & NEUROLOGY
Payer: COMMERCIAL

## 2019-08-27 DIAGNOSIS — F10.20 ACUTE ALCOHOLISM (HCC): ICD-10-CM

## 2019-08-27 PROCEDURE — 90853 GROUP PSYCHOTHERAPY: CPT | Performed by: MARRIAGE & FAMILY THERAPIST

## 2019-08-27 NOTE — BH THERAPY
"Group Therapy Checklist  Attendance: Attended  Attendance Duration (min): (90)  Number of Participants: 12  Program/Group: Intensive Outpatient Program  Topics Covered: (Group Therpay)  Participation: Active verbal participation, Attentive. Pt participated in exercise of \"highest hopes and deepest fears.\"  She fears  Irreversible damage to her liver as she is presently going through testing.   Affect/Mood Range: Flexible  Affect/Mood Display: Congruent w/content  Cognition: Oriented, Alert  Evidence of Imminent Suicide Risk: No  Evidence of imminent homicide risk: No  Therapeutic Interventions: Emotion clarification, Cognitive clarification  Progress Toward Treatment Goal: Moderate improvement  "

## 2019-08-27 NOTE — BH THERAPY
Group Therapy Checklist  Attendance Duration (min): (60 min)  Number of Participants: 12  Program/Group: Intensive Outpatient Program  Topics Covered: Grief & Loss  Participation: Active verbal participation, Attentive  Affect/Mood Range: Normal range, Flexible  Affect/Mood Display: Congruent w/content  Cognition: Alert, Oriented  Evidence of Imminent Suicide Risk: No  Evidence of imminent homicide risk: No  Therapeutic Interventions: Emotion clarification, Cognitive clarification  Progress Toward Treatment Goal: Moderate improvement

## 2019-08-28 ENCOUNTER — HOSPITAL ENCOUNTER (OUTPATIENT)
Dept: BEHAVIORAL HEALTH | Facility: MEDICAL CENTER | Age: 39
End: 2019-08-28
Attending: PSYCHIATRY & NEUROLOGY
Payer: COMMERCIAL

## 2019-08-28 DIAGNOSIS — F41.9 ANXIETY DISORDER, UNSPECIFIED TYPE: ICD-10-CM

## 2019-08-28 DIAGNOSIS — F10.20 ALCOHOL USE DISORDER, SEVERE, DEPENDENCE (HCC): ICD-10-CM

## 2019-08-28 PROCEDURE — 90853 GROUP PSYCHOTHERAPY: CPT

## 2019-08-28 NOTE — BH THERAPY
Group Therapy Checklist  Attendance: Attended  Attendance Duration (min): (90)  Number of Participants: 8  Program/Group: Intensive Outpatient Program  Topics Covered: (Group Therapy)  Participation: Active verbal participation, Attentive, Supportive to other group members. Pt shared her frustration of not getting into [facility] for IP for her alcoholism as she has 3 weeks clean.  She was encouraged to look at other places again.  Affect/Mood Range: Flexible  Affect/Mood Display: Congruent w/content  Cognition: Oriented, Alert  Evidence of Imminent Suicide Risk: No  Evidence of imminent homicide risk: No  Therapeutic Interventions: Emotion clarification, Cognitive clarification  Progress Toward Treatment Goal: Moderate improvement

## 2019-08-28 NOTE — BH THERAPY
Group Therapy Checklist  Attendance: Attended  Attendance Duration (min): (60 min)  Number of Participants: 10  Program/Group: Intensive Outpatient Program  Topics Covered: Cognitive distortions  Participation: Active verbal participation, Attentive, Open to feedback  Affect/Mood Range: Flexible, Normal range  Affect/Mood Display: Congruent w/content  Cognition: Alert, Oriented  Evidence of Imminent Suicide Risk: No  Evidence of imminent homicide risk: No  Therapeutic Interventions: Cognitive modification, Behavioral modification  Progress Toward Treatment Goal: Moderate improvement

## 2019-08-29 ENCOUNTER — HOSPITAL ENCOUNTER (OUTPATIENT)
Dept: BEHAVIORAL HEALTH | Facility: MEDICAL CENTER | Age: 39
End: 2019-08-29
Attending: PSYCHIATRY & NEUROLOGY
Payer: COMMERCIAL

## 2019-08-29 DIAGNOSIS — F10.20 ALCOHOL USE DISORDER, SEVERE, DEPENDENCE (HCC): ICD-10-CM

## 2019-08-29 DIAGNOSIS — K70.10 ACUTE ALCOHOLIC HEPATITIS: ICD-10-CM

## 2019-08-29 DIAGNOSIS — F10.20 ACUTE ALCOHOLISM (HCC): ICD-10-CM

## 2019-08-29 PROCEDURE — 90834 PSYTX W PT 45 MINUTES: CPT

## 2019-08-29 PROCEDURE — 90853 GROUP PSYCHOTHERAPY: CPT | Performed by: MARRIAGE & FAMILY THERAPIST

## 2019-08-29 NOTE — BH THERAPY
Group Therapy Checklist  Attendance: Attended  Attendance Duration (min): (60)  Number of Participants: 12  Program/Group: Intensive Outpatient Program  Topics Covered: Spirituality  Participation: Active verbal participation, Attentive  Affect/Mood Range: Flexible  Affect/Mood Display: Congruent w/content  Cognition: Oriented, Alert  Evidence of Imminent Suicide Risk: No  Evidence of imminent homicide risk: No  Therapeutic Interventions: Psychoeducation re: (Comment), Cognitive clarification  Progress Toward Treatment Goal: Moderate improvement

## 2019-08-29 NOTE — CARE PLAN
[facility]  Therapy Progress Note    Patient Name: Donna Morales  Patient MRN: [id number]  Today's Date: 8/29/2019     Type of session:Individual psychotherapy  Length of session: 45 minutes  Persons in attendance:Patient    Subjective/New Info: Discussed her history of inability to stay sober and how she has significant physical consequences of her disease and expressed fear that she will not be able to get the help that she needs.  She has in the past been unwilling to go inpatient to treatment and now she has reached the decision to go inpatient. Will continue to do the footwork to go to inpatient program. In the meantime we will continue to monitor her for sobriety until she is placed.      Objective/Observations:   Participation: Active verbal participation   Grooming: Casual and Neat   Cognition: Alert and Fully Oriented   Eye contact: Good   Mood: Depressed and Anxious   Affect: Flexible and Full range   Thought process: Logical and Goal-directed   Speech: Rate within normal limits and Volume within normal limits   Other:     Diagnoses: No diagnosis found. Alcohol Use Disorder.     Current risk:   SUICIDE: Not applicable   Homicide: Not applicable   Self-harm: Not applicable   Relapse: Moderate   Other:    Safety Plan reviewed? Not Indicated   If evidence of imminent risk is present, intervention/plan:     Therapeutic Intervention(s): Cognitive modification, Conflict clarification, Develop/modify treatment plan, Treatment compliance addressed and continue to get admitted to an inpatient program to  increase the probability of her maintaining sobriety.      Treatment Goal(s)/Objective(s) addressed: Will contact [facility] to get to go inpatient for 30 days of Rehab.     Progress toward Treatment Goals: Mild improvement    Plan:  - Termination of IOP to Residential treatment.     Jacklyn Avendano R.N.  8/29/2019

## 2019-08-29 NOTE — BH THERAPY
Group Therapy Checklist  Attendance Duration (min): (90)  Number of Participants: 12  Program/Group: Partial Hospital Program  Topics Covered: (Process Group)  Participation: Active verbal participation, Attentive, Supportive to other group members. Donna seemed engaged and restless in group today. Offered a story but not a personal one. She seems reluctant to talk about herself.   Affect/Mood Range: Normal range  Affect/Mood Display: Congruent w/content, Anxious.   Cognition: Alert, Oriented  Evidence of Imminent Suicide Risk: No  Evidence of imminent homicide risk: No  Therapeutic Interventions: Emotion clarification, Cognitive clarification, Supportive psychotherapy  Progress Toward Treatment Goal: No change

## 2019-08-30 ENCOUNTER — HOSPITAL ENCOUNTER (OUTPATIENT)
Dept: BEHAVIORAL HEALTH | Facility: MEDICAL CENTER | Age: 39
End: 2019-08-30
Attending: PSYCHIATRY & NEUROLOGY
Payer: COMMERCIAL

## 2019-08-30 NOTE — BH THERAPY
Group Therapy Checklist  Attendance: Attended  Attendance Duration (min): (90)  Number of Participants: 10  Program/Group: Intensive Outpatient Program  Topics Covered: (Group Therapy)  Participation: Active verbal participation, Attentive, Supportive to other group members. Pt stated she continues to prepare to go IP to remain sober.  She values her day and hopes her health will stabilize well.   Affect/Mood Range: Flexible  Affect/Mood Display: Congruent w/content  Cognition: Oriented, Alert  Evidence of Imminent Suicide Risk: No  Evidence of imminent homicide risk: No  Therapeutic Interventions: Emotion clarification, Cognitive clarification  Progress Toward Treatment Goal: Moderate improvement

## 2019-08-30 NOTE — BH THERAPY
Group Therapy Checklist  Attendance: Attended  Attendance Duration (min): (60 min)  Number of Participants: 10  Program/Group: Intensive Outpatient Program  Topics Covered: ACT conept intro  Participation: Active verbal participation, Attentive  Affect/Mood Range: Flexible, Normal range  Affect/Mood Display: Congruent w/content  Cognition: Oriented, Alert  Evidence of Imminent Suicide Risk: No  Evidence of imminent homicide risk: No  Therapeutic Interventions: Emotion clarification, Cognitive clarification  Progress Toward Treatment Goal: Moderate improvement

## 2019-09-03 ENCOUNTER — HOSPITAL ENCOUNTER (OUTPATIENT)
Dept: BEHAVIORAL HEALTH | Facility: MEDICAL CENTER | Age: 39
End: 2019-09-03
Attending: PSYCHIATRY & NEUROLOGY
Payer: COMMERCIAL

## 2019-09-03 DIAGNOSIS — F10.20 ALCOHOL USE DISORDER, SEVERE, DEPENDENCE (HCC): ICD-10-CM

## 2019-09-03 DIAGNOSIS — F41.9 ANXIETY DISORDER, UNSPECIFIED TYPE: ICD-10-CM

## 2019-09-03 PROCEDURE — 90853 GROUP PSYCHOTHERAPY: CPT

## 2019-09-03 NOTE — BH THERAPY
Group Therapy Checklist  Attendance: Attended  Attendance Duration (min): (90)  Number of Participants: 12  Program/Group: Intensive Outpatient Program  Topics Covered: (Group Therapy)  Participation: Active verbal participation, Attentive. Pt had some good self care over the weekend and enjoyed herself in activities. She appears to remain sober.   Affect/Mood Range: Flexible  Affect/Mood Display: Congruent w/content  Cognition: Oriented, Alert  Evidence of Imminent Suicide Risk: No  Evidence of imminent homicide risk: No  Therapeutic Interventions: Emotion clarification, Cognitive clarification  Progress Toward Treatment Goal: Moderate improvement

## 2019-09-03 NOTE — BH THERAPY
Group Therapy Checklist  Attendance: Attended  Attendance Duration (min): (60 min)  Number of Participants: 13  Program/Group: Intensive Outpatient Program  Topics Covered: Assertiveness  Participation: Active verbal participation, Attentive  Affect/Mood Range: Normal range, Flexible  Affect/Mood Display: Congruent w/content  Cognition: Oriented, Alert  Evidence of Imminent Suicide Risk: No  Evidence of imminent homicide risk: No  Therapeutic Interventions: Emotion clarification, Cognitive clarification  Progress Toward Treatment Goal: Moderate improvement

## 2019-09-04 ENCOUNTER — HOSPITAL ENCOUNTER (OUTPATIENT)
Dept: BEHAVIORAL HEALTH | Facility: MEDICAL CENTER | Age: 39
End: 2019-09-04
Attending: PSYCHIATRY & NEUROLOGY
Payer: COMMERCIAL

## 2019-09-04 DIAGNOSIS — K70.10 ACUTE ALCOHOLIC HEPATITIS: ICD-10-CM

## 2019-09-04 DIAGNOSIS — F10.20 ALCOHOL USE DISORDER, SEVERE, DEPENDENCE (HCC): ICD-10-CM

## 2019-09-04 DIAGNOSIS — F41.9 ANXIETY DISORDER, UNSPECIFIED TYPE: ICD-10-CM

## 2019-09-04 PROCEDURE — 90853 GROUP PSYCHOTHERAPY: CPT

## 2019-09-04 NOTE — BH THERAPY
Group Therapy Checklist  Attendance: Attended  Attendance Duration (min): (90)  Number of Participants: 6  Program/Group: Intensive Outpatient Program  Topics Covered: (Group Therapy)  Participation: Active verbal participation, Attentive. Pt processed growing up w/ her parents as an only child and some grief and loss about her dad who  in May from complications from addiction.   Affect/Mood Range: Flexible  Affect/Mood Display: Congruent w/content, Sad, Tearful  Cognition: Oriented, Alert  Evidence of Imminent Suicide Risk: No  Evidence of imminent homicide risk: No  Therapeutic Interventions: Emotion clarification, Cognitive clarification  Progress Toward Treatment Goal: Moderate improvement

## 2019-09-04 NOTE — BH THERAPY
Group Therapy Checklist  Attendance: Attended  Attendance Duration (min): (60 min)  Number of Participants: 6  Program/Group: Intensive Outpatient Program  Topics Covered: Chalk talk  Participation: Active verbal participation, Attentive  Affect/Mood Range: Normal range, Flexible  Affect/Mood Display: Congruent w/content  Cognition: Oriented, Alert  Evidence of Imminent Suicide Risk: No  Evidence of imminent homicide risk: No  Therapeutic Interventions: Cognitive clarification  Progress Toward Treatment Goal: Moderate improvement

## 2019-09-05 ENCOUNTER — HOSPITAL ENCOUNTER (OUTPATIENT)
Dept: BEHAVIORAL HEALTH | Facility: MEDICAL CENTER | Age: 39
End: 2019-09-05
Attending: PSYCHIATRY & NEUROLOGY
Payer: COMMERCIAL

## 2019-09-05 DIAGNOSIS — F10.20 ACUTE ALCOHOLISM (HCC): ICD-10-CM

## 2019-09-05 PROCEDURE — 90853 GROUP PSYCHOTHERAPY: CPT | Performed by: MARRIAGE & FAMILY THERAPIST

## 2019-09-05 NOTE — BH THERAPY
Group Therapy Checklist  Attendance: Attended  Attendance Duration (min): (90)  Program/Group: Intensive Outpatient Program  Topics Covered: (Process Group)  Participation: Active verbal participation, Attentive, supportive of other group members. She spoke about how she hopes that she can live her life fulfilling her purpose even though she is still trying to figure what that is out.   Affect/Mood Range: Constricted  Affect/Mood Display: Congruent w/content  Cognition: Alert, Oriented  Evidence of Imminent Suicide Risk: No  Evidence of imminent homicide risk: No  Therapeutic Interventions: Emotion clarification, Cognitive clarification, Supportive psychotherapy  Progress Toward Treatment Goal: Mild improvement

## 2019-09-06 ENCOUNTER — TELEPHONE (OUTPATIENT)
Dept: BEHAVIORAL HEALTH | Facility: MEDICAL CENTER | Age: 39
End: 2019-09-06

## 2019-09-06 ENCOUNTER — APPOINTMENT (OUTPATIENT)
Dept: BEHAVIORAL HEALTH | Facility: MEDICAL CENTER | Age: 39
End: 2019-09-06
Attending: PSYCHIATRY & NEUROLOGY
Payer: COMMERCIAL

## 2019-09-06 NOTE — ADDENDUM NOTE
Encounter addended by: Esme Puente R.N. on: 9/6/2019 4:53 PM   Actions taken: Care Plan problems brought forward, Care Plan modified, Care Plan progress modified, Sign clinical note

## 2019-09-06 NOTE — TELEPHONE ENCOUNTER
[facility]  TRANSFER/DISCHARGE SUMMARY FORM    HHPI / SCP: no Other Ins.: PEBP     Patient Name: Donna Morales  Admission Date: 19  Level of Care Attended:  Intens.OP : 1980  Transfer/Discharge Date: MRN: [id number]  19       SIGNIFICANT FINDINGS/CLINICAL IMPRESSION:   DSM Codes:   IOP    ICD10 Codes:  F10.20, severe    Additional problems identified via assessment: severity necessitates a higher level of care.     Treatment Components in Which Patient Participated (check all that apply):  Education group(s), 1:1 teaching/therapy, Medication Management, Group Therapy and 12-Step Group(s)    Summary of Course of Treatment: Active participation all education forums, process groups and individual counseling sessions.     Condition at Time of Transfer/Discharge: Case administratively closed due to requiring higher level of care.    [] Medications Reviewed with Copy to Patient    Referred to: Refer to [facility]     Patient is in agreement with discharge plan: yes    Esme Puente R.N.

## 2019-09-06 NOTE — CARE PLAN
Transfer to higher level of care, may return to [facility] following inpatient and/or residential treatment.

## 2019-09-16 ENCOUNTER — TELEPHONE (OUTPATIENT)
Dept: BEHAVIORAL HEALTH | Facility: MEDICAL CENTER | Age: 39
End: 2019-09-16

## 2019-09-23 ENCOUNTER — TELEPHONE (OUTPATIENT)
Dept: BEHAVIORAL HEALTH | Facility: MEDICAL CENTER | Age: 39
End: 2019-09-23

## 2019-10-03 ENCOUNTER — HOSPITAL ENCOUNTER (OUTPATIENT)
Dept: BEHAVIORAL HEALTH | Facility: MEDICAL CENTER | Age: 39
End: 2019-10-03
Attending: PSYCHIATRY & NEUROLOGY
Payer: COMMERCIAL

## 2019-10-03 DIAGNOSIS — F10.20 ACUTE ALCOHOLISM (HCC): ICD-10-CM

## 2019-10-03 PROCEDURE — 90853 GROUP PSYCHOTHERAPY: CPT | Performed by: MARRIAGE & FAMILY THERAPIST

## 2019-10-03 NOTE — BH THERAPY
Group Therapy Checklist  Attendance: Attended  Attendance Duration (min): (60)  Number of Participants: 7  Program/Group: Intensive Outpatient Program  Topics Covered: Spirituality  Participation: Active verbal participation, Attentive  Affect/Mood Range: Flexible  Affect/Mood Display: Congruent w/content  Cognition: Oriented, Alert  Evidence of Imminent Suicide Risk: No  Evidence of imminent homicide risk: No  Therapeutic Interventions: Psychoeducation re: (Comment), Cognitive clarification  Progress Toward Treatment Goal: Moderate improvement

## 2019-10-03 NOTE — BH THERAPY
Group Therapy Checklist  Attendance: Attended  Attendance Duration (min): (90)  Number of Participants: 7  Program/Group: Intensive Outpatient Program  Topics Covered: (Process Group)  Participation: Active verbal participation, Attentive, Guarded/resistant. Donna talked about not really being able to feel her feelings yet.   Affect/Mood Range: Constricted  Affect/Mood Display: Congruent w/content  Cognition: Alert, Oriented  Evidence of Imminent Suicide Risk: No  Evidence of imminent homicide risk: No  Therapeutic Interventions: Emotion clarification, Cognitive clarification, Supportive psychotherapy  Progress Toward Treatment Goal: No change

## 2019-10-03 NOTE — CARE PLAN
[facility]  Therapy Progress Note    Patient Name: Donna Morales  Patient MRN: [id number]  Today's Date: 10/3/2019     Type of session:Individual psychotherapy  Length of session: 30 minutes  Persons in attendance:Patient    Subjective/New Info: Donna returns from inpatient addictive disorders rehab program at [facility] where she was treated from 9/13/19-10/2/19.She feels the program benefited her in many ways and allowed her to put her sobriety and recovery first, without distraction. She reports increase in mental clarity and feels more determined to continue treatment. Donna meets with [facility] today for their recommendation on continuing care. We processed treatment goals today to reinforce tools of recovery and work her discharge plan from . She is attending AA. She feels supported by her family and is staying with her mom and/or her BF, Artemio. She wants to return to her home but knows she has hidden alcohol and she wants her mom to go through the house with her first, before she is there on her own.      Objective/Observations:   Participation: Active verbal participation, Attentive, Engaged and Open to feedback   Grooming: Good, Casual and Neat   Cognition: Alert and Fully Oriented   Eye contact: Good   Mood: Euthymic   Affect: Flexible, Full range and Congruent with content   Thought process: Logical and Goal-directed   Speech: Rate within normal limits and Volume within normal limits   Other:     Diagnoses: alcohol use disorder, severe, in early remission; depression    Current risk:   SUICIDE: Low   Homicide: Low   Self-harm: Low   Relapse: Low   Other:    Safety Plan reviewed? Not Indicated   If evidence of imminent risk is present, intervention/plan:     Therapeutic Intervention(s): Clarify:  Clarify feelings and Clarify thoughts, Develop/modify treatment plan, Goal-setting, Positive behavior reinforced, Self-care skills and Supportive  psychotherapy    Treatment Goal(s)/Objective(s) addressed: reactivate treatment plan and reestablish goals for IOP and discharge, 12 step recovery     Progress toward Treatment Goals: Moderate improvement    Plan:  - Continue Intensive Outpatient Program  - Next appointment scheduled:  10/4/2019  - Patient is in agreement with the above plan:  YES    Esme Puente R.N.  10/3/2019

## 2019-10-04 ENCOUNTER — HOSPITAL ENCOUNTER (OUTPATIENT)
Dept: BEHAVIORAL HEALTH | Facility: MEDICAL CENTER | Age: 39
End: 2019-10-04
Attending: PSYCHIATRY & NEUROLOGY
Payer: COMMERCIAL

## 2019-10-04 DIAGNOSIS — F10.20 ALCOHOL USE DISORDER, SEVERE, DEPENDENCE (HCC): ICD-10-CM

## 2019-10-04 PROCEDURE — 90853 GROUP PSYCHOTHERAPY: CPT

## 2019-10-04 NOTE — BH THERAPY
Group Therapy Checklist  Attendance: Attended  Attendance Duration (min): (90)  Number of Participants: 12  Program/Group: Intensive Outpatient Program  Topics Covered: (Group Therapy)  Participation: Limited verbal participation, Attentive. Pt hanging onto her sobriety hour by hour as she counts them on her phone.   hour  Affect/Mood Range: Flexible  Affect/Mood Display: Congruent w/content  Cognition: Oriented, Alert  Evidence of Imminent Suicide Risk: No  Evidence of imminent homicide risk: No  Therapeutic Interventions: Emotion clarification, Cognitive clarification  Progress Toward Treatment Goal: Moderate improvement

## 2019-10-07 ENCOUNTER — HOSPITAL ENCOUNTER (OUTPATIENT)
Dept: BEHAVIORAL HEALTH | Facility: MEDICAL CENTER | Age: 39
End: 2019-10-07
Attending: PSYCHIATRY & NEUROLOGY
Payer: COMMERCIAL

## 2019-10-07 DIAGNOSIS — F10.20 ACUTE ALCOHOLISM (HCC): ICD-10-CM

## 2019-10-07 PROCEDURE — 90853 GROUP PSYCHOTHERAPY: CPT | Performed by: MARRIAGE & FAMILY THERAPIST

## 2019-10-07 NOTE — BH THERAPY
Group Therapy Checklist  Attendance: Attended  Attendance Duration (min): (90)  Number of Participants: 11  Program/Group: Intensive Outpatient Program  Topics Covered: (Process Group)  Participation: Active verbal participation, Attentive, Open to feedback  Affect/Mood Range: Normal range  Affect/Mood Display: Congruent w/content  Cognition: Oriented, Alert  Evidence of Imminent Suicide Risk: No  Evidence of imminent homicide risk: No  Therapeutic Interventions: Cognitive clarification, Emotion clarification, Supportive psychotherapy  Progress Toward Treatment Goal: Mild improvement

## 2019-10-08 ENCOUNTER — HOSPITAL ENCOUNTER (OUTPATIENT)
Dept: BEHAVIORAL HEALTH | Facility: MEDICAL CENTER | Age: 39
End: 2019-10-08
Attending: PSYCHIATRY & NEUROLOGY
Payer: COMMERCIAL

## 2019-10-08 DIAGNOSIS — F10.20 ALCOHOL USE DISORDER, SEVERE, DEPENDENCE (HCC): ICD-10-CM

## 2019-10-08 PROCEDURE — 90853 GROUP PSYCHOTHERAPY: CPT

## 2019-10-08 NOTE — BH THERAPY
Group Therapy Checklist  Attendance: Attended  Attendance Duration (min): (60)  Number of Participants: 7  Program/Group: Intensive Outpatient Program  Topics Covered: Detachment  Participation: Active verbal participation, Attentive  Affect/Mood Range: Normal range, Flexible  Affect/Mood Display: Congruent w/content  Cognition: Alert, Oriented  Evidence of Imminent Suicide Risk: No  Evidence of imminent homicide risk: No  Therapeutic Interventions: Cognitive clarification, Emotion clarification, Relapse prevention  Progress Toward Treatment Goal: Mild improvement

## 2019-10-08 NOTE — BH THERAPY
Group Therapy Checklist  Attendance: Attended  Attendance Duration (min): (90)  Program/Group: Intensive Outpatient Program  Topics Covered: (Group Therapy)  Participation: Active verbal participation, Attentive. Pt sated that she goes to AA and it makes her feel fresh and connected to the pain and that helps her not  and drink.  Affect/Mood Range: Flexible  Affect/Mood Display: Congruent w/content  Cognition: Oriented, Alert  Evidence of Imminent Suicide Risk: No  Evidence of imminent homicide risk: No  Therapeutic Interventions: Emotion clarification, Cognitive clarification  Progress Toward Treatment Goal: Moderate improvement

## 2019-10-09 ENCOUNTER — HOSPITAL ENCOUNTER (OUTPATIENT)
Dept: BEHAVIORAL HEALTH | Facility: MEDICAL CENTER | Age: 39
End: 2019-10-09
Attending: PSYCHIATRY & NEUROLOGY
Payer: COMMERCIAL

## 2019-10-09 DIAGNOSIS — F10.20 ALCOHOL USE DISORDER, SEVERE, DEPENDENCE (HCC): ICD-10-CM

## 2019-10-09 PROCEDURE — 90853 GROUP PSYCHOTHERAPY: CPT

## 2019-10-09 NOTE — BH THERAPY
Group Therapy Checklist  Attendance: Attended  Attendance Duration (min): (90)  Number of Participants: 12  Program/Group: Intensive Outpatient Program  Topics Covered: (Group THerapy)  Participation: Limited verbal participation, Attentive.  Pt shared AA meeting info with peers in group and expressed that she continues to attend and likes some women's meetings.   Affect/Mood Range: Flexible  Affect/Mood Display: Congruent w/content  Cognition: Oriented, Alert  Evidence of Imminent Suicide Risk: No  Evidence of imminent homicide risk: No  Therapeutic Interventions: Emotion clarification, Cognitive clarification  Progress Toward Treatment Goal: Moderate improvement

## 2019-10-09 NOTE — BH THERAPY
Group Therapy Checklist  Attendance: Attended  Attendance Duration (min): (60)  Number of Participants: 11  Program/Group: Intensive Outpatient Program  Topics Covered: Chalk talk  Participation: Attentive, Active verbal participation  Affect/Mood Range: Normal range, Flexible  Affect/Mood Display: Congruent w/content  Cognition: Alert, Oriented  Evidence of Imminent Suicide Risk: No  Evidence of imminent homicide risk: No  Therapeutic Interventions: Cognitive clarification, Relapse prevention  Progress Toward Treatment Goal: Mild improvement

## 2019-10-11 ENCOUNTER — HOSPITAL ENCOUNTER (OUTPATIENT)
Dept: BEHAVIORAL HEALTH | Facility: MEDICAL CENTER | Age: 39
End: 2019-10-11
Attending: PSYCHIATRY & NEUROLOGY
Payer: COMMERCIAL

## 2019-10-11 ENCOUNTER — TELEPHONE (OUTPATIENT)
Dept: BEHAVIORAL HEALTH | Facility: MEDICAL CENTER | Age: 39
End: 2019-10-11

## 2019-10-11 VITALS
HEART RATE: 84 BPM | HEIGHT: 63 IN | SYSTOLIC BLOOD PRESSURE: 104 MMHG | WEIGHT: 135 LBS | BODY MASS INDEX: 23.92 KG/M2 | DIASTOLIC BLOOD PRESSURE: 70 MMHG

## 2019-10-11 DIAGNOSIS — F41.9 ANXIETY DISORDER, UNSPECIFIED TYPE: ICD-10-CM

## 2019-10-11 DIAGNOSIS — F10.20 ALCOHOL USE DISORDER, SEVERE, DEPENDENCE (HCC): ICD-10-CM

## 2019-10-11 DIAGNOSIS — F51.04 CHRONIC INSOMNIA: ICD-10-CM

## 2019-10-11 PROBLEM — E51.9 VITAMIN B1 DEFICIENCY: Status: RESOLVED | Noted: 2019-03-01 | Resolved: 2019-10-11

## 2019-10-11 PROBLEM — E53.8 VITAMIN B12 DEFICIENCY: Status: RESOLVED | Noted: 2017-04-13 | Resolved: 2019-10-11

## 2019-10-11 PROCEDURE — 99214 OFFICE O/P EST MOD 30 MIN: CPT | Performed by: PSYCHIATRY & NEUROLOGY

## 2019-10-11 PROCEDURE — 90853 GROUP PSYCHOTHERAPY: CPT

## 2019-10-11 RX ORDER — NALTREXONE HYDROCHLORIDE 50 MG/1
50 TABLET, FILM COATED ORAL DAILY
Qty: 30 TAB | Refills: 1 | Status: SHIPPED | OUTPATIENT
Start: 2019-10-11 | End: 2019-11-14 | Stop reason: SDUPTHER

## 2019-10-11 RX ORDER — CHOLECALCIFEROL (VITAMIN D3) 25 MCG
TABLET,CHEWABLE ORAL
COMMUNITY
End: 2020-10-23

## 2019-10-11 RX ORDER — FLUOXETINE HYDROCHLORIDE 20 MG/1
20 CAPSULE ORAL DAILY
Qty: 30 CAP | Refills: 1 | Status: SHIPPED | OUTPATIENT
Start: 2019-10-11 | End: 2019-11-14 | Stop reason: SDUPTHER

## 2019-10-11 RX ORDER — HYDROXYZINE PAMOATE 50 MG/1
25 CAPSULE ORAL 4 TIMES DAILY
Refills: 0 | COMMUNITY
Start: 2019-10-02 | End: 2019-11-14 | Stop reason: SDUPTHER

## 2019-10-11 RX ORDER — TRAZODONE HYDROCHLORIDE 50 MG/1
50 TABLET ORAL
Refills: 0 | COMMUNITY
Start: 2019-10-02 | End: 2019-11-14

## 2019-10-11 ASSESSMENT — PATIENT HEALTH QUESTIONNAIRE - PHQ9: CLINICAL INTERPRETATION OF PHQ2 SCORE: 0

## 2019-10-11 NOTE — TELEPHONE ENCOUNTER
[facility]  TRANSFER/DISCHARGE SUMMARY FORM    HHPI / SCP: yes Other Ins.: no     Patient Name: Donna Morales  Admission Date: 10/3/19  Level of Care Attended:  Intens.OP : 1980  Transfer/Discharge Date: MRN: [id number]  10/11/19       SIGNIFICANT FINDINGS/CLINICAL IMPRESSION:   DSM Codes:   IOP    ICD10 Codes:  F10.20    Additional problems identified via assessment: return to work on Monday    Treatment Components in Which Patient Participated (check all that apply):  Education group(s), Group Therapy and 12-Step Group(s)    Summary of Course of Treatment: Active participation all education forums, process groups and medication management.       Condition at Time of Transfer/Discharge: Met treatment goals.    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     Patient is in agreement with discharge plan: yes    Esme Puente R.N.

## 2019-10-11 NOTE — PROGRESS NOTES
PSYCHIATRY FOLLOW-UP NOTE      Chief Complaint   Patient presents with   • Follow-Up     alcohol use disorder         History Of Present Illness:  Donna Morales is a 39 y.o. old female with hypertension, alcohol use disorder, anxiety disorder, chronic insomnia comes in today for follow up, was last seen 6 months ago.  She has been struggling with alcohol since her last visit with me.  She states that she was sober for a while but that her father passed away he also struggled with drug and alcohol addiction.  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  She also got into trouble at work before school started the session.  She has been working with [employer] "[employer]" and will likely be able to go back to her job in the next 1-2 weeks.  She has been sober for approximately 4 weeks and feels that this time is different than her daughter she is not but again it day to her sobriety.  She has good support from her mother, grandmother and her boyfriend.  She has been living with her boyfriend since she got discharged from [facility] as there is alcohol at her place.  She plans on going with her family and boyfriend tomorrow to her house to clean up any alcohol that is present.  She is also been going to regular AA meetings and will be starting evening intensive outpatient program at [facility] on Monday.  He states that he would have her hospitalization she was taken off Prozac but she is not sure why.  She is on Campral and Topamax for alcohol use disorder.  She received a shot of Vivitrol about 2 months ago but never received another shot it is currently not on oral naltrexone as well.  She is using melatonin, hydroxyzine and trazodone for sleep and is currently sleeping okay.  She denies constipation but has noticed some dry mouth from taking hydroxyzine 50 mg 4 times daily.  She is endorsing some anxiety about returning back to  work but denies any mood symptoms.  She denies having thoughts of wanting to hurt herself or others.    Social History:   She is currently in a relationship.  She has never been  and has no kids.  She lives alone in [place].  She has good support from her mother who lives close by.  She is employed full-time as a .    Substance Use:  Alcohol - Sober since 9/13/19  Nicotine - Denies  Illicit drugs - Denies    Past Medication Trials:  Ativan, Buspirone    Medications:  Current Outpatient Medications   Medication Sig Dispense Refill   • Cyanocobalamin (B-12) 1000 MCG Cap Take  by mouth.     • traZODone (DESYREL) 100 MG Tab Take 1 Tab by mouth at bedtime as needed for Sleep. 30 Tab 0   • topiramate (TOPAMAX) 100 MG Tab Take 1 Tab by mouth 2 times a day. 60 Tab 1   • acamprosate (CAMPRAL) 333 MG tablet Take 1 Tab by mouth 3 times a day. 90 Tab 1   • ferrous sulfate 325 (65 Fe) MG tablet Take 325 mg by mouth 2 Times a Day. Every 48 hours     • ibuprofen (MOTRIN) 200 MG Tab Take 600 mg by mouth every 6 hours as needed (TMJ).     • hydrOXYzine pamoate (VISTARIL) 50 MG Cap Take 50 mg by mouth 4 times a day.  0   • Melatonin 5 MG Cap Take 10 mg by mouth every bedtime.  0   • traZODone (DESYREL) 50 MG Tab Take 50 mg by mouth at bedtime as needed.  0   • FLUoxetine (PROZAC) 20 MG Cap Take 1 Cap by mouth every day. 30 Cap 1   • naltrexone (DEPADE) 50 MG Tab Take 1 Tab by mouth every day. 30 Tab 1   • COLLAGEN PO Take  by mouth.     • LO LOESTRIN FE 1 MG-10 MCG / 10 MCG Tab Take 1 Tab by mouth every day. 4 months on 1 month off  11   • lisinopril-hydrochlorothiazide (PRINZIDE, ZESTORETIC) 20-25 MG per tablet Take 1 Tab by mouth every day.       No current facility-administered medications for this encounter.        Review Of Systems:    Constitutional - Positive for fatigue  Respiratory - Negative for shortness of breath, cough  CVS - Negative for chest pain, palpitations  GI - Negative for nausea,  "vomiting, abdominal pain, diarrhea, constipation  Musculoskeletal - Negative for back pain  Neurological - Negative for headaches  Psychiatric - Positive for anxiety    Physical Examination:  Vital signs: /70   Pulse 84   Ht 1.6 m (5' 3\")   Wt 61.2 kg (135 lb)   BMI 23.91 kg/m²     Musculoskeletal: Normal gait. No abnormal movements.     Mental Status Evaluation:   General: Young black female, dressed in casual attire, good grooming and hygiene, in no apparent distress, calm and cooperative, good eye contact, no psychomotor agitation or retardation  Orientation: Alert and oriented to person, place and time  Recent and remote memory: Grossly intact  Attention span and concentration: Grossly intact  Speech: Spontaneous, normal rate, rhythm and tone  Thought Process: Linear, logical and goal directed  Thought Content: Denies suicidal or homicidal ideations, intent or plan  Perception: Denies auditory or visual hallucinations. No delusions noted  Associations: Intact  Language: Appropriate  Fund of knowledge and vocabulary: Grossly adequate  Mood: \"okay\"  Affect: Euthymic, mood congruent  Insight: Good  Judgment: Good    Depression screening:  Depression Screen (PHQ-2/PHQ-9) 1/18/2019 1/19/2019   PHQ-2 Total Score 0 0     Interpretation of PHQ-9 Total Score   Score Severity   1-4 No Depression   5-9 Mild Depression   10-14 Moderate Depression   15-19 Moderately Severe Depression   20-27 Severe Depression    Medical Records/Labs/Diagnostic Tests Reviewed:  [place]  records - appropriate refills, no abuse suspected      Impression:  1.  Alcohol use disorder, severe (sober since 9/13/19) - worsening   2.  Unspecified anxiety disorder - stable  3.  Vitamin B12 and B1 deficiency - resolved   4.  Chronic insomnia secondary to alcohol use - stable    Plan:  1.  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  2.  CBC and CMB ordered to follow-up on elevated liver enzymes and anemia.  She is also been referred to GI for possible esophageal varices that have encouraged her to follow-up with that referral.  3.  Restart Naltrexone 50 mg daily for alcohol use disorder.  4.  Continue Campral 333 mg 3 times daily and Topamax 100 mg twice daily for alcohol use disorder  5.  Restart Prozac 20 mg daily for depression and anxiety  6.  Continue Hydroxyzine 25 mg 4 times daily for anxiety  7.  Continue Trazodone 100 mg at bedtime and 50 mg at bedtime as needed for sleep  8.  Continue to maintain sobriety from alcohol and attend intensive outpatient program and AA meetings.  9.  Continue individual psychotherapy with Enedina Hairston LCSW    Return to clinic in 4 weeks or sooner if symptoms worsen    The proposed treatment plan was discussed with the patient who was provided the opportunity to ask questions and make suggestions regarding alternative treatment. Patient verbalized understanding and expressed agreement with the plan.     Sujey Dunbar M.D.  10/11/19    This note was created using voice recognition software (Dragon). The accuracy of the dictation is limited by the abilities of the software. I have reviewed the note prior to signing, however some errors in grammar and context are still possible. If you have any questions related to this note please do not hesitate to contact our office.

## 2019-10-11 NOTE — BH THERAPY
Group Therapy Checklist  Attendance Duration (min): (90)  Number of Participants: 12  Program/Group: Intensive Outpatient Program  Topics Covered: (Group Therapy)  Participation: Limited verbal participation, Attentive. Pt changed plans quickly and made today her last day and will be going to [facility] evening OP as she returns to work.  She has 30 days cle[place] nd sober which is huge for her.  Affect/Mood Range: Flexible  Affect/Mood Display: Congruent w/content  Cognition: Oriented, Alert  Evidence of Imminent Suicide Risk: No  Evidence of imminent homicide risk: No  Therapeutic Interventions: Emotion clarification, Cognitive clarification  Progress Toward Treatment Goal: Moderate improvement

## 2019-10-11 NOTE — BH THERAPY
Group Therapy Checklist  Attendance: Attended  Attendance Duration (min): (60)  Number of Participants: 11  Program/Group: Intensive Outpatient Program  Topics Covered: Belief Systems  Participation: Active verbal participation, Attentive  Affect/Mood Range: Normal range, Flexible  Affect/Mood Display: Congruent w/content  Cognition: Alert, Oriented  Evidence of Imminent Suicide Risk: No  Evidence of imminent homicide risk: No  Therapeutic Interventions: Cognitive clarification, Behavioral activation  Progress Toward Treatment Goal: Moderate improvement

## 2019-10-28 ENCOUNTER — TELEPHONE (OUTPATIENT)
Dept: BEHAVIORAL HEALTH | Facility: CLINIC | Age: 39
End: 2019-10-28

## 2019-10-31 ENCOUNTER — HOSPITAL ENCOUNTER (OUTPATIENT)
Dept: LAB | Facility: MEDICAL CENTER | Age: 39
End: 2019-10-31
Attending: FAMILY MEDICINE
Payer: COMMERCIAL

## 2019-10-31 LAB
BASOPHILS # BLD AUTO: 1.2 % (ref 0–1.8)
BASOPHILS # BLD: 0.07 K/UL (ref 0–0.12)
EOSINOPHIL # BLD AUTO: 0.23 K/UL (ref 0–0.51)
EOSINOPHIL NFR BLD: 4 % (ref 0–6.9)
ERYTHROCYTE [DISTWIDTH] IN BLOOD BY AUTOMATED COUNT: 51.8 FL (ref 35.9–50)
FERRITIN SERPL-MCNC: 17.5 NG/ML (ref 10–291)
HCT VFR BLD AUTO: 32.2 % (ref 37–47)
HGB BLD-MCNC: 9.9 G/DL (ref 12–16)
IMM GRANULOCYTES # BLD AUTO: 0.01 K/UL (ref 0–0.11)
IMM GRANULOCYTES NFR BLD AUTO: 0.2 % (ref 0–0.9)
IRON SATN MFR SERPL: 66 % (ref 15–55)
IRON SERPL-MCNC: 266 UG/DL (ref 40–170)
LYMPHOCYTES # BLD AUTO: 2.82 K/UL (ref 1–4.8)
LYMPHOCYTES NFR BLD: 48.5 % (ref 22–41)
MCH RBC QN AUTO: 28.9 PG (ref 27–33)
MCHC RBC AUTO-ENTMCNC: 30.7 G/DL (ref 33.6–35)
MCV RBC AUTO: 94.2 FL (ref 81.4–97.8)
MONOCYTES # BLD AUTO: 0.51 K/UL (ref 0–0.85)
MONOCYTES NFR BLD AUTO: 8.8 % (ref 0–13.4)
NEUTROPHILS # BLD AUTO: 2.18 K/UL (ref 2–7.15)
NEUTROPHILS NFR BLD: 37.3 % (ref 44–72)
NRBC # BLD AUTO: 0 K/UL
NRBC BLD-RTO: 0 /100 WBC
PLATELET # BLD AUTO: 246 K/UL (ref 164–446)
PMV BLD AUTO: 10.3 FL (ref 9–12.9)
RBC # BLD AUTO: 3.42 M/UL (ref 4.2–5.4)
TIBC SERPL-MCNC: 406 UG/DL (ref 250–450)
WBC # BLD AUTO: 5.8 K/UL (ref 4.8–10.8)

## 2019-10-31 PROCEDURE — 83550 IRON BINDING TEST: CPT

## 2019-10-31 PROCEDURE — 85025 COMPLETE CBC W/AUTO DIFF WBC: CPT

## 2019-10-31 PROCEDURE — 36415 COLL VENOUS BLD VENIPUNCTURE: CPT

## 2019-10-31 PROCEDURE — 82728 ASSAY OF FERRITIN: CPT

## 2019-10-31 PROCEDURE — 83540 ASSAY OF IRON: CPT

## 2019-11-05 ENCOUNTER — APPOINTMENT (OUTPATIENT)
Dept: BEHAVIORAL HEALTH | Facility: CLINIC | Age: 39
End: 2019-11-05

## 2019-11-14 ENCOUNTER — OFFICE VISIT (OUTPATIENT)
Dept: BEHAVIORAL HEALTH | Facility: CLINIC | Age: 39
End: 2019-11-14
Payer: COMMERCIAL

## 2019-11-14 VITALS
HEART RATE: 80 BPM | BODY MASS INDEX: 25.87 KG/M2 | HEIGHT: 63 IN | SYSTOLIC BLOOD PRESSURE: 104 MMHG | DIASTOLIC BLOOD PRESSURE: 73 MMHG | WEIGHT: 146 LBS

## 2019-11-14 DIAGNOSIS — F51.04 CHRONIC INSOMNIA: ICD-10-CM

## 2019-11-14 DIAGNOSIS — F10.20 ALCOHOL USE DISORDER, SEVERE, DEPENDENCE (HCC): ICD-10-CM

## 2019-11-14 DIAGNOSIS — F41.9 ANXIETY DISORDER, UNSPECIFIED TYPE: ICD-10-CM

## 2019-11-14 PROCEDURE — 99214 OFFICE O/P EST MOD 30 MIN: CPT | Performed by: PSYCHIATRY & NEUROLOGY

## 2019-11-14 RX ORDER — TRAZODONE HYDROCHLORIDE 100 MG/1
TABLET ORAL
Qty: 135 TAB | Refills: 0 | Status: SHIPPED | OUTPATIENT
Start: 2019-11-14 | End: 2020-02-19 | Stop reason: SDUPTHER

## 2019-11-14 RX ORDER — ACAMPROSATE CALCIUM 333 MG/1
333 TABLET, DELAYED RELEASE ORAL 3 TIMES DAILY
Qty: 270 TAB | Refills: 0 | Status: SHIPPED | OUTPATIENT
Start: 2019-11-14 | End: 2020-02-04 | Stop reason: SDUPTHER

## 2019-11-14 RX ORDER — FLUOXETINE HYDROCHLORIDE 20 MG/1
20 CAPSULE ORAL
Qty: 90 CAP | Refills: 0 | Status: SHIPPED | OUTPATIENT
Start: 2019-11-14 | End: 2020-02-04 | Stop reason: SDUPTHER

## 2019-11-14 RX ORDER — TOPIRAMATE 100 MG/1
100 TABLET, FILM COATED ORAL 2 TIMES DAILY
Qty: 180 TAB | Refills: 0 | Status: SHIPPED | OUTPATIENT
Start: 2019-11-14 | End: 2020-02-04 | Stop reason: SDUPTHER

## 2019-11-14 RX ORDER — NALTREXONE HYDROCHLORIDE 50 MG/1
50 TABLET, FILM COATED ORAL DAILY
Qty: 90 TAB | Refills: 0 | Status: SHIPPED | OUTPATIENT
Start: 2019-11-14 | End: 2020-02-04 | Stop reason: SDUPTHER

## 2019-11-14 RX ORDER — HYDROXYZINE PAMOATE 50 MG/1
50 CAPSULE ORAL 4 TIMES DAILY
Qty: 360 CAP | Refills: 0 | Status: SHIPPED | OUTPATIENT
Start: 2019-11-14 | End: 2020-02-04 | Stop reason: SDUPTHER

## 2019-11-14 RX ORDER — ACETAMINOPHEN AND CODEINE PHOSPHATE 120; 12 MG/5ML; MG/5ML
SOLUTION ORAL
Refills: 11 | COMMUNITY
Start: 2019-11-06 | End: 2020-05-13

## 2019-11-14 ASSESSMENT — PATIENT HEALTH QUESTIONNAIRE - PHQ9: CLINICAL INTERPRETATION OF PHQ2 SCORE: 0

## 2019-11-14 NOTE — PROGRESS NOTES
PSYCHIATRY FOLLOW-UP NOTE      Chief Complaint   Patient presents with   • Follow-Up     depression, alcohol use disorder         History Of Present Illness:  Donna Morales is a 39 y.o. old female with hypertension, alcohol use disorder, anxiety disorder, chronic insomnia comes in today for follow up, was last seen 4 weeks ago.  She continues to be sober from alcohol and has been feeling good about her sobriety.  She completed the intensive outpatient program and is going to AA meetings.  She is back to work for over 2 weeks and feels good to be back to working.  She has been compliant with all of her medications and endorses benefit.  She denies any problems with her sleep but has been noticing an increase in her appetite and is craving more sugar.  She is still living with her boyfriend and has not been able to go back to her home to get rid of all the alcohol that is there.  She continues to have good support from her mother.  She denies any current mood symptoms and anxiety has been doing relatively okay.  She denies having thoughts of wanting to hurt herself or others.    Social History:   She is currently in a relationship.  She has never been  and has no kids.  She lives alone in [place] but has been temporarily living with her boyfriend.  She has good support from her mother who lives close by.  She is employed full-time as a .    Substance Use:  Alcohol - Sober since 9/13/19  Nicotine - Denies  Illicit drugs - Denies    Past Medication Trials:  Ativan, Buspirone    Medications:  Current Outpatient Medications   Medication Sig Dispense Refill   • Cyanocobalamin (B-12) 1000 MCG Cap Take  by mouth.     • hydrOXYzine pamoate (VISTARIL) 50 MG Cap Take 50 mg by mouth 4 times a day.  0   • Melatonin 5 MG Cap Take 10 mg by mouth every bedtime.  0   • traZODone (DESYREL) 50 MG Tab Take 50 mg by mouth at bedtime as needed.  0   • FLUoxetine (PROZAC) 20 MG Cap Take 1 Cap by mouth  "every day. 30 Cap 1   • naltrexone (DEPADE) 50 MG Tab Take 1 Tab by mouth every day. 30 Tab 1   • traZODone (DESYREL) 100 MG Tab Take 1 Tab by mouth at bedtime as needed for Sleep. 30 Tab 0   • topiramate (TOPAMAX) 100 MG Tab Take 1 Tab by mouth 2 times a day. 60 Tab 1   • acamprosate (CAMPRAL) 333 MG tablet Take 1 Tab by mouth 3 times a day. 90 Tab 1   • ferrous sulfate 325 (65 Fe) MG tablet Take 325 mg by mouth 2 Times a Day. Every 48 hours     • COLLAGEN PO Take  by mouth.     • ibuprofen (MOTRIN) 200 MG Tab Take 600 mg by mouth every 6 hours as needed (TMJ).     • LO LOESTRIN FE 1 MG-10 MCG / 10 MCG Tab Take 1 Tab by mouth every day. 4 months on 1 month off  11   • lisinopril-hydrochlorothiazide (PRINZIDE, ZESTORETIC) 20-25 MG per tablet Take 1 Tab by mouth every day.       No current facility-administered medications for this visit.        Review Of Systems:    Constitutional - Positive for fatigue, itching  Respiratory - Negative for shortness of breath, cough  CVS - Negative for chest pain, palpitations  GI - Negative for nausea, vomiting, abdominal pain, diarrhea, constipation. Positive for abdominal bloating   Musculoskeletal - Negative for back pain  Neurological - Negative for headaches  Psychiatric - Positive for anxiety    Physical Examination:  Vital signs: /73   Pulse 80   Ht 1.6 m (5' 3\")   Wt 66.2 kg (146 lb)   BMI 25.86 kg/m²     Musculoskeletal: Normal gait. No abnormal movements.     Mental Status Evaluation:   General: Young black female, dressed in casual attire, good grooming and hygiene, in no apparent distress, calm and cooperative, good eye contact, no psychomotor agitation or retardation  Orientation: Alert and oriented to person, place and time  Recent and remote memory: Grossly intact  Attention span and concentration: Grossly intact  Speech: Spontaneous, normal rate, rhythm and tone  Thought Process: Linear, logical and goal directed  Thought Content: Denies suicidal or " "homicidal ideations, intent or plan  Perception: Denies auditory or visual hallucinations. No delusions noted  Associations: Intact  Language: Appropriate  Fund of knowledge and vocabulary: Grossly adequate  Mood: \"Doing okay\"  Affect: Euthymic, mood congruent  Insight: Good  Judgment: Good    Depression screening:  Depression Screen (PHQ-2/PHQ-9) 1/18/2019 1/19/2019 10/11/2019   PHQ-2 Total Score 0 0 -   PHQ-2 Total Score - - 0     Interpretation of PHQ-9 Total Score   Score Severity   1-4 No Depression   5-9 Mild Depression   10-14 Moderate Depression   15-19 Moderately Severe Depression   20-27 Severe Depression    Medical Records/Labs/Diagnostic Tests Reviewed:  [place]  records - appropriate refills, no abuse suspected      Impression:  1.  Alcohol use disorder, severe (sober since 9/13/19) -improving  2.  Unspecified anxiety disorder - stable  3.  Chronic insomnia secondary to alcohol use - stable    Plan:  1.  Continue Prozac 20 mg but switch dosing to bedtime for anxiety  2.  Continue Naltrexone 50 mg daily, Campral 333 mg 3 times daily and Topamax 100 mg twice daily for alcohol use disorder  3.  Continue Hydroxyzine 50 mg 3-4 times daily for anxiety  4.  Continue  Melatonin 10 mg, Trazodone 100 mg at bedtime and 50 mg at bedtime as needed for sleep  5.  Continue to maintain sobriety from alcohol and stay connected with AA sponsor and attend AA meetings.  6.  Continue individual psychotherapy with Enedina Hairston LCSW  7.  Provided her with aftercare group information at our clinic and encouraged her to try to come as often as she can    Return to clinic in 3 months or sooner if symptoms worsen    The proposed treatment plan was discussed with the patient who was provided the opportunity to ask questions and make suggestions regarding alternative treatment. Patient verbalized understanding and expressed agreement with the plan.     Sujey Dunbar M.D.  11/14/19    This note was created using voice recognition " software (Dragon). The accuracy of the dictation is limited by the abilities of the software. I have reviewed the note prior to signing, however some errors in grammar and context are still possible. If you have any questions related to this note please do not hesitate to contact our office.

## 2019-11-25 ENCOUNTER — OFFICE VISIT (OUTPATIENT)
Dept: BEHAVIORAL HEALTH | Facility: CLINIC | Age: 39
End: 2019-11-25
Payer: COMMERCIAL

## 2019-11-25 DIAGNOSIS — F10.20 ALCOHOL USE DISORDER, SEVERE, DEPENDENCE (HCC): ICD-10-CM

## 2019-11-25 PROCEDURE — 90834 PSYTX W PT 45 MINUTES: CPT | Performed by: SOCIAL WORKER

## 2019-11-26 NOTE — BH THERAPY
[facility]  Therapy Progress Note    Patient Name: Donna Morales  Patient MRN: [id number]  Today's Date: 11/26/2019     Type of session:Individual psychotherapy  Length of session: 45 minutes  Persons in attendance:Patient    Subjective/New Info: Pt states she is now 71 days sober.  Reflected on months prior to I/P tx where pt experienced multiple consequences from alcohol use but was not ready to change.  Pt is committed to abstinence, states she cannot drink moderately.  Now back at work, doing well.  Started to explore pt's relapse triggers.  She is attending AA, has sponsor.    Objective/Observations:   Participation: Active verbal participation   Grooming: Casual and Neat   Cognition: Fully Oriented   Eye contact: Good   Mood: Euthymic   Affect: Congruent with content   Thought process: Goal-directed   Speech: Rate within normal limits and Volume within normal limits   Other:     Diagnoses:   1. Alcohol use disorder, severe, dependence (HCC)         Current risk:   SUICIDE: Low   Homicide: Low   Self-harm: Low   Relapse: Low   Other:    Safety Plan reviewed? Not Indicated   If evidence of imminent risk is present, intervention/plan:     Therapeutic Intervention(s): Cognitive modification, Positive behavior reinforced, Stressors assessed and Supportive psychotherapy    Treatment Goal(s)/Objective(s) addressed: Reviewed pt's plan for sobriety, relapse triggers     Progress toward Treatment Goals: Significant improvement    Plan:  - Continue Individual therapy and Medication management    Enedina Hairston L.C.S.W.  11/26/2019

## 2019-12-01 ENCOUNTER — HOSPITAL ENCOUNTER (OUTPATIENT)
Dept: RADIOLOGY | Facility: MEDICAL CENTER | Age: 39
End: 2019-12-01
Attending: FAMILY MEDICINE
Payer: COMMERCIAL

## 2019-12-01 DIAGNOSIS — F10.229: ICD-10-CM

## 2019-12-01 DIAGNOSIS — R94.5 NONSPECIFIC ABNORMAL RESULTS OF LIVER FUNCTION STUDY: ICD-10-CM

## 2019-12-01 PROCEDURE — 76705 ECHO EXAM OF ABDOMEN: CPT

## 2020-02-04 DIAGNOSIS — F10.20 ALCOHOL USE DISORDER, SEVERE, DEPENDENCE (HCC): ICD-10-CM

## 2020-02-04 RX ORDER — NALTREXONE HYDROCHLORIDE 50 MG/1
50 TABLET, FILM COATED ORAL DAILY
Qty: 30 TAB | Refills: 0 | Status: SHIPPED | OUTPATIENT
Start: 2020-02-04 | End: 2020-02-19 | Stop reason: SDUPTHER

## 2020-02-04 RX ORDER — ACAMPROSATE CALCIUM 333 MG/1
333 TABLET, DELAYED RELEASE ORAL 3 TIMES DAILY
Qty: 90 TAB | Refills: 0 | Status: SHIPPED | OUTPATIENT
Start: 2020-02-04 | End: 2020-02-19 | Stop reason: SDUPTHER

## 2020-02-04 RX ORDER — TOPIRAMATE 100 MG/1
100 TABLET, FILM COATED ORAL 2 TIMES DAILY
Qty: 60 TAB | Refills: 0 | Status: SHIPPED | OUTPATIENT
Start: 2020-02-04 | End: 2020-02-19 | Stop reason: SDUPTHER

## 2020-02-04 RX ORDER — HYDROXYZINE PAMOATE 50 MG/1
50 CAPSULE ORAL 4 TIMES DAILY
Qty: 120 CAP | Refills: 0 | Status: SHIPPED | OUTPATIENT
Start: 2020-02-04 | End: 2020-07-23 | Stop reason: SDUPTHER

## 2020-02-04 RX ORDER — FLUOXETINE HYDROCHLORIDE 20 MG/1
20 CAPSULE ORAL
Qty: 30 CAP | Refills: 0 | Status: SHIPPED | OUTPATIENT
Start: 2020-02-04 | End: 2020-02-19 | Stop reason: SDUPTHER

## 2020-02-04 NOTE — TELEPHONE ENCOUNTER
Received request via: Patient    Was the patient seen in the last year in this department? Yes    Does the patient have an active prescription (recently filled or refills available) for medication(s) requested? No

## 2020-02-19 ENCOUNTER — OFFICE VISIT (OUTPATIENT)
Dept: BEHAVIORAL HEALTH | Facility: CLINIC | Age: 40
End: 2020-02-19
Payer: COMMERCIAL

## 2020-02-19 VITALS
HEIGHT: 63 IN | SYSTOLIC BLOOD PRESSURE: 151 MMHG | WEIGHT: 142 LBS | BODY MASS INDEX: 25.16 KG/M2 | DIASTOLIC BLOOD PRESSURE: 100 MMHG | HEART RATE: 86 BPM

## 2020-02-19 DIAGNOSIS — F41.9 ANXIETY DISORDER, UNSPECIFIED TYPE: ICD-10-CM

## 2020-02-19 DIAGNOSIS — F51.04 CHRONIC INSOMNIA: ICD-10-CM

## 2020-02-19 DIAGNOSIS — F10.20 ALCOHOL USE DISORDER, SEVERE, DEPENDENCE (HCC): ICD-10-CM

## 2020-02-19 PROCEDURE — 99214 OFFICE O/P EST MOD 30 MIN: CPT | Performed by: PSYCHIATRY & NEUROLOGY

## 2020-02-19 PROCEDURE — 90833 PSYTX W PT W E/M 30 MIN: CPT | Performed by: PSYCHIATRY & NEUROLOGY

## 2020-02-19 RX ORDER — FLUOXETINE HYDROCHLORIDE 20 MG/1
20 CAPSULE ORAL
Qty: 90 CAP | Refills: 0 | Status: SHIPPED | OUTPATIENT
Start: 2020-02-19 | End: 2020-05-13 | Stop reason: SDUPTHER

## 2020-02-19 RX ORDER — TOPIRAMATE 100 MG/1
100 TABLET, FILM COATED ORAL 2 TIMES DAILY
Qty: 180 TAB | Refills: 0 | Status: SHIPPED | OUTPATIENT
Start: 2020-02-19 | End: 2020-05-13 | Stop reason: SDUPTHER

## 2020-02-19 RX ORDER — TRAZODONE HYDROCHLORIDE 100 MG/1
100 TABLET ORAL NIGHTLY PRN
Qty: 90 TAB | Refills: 0 | Status: SHIPPED | OUTPATIENT
Start: 2020-02-19 | End: 2020-05-13 | Stop reason: SDUPTHER

## 2020-02-19 RX ORDER — ACAMPROSATE CALCIUM 333 MG/1
333 TABLET, DELAYED RELEASE ORAL 3 TIMES DAILY
Qty: 270 TAB | Refills: 0 | Status: SHIPPED | OUTPATIENT
Start: 2020-02-19 | End: 2020-05-13 | Stop reason: SDUPTHER

## 2020-02-19 RX ORDER — NALTREXONE HYDROCHLORIDE 50 MG/1
50 TABLET, FILM COATED ORAL DAILY
Qty: 90 TAB | Refills: 0 | Status: SHIPPED | OUTPATIENT
Start: 2020-02-19 | End: 2020-05-13

## 2020-02-19 ASSESSMENT — PATIENT HEALTH QUESTIONNAIRE - PHQ9: CLINICAL INTERPRETATION OF PHQ2 SCORE: 0

## 2020-02-20 NOTE — PROGRESS NOTES
PSYCHIATRY FOLLOW-UP NOTE      Chief Complaint   Patient presents with   • Follow-Up     alcohol use disorder, anxiety, insomnia         History Of Present Illness:  Donna Morales is a 39 y.o. old female with hypertension, alcohol use disorder, anxiety disorder, chronic insomnia comes in today for follow up, was last seen 3 months ago.  She continues to have ups and downs in regards to her alcohol consumption.  She has had some periods of time where she was not drinking but then has a relapse that can last for a few days.  She relapsed during holidays and then was able to stay sober for a few months.  However, she relapsed again and she last had alcohol last week on Valentine's Day.  She feels disheartened and disappointed with her frequent relapses.  She recently saw a therapist through the [facility] and has an option of seeing her on a weekly basis.  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  She can be breathalyzed randomly at work and she does not like that.  She also does not like her family talking constantly about her alcohol and wants her to talk about her issues only in therapy.  She does have good support from her boyfriend and does better when she is with him.  However, she is back home and does not want to move in with him.  She has been trying to improve compliance with all of her psychiatric medications.  She saw her PCP last week and was prescribed Antabuse but she has not started it yet.  She has a hard time staying compliant with her medications during the weekdays but does better on the weekends.  She has been sleeping good with a combination of melatonin and trazodone.  She is cut back on her hydroxyzine to 2-3 times daily for anxiety.  She does feel that her anxiety is doing all right when she is not drinking.  She denies having thoughts of wanting to hurt herself or others.    Social History:   She is currently  "in a relationship.  She has never been  and has no kids.  She lives alone in [place].  She has good support from her mother who lives in [place] as well.  She is employed full-time as a .    Substance Use:  Alcohol - Last alcohol use 2/14/2020  Nicotine - Denies  Illicit drugs - Denies    Past Medication Trials:  Ativan, Buspirone    Medications:  Current Outpatient Medications   Medication Sig Dispense Refill   • FLUoxetine (PROZAC) 20 MG Cap Take 1 Cap by mouth every bedtime. 90 Cap 0   • acamprosate (CAMPRAL) 333 MG tablet Take 1 Tab by mouth 3 times a day. 270 Tab 0   • naltrexone (DEPADE) 50 MG Tab Take 1 Tab by mouth every day. 90 Tab 0   • topiramate (TOPAMAX) 100 MG Tab Take 1 Tab by mouth 2 times a day. 180 Tab 0   • traZODone (DESYREL) 100 MG Tab Take 1 Tab by mouth at bedtime as needed for Sleep. 90 Tab 0   • hydrOXYzine pamoate (VISTARIL) 50 MG Cap Take 1 Cap by mouth 4 times a day. 120 Cap 0   • norethindrone (MICRONOR) 0.35 MG tablet TK 1 T PO QD  11   • Cyanocobalamin (B-12) 1000 MCG Cap Take  by mouth.     • Melatonin 5 MG Cap Take 10 mg by mouth every bedtime.  0   • ibuprofen (MOTRIN) 200 MG Tab Take 600 mg by mouth every 6 hours as needed (TMJ).       No current facility-administered medications for this visit.        Review Of Systems:    Constitutional - Positive for fatigue  Respiratory - Negative for shortness of breath, cough  CVS - Negative for chest pain, palpitations  GI - Negative for nausea, vomiting, abdominal pain, diarrhea, constipation. Positive for abdominal bloating   Musculoskeletal - Negative for back pain  Neurological - Negative for headaches  Psychiatric - Positive for anxiety, alcohol use    Physical Examination:  Vital signs: /100   Pulse 86   Ht 1.6 m (5' 3\")   Wt 64.4 kg (142 lb)   BMI 25.15 kg/m²     Musculoskeletal: Normal gait. No abnormal movements.     Mental Status Evaluation:   General: Young black female, dressed in casual attire, " "good grooming and hygiene, in no apparent distress, calm and cooperative, good eye contact, no psychomotor agitation or retardation  Orientation: Alert and oriented to person, place and time  Recent and remote memory: Grossly intact  Attention span and concentration: Grossly intact  Speech: Spontaneous, normal rate, rhythm and tone  Thought Process: Linear, logical and goal directed  Thought Content: Denies suicidal or homicidal ideations, intent or plan  Perception: Denies auditory or visual hallucinations. No delusions noted  Associations: Intact  Language: Appropriate  Fund of knowledge and vocabulary: Grossly adequate  Mood: \"okay\"  Affect: Euthymic, mood congruent  Insight: Good  Judgment: Good    Depression screening:  Depression Screen (PHQ-2/PHQ-9) 10/11/2019 11/14/2019 2/19/2020   PHQ-2 Total Score - - -   PHQ-2 Total Score 0 0 0     Interpretation of PHQ-9 Total Score   Score Severity   1-4 No Depression   5-9 Mild Depression   10-14 Moderate Depression   15-19 Moderately Severe Depression   20-27 Severe Depression    Medical Records/Labs/Diagnostic Tests Reviewed:  [place]  records - appropriate refills, no abuse suspected      Impression:  1.  Alcohol use disorder, severe (last alcohol use 2/14/2020) - worsening  2.  Unspecified anxiety disorder - stable  3.  Chronic insomnia secondary to alcohol use - stable    Plan:  1.  Continue Prozac 20 mg daily for anxiety  2.  Continue Naltrexone 50 mg daily, Campral 333 mg 3 times daily and Topamax 100 mg twice daily for alcohol use disorder.  She was prescribed Antabuse by her PCP last week and she has not been able to fill it from the pharmacy but plans on starting it soon.  She is aware that she cannot drink when she is taking Antabuse.  3.  Continue Hydroxyzine 50 mg but decrease frequency to 2-3 times daily as needed for anxiety  4.  Continue  Melatonin 10 mg, Trazodone 100 mg at bedtime as needed for sleep  5.  Continue to maintain sobriety from alcohol and " stay connected with AA sponsor and attend AA meetings.  6.  Continue individual psychotherapy with GOYO Crowe at [facility].  She does have a therapist in our clinic but she would benefit from seeing a therapist on a weekly basis which is hard to do with Enedina Hairston LCSW here.  I advised her to continue psychotherapy with Marbella Esquivel for now.  7.  I have encouraged her to think about inpatient rehab option during the summer break and she is willing to think about it  8.  Her blood pressure was elevated today and she is off her antihypertensive medication.  I have asked her to check her blood pressure at home and if it is elevated to contact her PCP to get back on her medication.  9.  Provided supportive psychotherapy and psychoeducation (> 16 minutes) in regards to her alcohol use disorder and different treatment options    Return to clinic in 4-6 weeks or sooner if symptoms worsen    The proposed treatment plan was discussed with the patient who was provided the opportunity to ask questions and make suggestions regarding alternative treatment. Patient verbalized understanding and expressed agreement with the plan.     Sujey Dunbar M.D.  02/19/20    This note was created using voice recognition software (Dragon). The accuracy of the dictation is limited by the abilities of the software. I have reviewed the note prior to signing, however some errors in grammar and context are still possible. If you have any questions related to this note please do not hesitate to contact our office.

## 2020-03-02 ENCOUNTER — OFFICE VISIT (OUTPATIENT)
Dept: URGENT CARE | Facility: PHYSICIAN GROUP | Age: 40
End: 2020-03-02
Payer: COMMERCIAL

## 2020-03-02 ENCOUNTER — HOSPITAL ENCOUNTER (OUTPATIENT)
Dept: LAB | Facility: MEDICAL CENTER | Age: 40
End: 2020-03-02
Attending: FAMILY MEDICINE
Payer: COMMERCIAL

## 2020-03-02 VITALS
HEIGHT: 63 IN | SYSTOLIC BLOOD PRESSURE: 156 MMHG | HEART RATE: 109 BPM | WEIGHT: 142 LBS | BODY MASS INDEX: 25.16 KG/M2 | DIASTOLIC BLOOD PRESSURE: 84 MMHG | TEMPERATURE: 99.3 F | RESPIRATION RATE: 16 BRPM | OXYGEN SATURATION: 100 %

## 2020-03-02 DIAGNOSIS — R05.2 SUBACUTE COUGH: ICD-10-CM

## 2020-03-02 LAB
BASOPHILS # BLD AUTO: 1.6 % (ref 0–1.8)
BASOPHILS # BLD: 0.1 K/UL (ref 0–0.12)
EOSINOPHIL # BLD AUTO: 0.02 K/UL (ref 0–0.51)
EOSINOPHIL NFR BLD: 0.3 % (ref 0–6.9)
ERYTHROCYTE [DISTWIDTH] IN BLOOD BY AUTOMATED COUNT: 58.1 FL (ref 35.9–50)
HCT VFR BLD AUTO: 31.9 % (ref 37–47)
HGB BLD-MCNC: 10 G/DL (ref 12–16)
IMM GRANULOCYTES # BLD AUTO: 0.01 K/UL (ref 0–0.11)
IMM GRANULOCYTES NFR BLD AUTO: 0.2 % (ref 0–0.9)
LYMPHOCYTES # BLD AUTO: 2.29 K/UL (ref 1–4.8)
LYMPHOCYTES NFR BLD: 37.7 % (ref 22–41)
MCH RBC QN AUTO: 27.2 PG (ref 27–33)
MCHC RBC AUTO-ENTMCNC: 31.3 G/DL (ref 33.6–35)
MCV RBC AUTO: 86.9 FL (ref 81.4–97.8)
MONOCYTES # BLD AUTO: 0.38 K/UL (ref 0–0.85)
MONOCYTES NFR BLD AUTO: 6.3 % (ref 0–13.4)
NEUTROPHILS # BLD AUTO: 3.27 K/UL (ref 2–7.15)
NEUTROPHILS NFR BLD: 53.9 % (ref 44–72)
NRBC # BLD AUTO: 0 K/UL
NRBC BLD-RTO: 0 /100 WBC
PLATELET # BLD AUTO: 264 K/UL (ref 164–446)
PMV BLD AUTO: 9.4 FL (ref 9–12.9)
RBC # BLD AUTO: 3.67 M/UL (ref 4.2–5.4)
WBC # BLD AUTO: 6.1 K/UL (ref 4.8–10.8)

## 2020-03-02 PROCEDURE — 82728 ASSAY OF FERRITIN: CPT

## 2020-03-02 PROCEDURE — 80053 COMPREHEN METABOLIC PANEL: CPT

## 2020-03-02 PROCEDURE — 83540 ASSAY OF IRON: CPT

## 2020-03-02 PROCEDURE — 36415 COLL VENOUS BLD VENIPUNCTURE: CPT

## 2020-03-02 PROCEDURE — 84443 ASSAY THYROID STIM HORMONE: CPT

## 2020-03-02 PROCEDURE — 85025 COMPLETE CBC W/AUTO DIFF WBC: CPT

## 2020-03-02 PROCEDURE — 83550 IRON BINDING TEST: CPT

## 2020-03-02 PROCEDURE — 99214 OFFICE O/P EST MOD 30 MIN: CPT | Performed by: FAMILY MEDICINE

## 2020-03-02 PROCEDURE — 82607 VITAMIN B-12: CPT

## 2020-03-02 RX ORDER — DOXYCYCLINE HYCLATE 100 MG
100 TABLET ORAL 2 TIMES DAILY
Qty: 14 TAB | Refills: 0 | Status: SHIPPED | OUTPATIENT
Start: 2020-03-02 | End: 2020-03-09

## 2020-03-02 RX ORDER — BENZONATATE 200 MG/1
200 CAPSULE ORAL 3 TIMES DAILY PRN
Qty: 30 CAP | Refills: 0 | Status: SHIPPED | OUTPATIENT
Start: 2020-03-02 | End: 2020-05-13

## 2020-03-02 ASSESSMENT — ENCOUNTER SYMPTOMS
EYE DISCHARGE: 0
NAUSEA: 0
MYALGIAS: 0
WEIGHT LOSS: 0
VOMITING: 0
EYE REDNESS: 0

## 2020-03-02 ASSESSMENT — FIBROSIS 4 INDEX: FIB4 SCORE: 3.06

## 2020-03-02 NOTE — LETTER
March 2, 2020         Patient: Donna Morales   YOB: 1980   Date of Visit: 3/2/2020           To Whom it May Concern:    Donna Morales was seen in my clinic on 3/2/2020. Please excuse today.       Sincerely,           Cristobal Rodriguez M.D.  Electronically Signed

## 2020-03-02 NOTE — PROGRESS NOTES
"Subjective:      Donna oMrales is a 39 y.o. female who presents with Cough (congestion, scratchy throat, x 1 month)            1 month productive cough without blood in sputum.  Scratchy throat.  Nasal congestion. Myalgia. Fatigue.  No shortness of breath or wheezing.  No fever.  No PMH asthma/COPD/pneumonia.  Symptoms are moderate severity and persistent.  Worse at night.  Minimal relief with OTC medication.  No other aggravating or alleviating factors.      Review of Systems   Constitutional: Negative for malaise/fatigue and weight loss.   Eyes: Negative for discharge and redness.   Gastrointestinal: Negative for nausea and vomiting.   Musculoskeletal: Negative for joint pain and myalgias.   Skin: Negative for itching and rash.     .  Medications, Allergies, and current problem list reviewed today in Epic       Objective:     /84 (BP Location: Left arm, Patient Position: Sitting, BP Cuff Size: Adult)   Pulse (!) 109   Temp 37.4 °C (99.3 °F) (Temporal)   Resp 16   Ht 1.6 m (5' 3\")   Wt 64.4 kg (142 lb)   LMP 01/19/2020 (Approximate)   SpO2 100%   BMI 25.15 kg/m²      Physical Exam  Constitutional:       General: She is not in acute distress.     Appearance: She is well-developed.   HENT:      Head: Normocephalic and atraumatic.      Right Ear: Tympanic membrane normal.      Left Ear: Tympanic membrane normal.      Nose: Congestion present.      Mouth/Throat:      Mouth: Mucous membranes are moist.      Pharynx: Posterior oropharyngeal erythema present.   Eyes:      Conjunctiva/sclera: Conjunctivae normal.   Cardiovascular:      Rate and Rhythm: Normal rate and regular rhythm.      Heart sounds: Normal heart sounds. No murmur.   Pulmonary:      Effort: Pulmonary effort is normal.      Breath sounds: Normal breath sounds. No wheezing.   Skin:     General: Skin is warm and dry.      Findings: No rash.   Neurological:      Mental Status: She is alert and oriented to person, place, and time. "                 Assessment/Plan:     1. Subacute cough  doxycycline (VIBRAMYCIN) 100 MG Tab    benzonatate (TESSALON) 200 MG capsule     Differential diagnosis, natural history, supportive care, and indications for immediate follow-up discussed at length.

## 2020-03-03 LAB
ALBUMIN SERPL BCP-MCNC: 4.3 G/DL (ref 3.2–4.9)
ALBUMIN/GLOB SERPL: 1.3 G/DL
ALP SERPL-CCNC: 101 U/L (ref 30–99)
ALT SERPL-CCNC: 24 U/L (ref 2–50)
ANION GAP SERPL CALC-SCNC: 14 MMOL/L (ref 0–11.9)
AST SERPL-CCNC: 68 U/L (ref 12–45)
BILIRUB SERPL-MCNC: 0.4 MG/DL (ref 0.1–1.5)
BUN SERPL-MCNC: 18 MG/DL (ref 8–22)
CALCIUM SERPL-MCNC: 8.7 MG/DL (ref 8.5–10.5)
CHLORIDE SERPL-SCNC: 107 MMOL/L (ref 96–112)
CO2 SERPL-SCNC: 18 MMOL/L (ref 20–33)
CREAT SERPL-MCNC: 1.18 MG/DL (ref 0.5–1.4)
FASTING STATUS PATIENT QL REPORTED: NORMAL
FERRITIN SERPL-MCNC: 17.5 NG/ML (ref 10–291)
GLOBULIN SER CALC-MCNC: 3.2 G/DL (ref 1.9–3.5)
GLUCOSE SERPL-MCNC: 141 MG/DL (ref 65–99)
IRON SATN MFR SERPL: 14 % (ref 15–55)
IRON SERPL-MCNC: 75 UG/DL (ref 40–170)
POTASSIUM SERPL-SCNC: 4.3 MMOL/L (ref 3.6–5.5)
PROT SERPL-MCNC: 7.5 G/DL (ref 6–8.2)
SODIUM SERPL-SCNC: 139 MMOL/L (ref 135–145)
TIBC SERPL-MCNC: 532 UG/DL (ref 250–450)
TSH SERPL DL<=0.005 MIU/L-ACNC: 1.88 UIU/ML (ref 0.38–5.33)
VIT B12 SERPL-MCNC: 839 PG/ML (ref 211–911)

## 2020-03-10 ENCOUNTER — HOSPITAL ENCOUNTER (EMERGENCY)
Facility: MEDICAL CENTER | Age: 40
End: 2020-03-10
Attending: EMERGENCY MEDICINE
Payer: COMMERCIAL

## 2020-03-10 ENCOUNTER — OFFICE VISIT (OUTPATIENT)
Dept: URGENT CARE | Facility: PHYSICIAN GROUP | Age: 40
End: 2020-03-10
Payer: COMMERCIAL

## 2020-03-10 VITALS
HEART RATE: 78 BPM | RESPIRATION RATE: 16 BRPM | TEMPERATURE: 98 F | OXYGEN SATURATION: 99 % | SYSTOLIC BLOOD PRESSURE: 124 MMHG | DIASTOLIC BLOOD PRESSURE: 86 MMHG | HEIGHT: 63 IN | BODY MASS INDEX: 24.84 KG/M2 | WEIGHT: 140.2 LBS

## 2020-03-10 VITALS
HEART RATE: 77 BPM | BODY MASS INDEX: 24.92 KG/M2 | HEIGHT: 63 IN | TEMPERATURE: 98.4 F | DIASTOLIC BLOOD PRESSURE: 56 MMHG | RESPIRATION RATE: 16 BRPM | WEIGHT: 140.65 LBS | OXYGEN SATURATION: 98 % | SYSTOLIC BLOOD PRESSURE: 97 MMHG

## 2020-03-10 DIAGNOSIS — F10.920 ACUTE ALCOHOLIC INTOXICATION WITHOUT COMPLICATION (HCC): ICD-10-CM

## 2020-03-10 DIAGNOSIS — R10.10 UPPER ABDOMINAL PAIN: ICD-10-CM

## 2020-03-10 DIAGNOSIS — R10.84 GENERALIZED ABDOMINAL CRAMPING: ICD-10-CM

## 2020-03-10 DIAGNOSIS — F10.20 ALCOHOLISM (HCC): ICD-10-CM

## 2020-03-10 LAB
ALBUMIN SERPL BCP-MCNC: 4.9 G/DL (ref 3.2–4.9)
ALBUMIN/GLOB SERPL: 1.1 G/DL
ALP SERPL-CCNC: 117 U/L (ref 30–99)
ALT SERPL-CCNC: 28 U/L (ref 2–50)
ANION GAP SERPL CALC-SCNC: 14 MMOL/L (ref 0–11.9)
APPEARANCE UR: CLEAR
AST SERPL-CCNC: 88 U/L (ref 12–45)
BACTERIA #/AREA URNS HPF: ABNORMAL /HPF
BASOPHILS # BLD AUTO: 2.4 % (ref 0–1.8)
BASOPHILS # BLD: 0.1 K/UL (ref 0–0.12)
BILIRUB SERPL-MCNC: 0.4 MG/DL (ref 0.1–1.5)
BILIRUB UR QL STRIP.AUTO: NEGATIVE
BUN SERPL-MCNC: 17 MG/DL (ref 8–22)
CALCIUM SERPL-MCNC: 9.7 MG/DL (ref 8.5–10.5)
CHLORIDE SERPL-SCNC: 107 MMOL/L (ref 96–112)
CO2 SERPL-SCNC: 20 MMOL/L (ref 20–33)
COLOR UR: YELLOW
CREAT SERPL-MCNC: 1.17 MG/DL (ref 0.5–1.4)
EOSINOPHIL # BLD AUTO: 0.03 K/UL (ref 0–0.51)
EOSINOPHIL NFR BLD: 0.7 % (ref 0–6.9)
EPI CELLS #/AREA URNS HPF: NEGATIVE /HPF
ERYTHROCYTE [DISTWIDTH] IN BLOOD BY AUTOMATED COUNT: 56.2 FL (ref 35.9–50)
ETHANOL BLD-MCNC: 0.36 G/DL
GLOBULIN SER CALC-MCNC: 4.6 G/DL (ref 1.9–3.5)
GLUCOSE SERPL-MCNC: 88 MG/DL (ref 65–99)
GLUCOSE UR STRIP.AUTO-MCNC: NEGATIVE MG/DL
HCG SERPL QL: NEGATIVE
HCT VFR BLD AUTO: 36.4 % (ref 37–47)
HGB BLD-MCNC: 11.7 G/DL (ref 12–16)
HYALINE CASTS #/AREA URNS LPF: ABNORMAL /LPF
IMM GRANULOCYTES # BLD AUTO: 0 K/UL (ref 0–0.11)
IMM GRANULOCYTES NFR BLD AUTO: 0 % (ref 0–0.9)
KETONES UR STRIP.AUTO-MCNC: NEGATIVE MG/DL
LEUKOCYTE ESTERASE UR QL STRIP.AUTO: NEGATIVE
LIPASE SERPL-CCNC: <3 U/L (ref 11–82)
LYMPHOCYTES # BLD AUTO: 2.83 K/UL (ref 1–4.8)
LYMPHOCYTES NFR BLD: 67.2 % (ref 22–41)
MCH RBC QN AUTO: 27.5 PG (ref 27–33)
MCHC RBC AUTO-ENTMCNC: 32.1 G/DL (ref 33.6–35)
MCV RBC AUTO: 85.6 FL (ref 81.4–97.8)
MICRO URNS: ABNORMAL
MONOCYTES # BLD AUTO: 0.16 K/UL (ref 0–0.85)
MONOCYTES NFR BLD AUTO: 3.8 % (ref 0–13.4)
NEUTROPHILS # BLD AUTO: 1.09 K/UL (ref 2–7.15)
NEUTROPHILS NFR BLD: 25.9 % (ref 44–72)
NITRITE UR QL STRIP.AUTO: NEGATIVE
NRBC # BLD AUTO: 0 K/UL
NRBC BLD-RTO: 0 /100 WBC
PH UR STRIP.AUTO: 5.5 [PH] (ref 5–8)
PLATELET # BLD AUTO: 273 K/UL (ref 164–446)
PMV BLD AUTO: 9.3 FL (ref 9–12.9)
POTASSIUM SERPL-SCNC: 3.7 MMOL/L (ref 3.6–5.5)
PROT SERPL-MCNC: 9.5 G/DL (ref 6–8.2)
PROT UR QL STRIP: 300 MG/DL
RBC # BLD AUTO: 4.25 M/UL (ref 4.2–5.4)
RBC # URNS HPF: ABNORMAL /HPF
RBC UR QL AUTO: ABNORMAL
SODIUM SERPL-SCNC: 141 MMOL/L (ref 135–145)
SP GR UR STRIP.AUTO: 1.02
UROBILINOGEN UR STRIP.AUTO-MCNC: 0.2 MG/DL
WBC # BLD AUTO: 4.2 K/UL (ref 4.8–10.8)
WBC #/AREA URNS HPF: ABNORMAL /HPF

## 2020-03-10 PROCEDURE — 83690 ASSAY OF LIPASE: CPT

## 2020-03-10 PROCEDURE — 36415 COLL VENOUS BLD VENIPUNCTURE: CPT

## 2020-03-10 PROCEDURE — 81001 URINALYSIS AUTO W/SCOPE: CPT

## 2020-03-10 PROCEDURE — 84703 CHORIONIC GONADOTROPIN ASSAY: CPT

## 2020-03-10 PROCEDURE — 85025 COMPLETE CBC W/AUTO DIFF WBC: CPT

## 2020-03-10 PROCEDURE — 99214 OFFICE O/P EST MOD 30 MIN: CPT | Performed by: NURSE PRACTITIONER

## 2020-03-10 PROCEDURE — 80053 COMPREHEN METABOLIC PANEL: CPT

## 2020-03-10 PROCEDURE — 99284 EMERGENCY DEPT VISIT MOD MDM: CPT

## 2020-03-10 PROCEDURE — 80307 DRUG TEST PRSMV CHEM ANLYZR: CPT

## 2020-03-10 ASSESSMENT — FIBROSIS 4 INDEX
FIB4 SCORE: 2.1
FIB4 SCORE: 2.1

## 2020-03-10 ASSESSMENT — PAIN DESCRIPTION - DESCRIPTORS: DESCRIPTORS: ACHING

## 2020-03-10 NOTE — PROGRESS NOTES
Chief Complaint   Patient presents with   • Abdominal Pain     abdominal cramping,diarrhea,stomach issues going on for a while       HISTORY OF PRESENT ILLNESS: Patient is a 39 y.o. female with a history of alcoholism, hepatitis, renal failure, and alcohol withdrawal seizures who presents to urgent care today with her mother, both provide the history.  Patient admits to chronic right upper quadrant abdominal pain.  She has been seen by her PCP and GI for such.  Notes that she had an upper GI scope last week, she believes that she was told she had a hernia, no other findings were relayed to her.  Since then she has had generalized abdominal cramping, specifically right upper quadrant.  She believes that she had 1 day of fever, shortly after onset, but has not felt feverish since.  She denies any blood or black in her stools.  She does report frequent diarrhea.  She has not notified her GI provider of ongoing cramping since the scope.  Furthermore, she is concerned about her alcohol intake and potential for seizure with withdrawal.  She admits to history of such, without any warning, the last time she attempted to discontinue alcohol use.  Her last drink was this morning but she wants to discontinue her alcohol intake.  She denies any palpitations, tremors, nausea. She drinks approximately one quarter of a 750 mL bottle of vodka per day.  She has undergone several treatment programs and sees a psychiatrist regularly for her alcohol dependence.  She denies any suicidal or homicidal ideation.      Patient Active Problem List    Diagnosis Date Noted   • Renal failure 01/04/2014     Priority: High   • Rhabdomyolysis 01/04/2014     Priority: High   • High anion gap metabolic acidosis 01/04/2014     Priority: Medium   • Pancytopenia, acquired (HCC) 01/04/2014     Priority: Medium   • Acute alcoholic hepatitis 01/04/2014     Priority: Medium   • Hypocalcemia 01/18/2019   • Traumatic hematoma of head 01/18/2019   • Chronic  "insomnia 01/11/2018   • Anxiety disorder, unspecified 12/01/2016   • Alcohol use disorder, severe, dependence (HCC) 10/25/2016   • Anemia 02/15/2010   • Transaminitis 02/15/2010       Allergies:Patient has no known allergies.    Current Outpatient Medications Ordered in Epic   Medication Sig Dispense Refill   • benzonatate (TESSALON) 200 MG capsule Take 1 Cap by mouth 3 times a day as needed for Cough. (Patient not taking: Reported on 3/10/2020) 30 Cap 0   • FLUoxetine (PROZAC) 20 MG Cap Take 1 Cap by mouth every bedtime. 90 Cap 0   • acamprosate (CAMPRAL) 333 MG tablet Take 1 Tab by mouth 3 times a day. 270 Tab 0   • naltrexone (DEPADE) 50 MG Tab Take 1 Tab by mouth every day. 90 Tab 0   • topiramate (TOPAMAX) 100 MG Tab Take 1 Tab by mouth 2 times a day. 180 Tab 0   • traZODone (DESYREL) 100 MG Tab Take 1 Tab by mouth at bedtime as needed for Sleep. 90 Tab 0   • hydrOXYzine pamoate (VISTARIL) 50 MG Cap Take 1 Cap by mouth 4 times a day. 120 Cap 0   • norethindrone (MICRONOR) 0.35 MG tablet TK 1 T PO QD  11   • Cyanocobalamin (B-12) 1000 MCG Cap Take  by mouth.     • Melatonin 5 MG Cap Take 10 mg by mouth every bedtime.  0   • ibuprofen (MOTRIN) 200 MG Tab Take 600 mg by mouth every 6 hours as needed (TMJ).       No current Epic-ordered facility-administered medications on file.        Past Medical History:   Diagnosis Date   • Alcoholism (HCC)    • Anemia    • Anesthesia     \"woke up before I should have\"   • Anxiety    • Dialysis 2008   • Heart burn    • Hypertension    • Kidney failure 2008    dialysis, resolved \"too much tylenol\"   • Liver failure (HCC)    • Other specified symptom associated with female genital organs     endometriosis   • Pancreatitis    • Wrist pain        Social History     Tobacco Use   • Smoking status: Never Smoker   • Smokeless tobacco: Never Used   Substance Use Topics   • Alcohol use: Yes     Alcohol/week: 36.0 oz     Types: 60 Shots of liquor per week     Comment: last alcohol use " "2020   • Drug use: No       Family Status   Relation Name Status   • Fa   at age 63        cardiac arrest   • Mo  Alive   • OTHER  (Not Specified)   • PGFa  (Not Specified)   • PGMo  (Not Specified)   • PAunt  (Not Specified)     Family History   Problem Relation Age of Onset   • Alcohol abuse Father    • Drug abuse Father    • Diabetes Other    • Stroke Other    • Hypertension Other    • Alcohol abuse Paternal Grandfather    • Psychiatric Illness Paternal Grandmother         agoraphobia   • Alcohol abuse Paternal Grandmother    • Alcohol abuse Paternal Aunt    • Drug abuse Paternal Aunt        ROS:  Review of Systems   Constitutional: Positive for one day of tactile fever.  Negative for chills, weight loss, malaise, and fatigue.   HENT: Negative for ear pain, nosebleeds, congestion, sore throat and neck pain.    Eyes: Negative for vision changes.   Neuro: Negative for headache, sensory changes, weakness, seizure, LOC.   Cardiovascular: Negative for chest pain, palpitations, orthopnea and leg swelling.   Respiratory: Negative for cough, sputum production, shortness of breath and wheezing.   Gastrointestinal: Positive for abdominal pain, diarrhea.  Negative for nausea, vomiting.  Genitourinary: Negative for dysuria, urgency and frequency.  Musculoskeletal: Negative for falls, neck pain, back pain, joint pain, myalgias.   Skin: Negative for rash, diaphoresis.   Psych: Positive for frequent alcohol use.    Exam:  /86 (BP Location: Right arm, Patient Position: Sitting, BP Cuff Size: Adult)   Pulse 78   Temp 36.7 °C (98 °F) (Temporal)   Resp 16   Ht 1.6 m (5' 3\")   Wt 63.6 kg (140 lb 3.2 oz)   SpO2 99%   General: well-nourished, well-developed female in NAD  Head: normocephalic, atraumatic  Eyes: PERRLA, no conjunctival injection, acuity grossly intact, lids normal.  Ears: normal shape and symmetry, no tenderness, no discharge. External canals are without any significant edema or erythema. " Tympanic membranes are without any inflammation, no effusion. Gross auditory acuity is intact.  Nose: symmetrical without tenderness, no discharge.  Mouth/Throat: reasonable hygiene, no erythema, exudates or tonsillar enlargement.  Neck: no masses, range of motion within normal limits, no tracheal deviation. No obvious thyroid enlargement.   Lymph: no cervical adenopathy. No supraclavicular adenopathy.   Neuro: alert and oriented. Cranial nerves 1-12 grossly intact. No sensory deficit.   Cardiovascular: regular rate and rhythm. No edema.  Pulmonary: no distress. Chest is symmetrical with respiration, no wheezes, crackles, or rhonchi.   Abdomen: soft, right upper quadrant tenderness, no guarding, no hepatosplenomegaly.  Musculoskeletal: no clubbing, appropriate muscle tone, gait is stable.  Skin: warm, dry, intact, no clubbing, no cyanosis, no rashes.   Psych: Tearful, appropriate mood, affect, judgement.         Assessment/Plan:  1. Generalized abdominal cramping     2. Alcoholism (HCC)         The patient is a pleasant, well-appearing 39-year-old female presents the clinic today with complaints of generalized abdominal pain, specifically right upper quadrant, and concerns of alcohol withdrawal.  She does not show signs of alcohol withdrawal at this time, last drink was this morning, nevertheless she is concerned about such.  I have instructed the patient, specifically she has a history of alcohol related seizures, she should be withdrawing in a closely monitored facility such as an inpatient facility or as an inpatient.  Furthermore, regarding her abdominal pain, I have expressed my concerns about potential of recent GI scope and increase of abdominal pain.  At this time, I feel the patient requires a higher level care in the ED for closer monitoring, stat lab work, possible GI consult, seizure precautions, and/or imaging for further evaluation. This has been discussed with the patient and she states agreement and  understanding, patient's mother is also in agreement. The patient is stable to leave POV at this time and will go directly to ED without delay.         Please note that this dictation was created using voice recognition software. I have made every reasonable attempt to correct obvious errors, but I expect that there are errors of grammar and possibly content that I did not discover before finalizing the note.      LOGAN Gonzalez.

## 2020-03-10 NOTE — ED TRIAGE NOTES
Went to UC and sent here for further evaluation of abd related to alcohol abuse, had a scope last week, patient admits to drinking this am, denies SI.  No noted fevers, positive nausea, no vomiting.  Positive diarrhea, patient has not been taking her medications.

## 2020-03-10 NOTE — LETTER
March 10, 2020         Patient: Donna Morales   YOB: 1980   Date of Visit: 3/10/2020           To Whom it May Concern:    Donna Morales was seen in my clinic on 3/10/2020, please excuse her from work.     If you have any questions or concerns, please don't hesitate to call.        Sincerely,           LOGAN Gonzalez.  Electronically Signed

## 2020-03-11 NOTE — ED NOTES
Patient verbalized understanding of discharge instructions, provided with discharge paperwork, gait steady, ambulated independently to RODOLFO colbert.

## 2020-03-11 NOTE — ED PROVIDER NOTES
"ED Provider Note    CHIEF COMPLAINT  Chief Complaint   Patient presents with   • Abdominal Pain       HPI  Donna Morales is a 39 y.o. female who presents for evaluation of abdominal pain in the setting of alcohol abuse, chronic alcoholic.  She recently underwent endoscopy for upper abdominal pain in the setting of possible GI bleeding and alcohol abuse and states that they found her hernia and does not know what else was found on that scope.  She states that right now her abdominal pain is actually better than usual.  She went to urgent care where she was sent here with concerns over possible alcohol withdrawal.  She last drank this morning about 6 hours ago.  She has not been vomiting, no fever, she has no chest pain or shortness of breath.  She is status post cholecystectomy.  She has had pancreatitis in the past.  No other specific complaints offered at this time    REVIEW OF SYSTEMS  Negative for fever, rash, chest pain, dyspnea, headache, focal weakness, focal numbness, focal tingling, back pain. All other systems are negative.     PAST MEDICAL HISTORY  Past Medical History:   Diagnosis Date   • Alcoholism (HCC)    • Anemia    • Anesthesia     \"woke up before I should have\"   • Anxiety    • Dialysis 2008   • Heart burn    • Hypertension    • Kidney failure 2008    dialysis, resolved \"too much tylenol\"   • Liver failure (HCC)    • Other specified symptom associated with female genital organs     endometriosis   • Pancreatitis    • Wrist pain        FAMILY HISTORY  Family History   Problem Relation Age of Onset   • Alcohol abuse Father    • Drug abuse Father    • Diabetes Other    • Stroke Other    • Hypertension Other    • Alcohol abuse Paternal Grandfather    • Psychiatric Illness Paternal Grandmother         agoraphobia   • Alcohol abuse Paternal Grandmother    • Alcohol abuse Paternal Aunt    • Drug abuse Paternal Aunt        SOCIAL HISTORY  Social History     Tobacco Use   • Smoking status: Never " "Smoker   • Smokeless tobacco: Never Used   Substance Use Topics   • Alcohol use: Yes     Alcohol/week: 36.0 oz     Types: 60 Shots of liquor per week     Comment: last alcohol use 2/14/2020   • Drug use: No       SURGICAL HISTORY  Past Surgical History:   Procedure Laterality Date   • FLEXOR TENDON REPAIR  7/18/2012    Performed by MAI BRITO at [facility]   • AMANDA BY LAPAROSCOPY  2006   • LAPAROSCOPY  2000    endometriosis       CURRENT MEDICATIONS  I personally reviewed the medication list in the charting documentation.     ALLERGIES  No Known Allergies    MEDICAL RECORD  I have reviewed patient's medical record and pertinent results are listed above.      PHYSICAL EXAM  VITAL SIGNS: /82   Pulse 69   Temp 36.9 °C (98.4 °F) (Temporal)   Resp 16   Ht 1.6 m (5' 3\")   Wt 63.8 kg (140 lb 10.5 oz)   LMP 02/18/2020 (Approximate)   SpO2 99%   BMI 24.92 kg/m²    Constitutional: Generally well appearing patient in no acute distress.  Not toxic, nor ill in appearance.  HENT: Mucus membranes moist.    Eyes: No scleral icterus. Normal conjunctiva   Neck: Supple, comfortable, nonpainful range of motion.   Cardiovascular: Regular heart rate and rhythm.   Thorax & Lungs: Chest is nontender.  Lungs are clear to auscultation with good air movement bilaterally.  No wheeze, rhonchi, nor rales.   Abdomen: Soft, nondistended, minimal right upper quadrant tenderness but no rebound, no guarding  Skin: Warm, dry. No rash appreciated  Extremities/Musculoskeletal: No sign of trauma. No asymmetric calf tenderness, erythema or edema. Normal range of motion   Neurologic: Alert & oriented.  Slurred speech otherwise no focal deficits observed.   Psychiatric: Normal affect appropriate for the clinical situation.    DIAGNOSTIC STUDIES / PROCEDURES    LABS/EKGs  Results for orders placed or performed during the hospital encounter of 03/10/20   CBC WITH DIFFERENTIAL   Result Value Ref Range    WBC 4.2 (L) 4.8 - " 10.8 K/uL    RBC 4.25 4.20 - 5.40 M/uL    Hemoglobin 11.7 (L) 12.0 - 16.0 g/dL    Hematocrit 36.4 (L) 37.0 - 47.0 %    MCV 85.6 81.4 - 97.8 fL    MCH 27.5 27.0 - 33.0 pg    MCHC 32.1 (L) 33.6 - 35.0 g/dL    RDW 56.2 (H) 35.9 - 50.0 fL    Platelet Count 273 164 - 446 K/uL    MPV 9.3 9.0 - 12.9 fL    Neutrophils-Polys 25.90 (L) 44.00 - 72.00 %    Lymphocytes 67.20 (H) 22.00 - 41.00 %    Monocytes 3.80 0.00 - 13.40 %    Eosinophils 0.70 0.00 - 6.90 %    Basophils 2.40 (H) 0.00 - 1.80 %    Immature Granulocytes 0.00 0.00 - 0.90 %    Nucleated RBC 0.00 /100 WBC    Neutrophils (Absolute) 1.09 (L) 2.00 - 7.15 K/uL    Lymphs (Absolute) 2.83 1.00 - 4.80 K/uL    Monos (Absolute) 0.16 0.00 - 0.85 K/uL    Eos (Absolute) 0.03 0.00 - 0.51 K/uL    Baso (Absolute) 0.10 0.00 - 0.12 K/uL    Immature Granulocytes (abs) 0.00 0.00 - 0.11 K/uL    NRBC (Absolute) 0.00 K/uL   COMP METABOLIC PANEL   Result Value Ref Range    Sodium 141 135 - 145 mmol/L    Potassium 3.7 3.6 - 5.5 mmol/L    Chloride 107 96 - 112 mmol/L    Co2 20 20 - 33 mmol/L    Anion Gap 14.0 (H) 0.0 - 11.9    Glucose 88 65 - 99 mg/dL    Bun 17 8 - 22 mg/dL    Creatinine 1.17 0.50 - 1.40 mg/dL    Calcium 9.7 8.5 - 10.5 mg/dL    AST(SGOT) 88 (H) 12 - 45 U/L    ALT(SGPT) 28 2 - 50 U/L    Alkaline Phosphatase 117 (H) 30 - 99 U/L    Total Bilirubin 0.4 0.1 - 1.5 mg/dL    Albumin 4.9 3.2 - 4.9 g/dL    Total Protein 9.5 (H) 6.0 - 8.2 g/dL    Globulin 4.6 (H) 1.9 - 3.5 g/dL    A-G Ratio 1.1 g/dL   LIPASE   Result Value Ref Range    Lipase <3 (L) 11 - 82 U/L   HCG QUAL SERUM   Result Value Ref Range    Beta-Hcg Qualitative Serum Negative Negative   URINALYSIS,CULTURE IF INDICATED   Result Value Ref Range    Color Yellow     Character Clear     Specific Gravity 1.022 <1.035    Ph 5.5 5.0 - 8.0    Glucose Negative Negative mg/dL    Ketones Negative Negative mg/dL    Protein 300 (A) Negative mg/dL    Bilirubin Negative Negative    Urobilinogen, Urine 0.2 Negative    Nitrite Negative  Negative    Leukocyte Esterase Negative Negative    Occult Blood Trace (A) Negative    Micro Urine Req Microscopic    ESTIMATED GFR   Result Value Ref Range    GFR If African American >60 >60 mL/min/1.73 m 2    GFR If Non  51 (A) >60 mL/min/1.73 m 2   DIAGNOSTIC ALCOHOL   Result Value Ref Range    Diagnostic Alcohol 0.36 (H) 0.00 g/dL   URINE MICROSCOPIC (W/UA)   Result Value Ref Range    WBC 0-2 /hpf    RBC 0-2 /hpf    Bacteria Few (A) None /hpf    Epithelial Cells Negative /hpf    Hyaline Cast 0-2 /lpf          COURSE & MEDICAL DECISION MAKING  I have reviewed any medical record information, laboratory studies and radiographic results as noted above.  Differential diagnoses includes: Pancreatitis, hepatitis, biliary obstruction, alcohol intoxication, anemia, dehydration    Encounter Summary: This is a 39 y.o. female with upper abdominal pain is been chronic in the setting of alcohol abuse, sent here for further evaluation of this as well as for her alcohol abuse with history of alcohol withdrawal seizure.  Turns out the patient is not withdrawing from alcohol as her current alcohol level is 0.36.  With regard to abdominal pain she has no evidence of peritonitis, her blood work shows no significant changes from prior, she does have an expected elevation in the AST and continuing elevation in her alkaline phosphatase.  Her GFR is normal, greater than 60 as she is -American.  At this point she is under the care of GI in fact was scoped last week.  I do not feel as though any further evaluation is needed for her abdominal pain.  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 ------ the patient has been evaluated by her psychiatric evaluator provided with multiple sorts of resources for her  alcohol abuse, she states that she wants to go home and enter into an intensive inpatient program tomorrow morning.  Again her alcohol level is quite elevated, I did go over the signs and symptoms of withdrawal that she should be in tune to, but now I do not think it would be appropriate to prescribe her medication as I am concerned she could potentially mix that with alcohol, I went over strict return instructions she is stable and appropriate for discharge.      DISPOSITION: Discharged home in stable condition      FINAL IMPRESSION  1. Acute alcoholic intoxication without complication (HCC)    2. Upper abdominal pain           This dictation was created using voice recognition software. The accuracy of the dictation is limited to the abilities of the software. I expect there may be some errors of grammar and possibly content. The nursing notes were reviewed and certain aspects of this information were incorporated into this note.    Electronically signed by: Jose Castano M.D., 3/10/2020 6:15 PM

## 2020-03-11 NOTE — ED NOTES
Patient resting in bed, texting and watching tv. Denies concerns, needs at this time. Call bell within reach, aware of plan of care.

## 2020-03-11 NOTE — ED NOTES
Patient awake alert and oriented x 4, [place] 15, bed in low position, call light within reach, on room air, attached to cardiac monitor, unlabored breathing noted, no cough noted, interacts with staff, interactions noted as appropriate, eating dinner tray provided by hospital.

## 2020-03-11 NOTE — ED NOTES
Patient ambulatory independently with steady gate from triage to ED room. Patient has multiple complaints. States she would like to quit drinking. States she has mild abdominal discomfort with intermittent nausea. Patient is alert and oriented. Denies any trauma. States she was worked up by her primary last week for same.

## 2020-04-29 ENCOUNTER — APPOINTMENT (OUTPATIENT)
Dept: BEHAVIORAL HEALTH | Facility: CLINIC | Age: 40
End: 2020-04-29
Payer: COMMERCIAL

## 2020-05-13 ENCOUNTER — TELEMEDICINE (OUTPATIENT)
Dept: BEHAVIORAL HEALTH | Facility: CLINIC | Age: 40
End: 2020-05-13
Payer: COMMERCIAL

## 2020-05-13 VITALS — WEIGHT: 146 LBS | BODY MASS INDEX: 25.87 KG/M2 | HEIGHT: 63 IN

## 2020-05-13 DIAGNOSIS — F41.9 ANXIETY DISORDER, UNSPECIFIED TYPE: ICD-10-CM

## 2020-05-13 DIAGNOSIS — F10.20 ALCOHOL USE DISORDER, SEVERE, DEPENDENCE (HCC): ICD-10-CM

## 2020-05-13 DIAGNOSIS — F51.04 CHRONIC INSOMNIA: ICD-10-CM

## 2020-05-13 PROCEDURE — 99214 OFFICE O/P EST MOD 30 MIN: CPT | Mod: 95,CR | Performed by: PSYCHIATRY & NEUROLOGY

## 2020-05-13 RX ORDER — FLUOXETINE HYDROCHLORIDE 20 MG/1
20 CAPSULE ORAL
Qty: 90 CAP | Refills: 0 | Status: SHIPPED | OUTPATIENT
Start: 2020-05-13 | End: 2020-07-23 | Stop reason: SDUPTHER

## 2020-05-13 RX ORDER — ACAMPROSATE CALCIUM 333 MG/1
333 TABLET, DELAYED RELEASE ORAL 3 TIMES DAILY
Qty: 270 TAB | Refills: 0 | Status: SHIPPED | OUTPATIENT
Start: 2020-05-13 | End: 2020-07-23 | Stop reason: SDUPTHER

## 2020-05-13 RX ORDER — DISULFIRAM 250 MG/1
250 TABLET ORAL 2 TIMES DAILY
COMMUNITY
Start: 2020-03-30 | End: 2020-10-23

## 2020-05-13 RX ORDER — NORETHINDRONE ACETATE AND ETHINYL ESTRADIOL, ETHINYL ESTRADIOL AND FERROUS FUMARATE 1MG-10(24)
1 KIT ORAL DAILY
COMMUNITY
Start: 2020-04-03 | End: 2020-10-23

## 2020-05-13 RX ORDER — DISULFIRAM 500 MG/1
250 TABLET ORAL 2 TIMES DAILY
Qty: 90 TAB | Refills: 0 | Status: SHIPPED | OUTPATIENT
Start: 2020-05-13 | End: 2020-07-23

## 2020-05-13 RX ORDER — TOPIRAMATE 100 MG/1
100 TABLET, FILM COATED ORAL 2 TIMES DAILY
Qty: 180 TAB | Refills: 0 | Status: SHIPPED | OUTPATIENT
Start: 2020-05-13 | End: 2020-07-23

## 2020-05-13 RX ORDER — TRAZODONE HYDROCHLORIDE 100 MG/1
100 TABLET ORAL NIGHTLY PRN
Qty: 90 TAB | Refills: 0 | Status: SHIPPED | OUTPATIENT
Start: 2020-05-13 | End: 2020-07-23 | Stop reason: SDUPTHER

## 2020-05-13 ASSESSMENT — FIBROSIS 4 INDEX: FIB4 SCORE: 2.44

## 2020-05-13 NOTE — PROGRESS NOTES
PSYCHIATRY TELEMEDICINE FOLLOW-UP NOTE      Chief Complaint   Patient presents with   • Follow-Up     alcohol use, anxiety, insomnia     This encounter was conducted via Zoom .   Verbal consent was obtained. Patient's identity was verified.    History Of Present Illness:  Donna Morales is a 39 y.o. old female with hypertension, alcohol use disorder, anxiety disorder, chronic insomnia comes in today for follow up, was last seen 3 months ago.  She had relapsed back on alcohol in March and had a brief hospitalization at [facility] and has been sober for over 50 days now.  She is currently on a combination of Antabuse, Campral, naltrexone and Topamax for alcohol use disorder.  She is staying with her boyfriend which has been good for her as she normally relapses when she does not have a routine in her life.  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  She has signed up for summer school and is also keeping an option of going to an inpatient rehab option.  She is feeling good about her sobriety but has not been able to see her AA sponsor or going online meetings.  She was struggling with anxiety when she decided to quit her alcohol use at home and had significant withdrawal symptoms.  She is now feeling better in regards to both her anxiety and sleep.  She denies any mood symptoms.  She denies having thoughts of wanting to hurt herself or others.    Social History:   She is currently in a relationship and lives with her boyfriend.  She has never been  and has no kids.  She has good support from her mother [facility].  She is employed full-time as a .    Substance Use:  Alcohol - Last alcohol use mid March 2020  Nicotine - Denies  Illicit drugs - Denies    Past Medication Trials:  Ativan, Buspirone    Medications:  Current Outpatient Medications   Medication Sig Dispense Refill   • FLUoxetine (PROZAC) 20 MG Cap  "Take 1 Cap by mouth every bedtime. 90 Cap 0   • acamprosate (CAMPRAL) 333 MG tablet Take 1 Tab by mouth 3 times a day. 270 Tab 0   • naltrexone (DEPADE) 50 MG Tab Take 1 Tab by mouth every day. 90 Tab 0   • topiramate (TOPAMAX) 100 MG Tab Take 1 Tab by mouth 2 times a day. 180 Tab 0   • traZODone (DESYREL) 100 MG Tab Take 1 Tab by mouth at bedtime as needed for Sleep. 90 Tab 0   • hydrOXYzine pamoate (VISTARIL) 50 MG Cap Take 1 Cap by mouth 4 times a day. 120 Cap 0   • norethindrone (MICRONOR) 0.35 MG tablet TK 1 T PO QD  11   • Cyanocobalamin (B-12) 1000 MCG Cap Take  by mouth.     • Melatonin 5 MG Cap Take 10 mg by mouth every bedtime.  0   • ibuprofen (MOTRIN) 200 MG Tab Take 600 mg by mouth every 6 hours as needed (TMJ).       No current facility-administered medications for this visit.        Review Of Systems:    Constitutional - Positive for fatigue  Respiratory - Negative for shortness of breath, cough  CVS - Negative for chest pain, palpitations  GI - Negative for nausea, vomiting, abdominal pain, diarrhea. Positive for abdominal bloating, constipation  Musculoskeletal - Negative for back pain  Neurological - Negative for headaches  Psychiatric - Positive for anxiety, alcohol use    Physical Examination:  Vital signs: Ht 1.6 m (5' 3\")   Wt 66.2 kg (146 lb)   LMP  (LMP Unknown)   Breastfeeding No   BMI 25.86 kg/m²     Musculoskeletal: No abnormal movements.     Mental Status Evaluation:   General: Young black female, dressed in casual attire, good grooming and hygiene, in no apparent distress, calm and cooperative, good eye contact, no psychomotor agitation or retardation  Orientation: Alert and oriented to person, place and time  Recent and remote memory: Grossly intact  Attention span and concentration: Grossly intact  Speech: Spontaneous, normal rate, rhythm and tone  Thought Process: Linear, logical and goal directed  Thought Content: Denies suicidal or homicidal ideations, intent or " "plan  Perception: Denies auditory or visual hallucinations. No delusions noted  Associations: Intact  Language: Appropriate  Fund of knowledge and vocabulary: Grossly adequate  Mood: \"okay\"  Affect: Euthymic, mood congruent  Insight: Good  Judgment: Good    Depression screening:  Depression Screen (PHQ-2/PHQ-9) 10/11/2019 11/14/2019 2/19/2020   PHQ-2 Total Score - - -   PHQ-2 Total Score 0 0 0     Interpretation of PHQ-9 Total Score   Score Severity   1-4 No Depression   5-9 Mild Depression   10-14 Moderate Depression   15-19 Moderately Severe Depression   20-27 Severe Depression    Medical Records/Labs/Diagnostic Tests Reviewed:  [place]  records - no prescribed controlled medications found in the last 1 year      Impression:  1.  Alcohol use disorder, severe (last alcohol ~ mid March 2020) - stable  2.  Unspecified anxiety disorder - stable  3.  Chronic insomnia secondary to anxiety, alcohol use - stable    Plan:  1.  Continue Prozac 20 mg daily for anxiety  2.  Discontinue Naltrexone given elevated liver enzymes  3.  Continue Antabuse 250 mg twice daily, Campral 333 mg 3 times daily and Topamax 100 mg twice daily for alcohol use disorder.   3.  Continue Hydroxyzine 50 mg but decrease frequency to 2-3 times daily as needed for anxiety  4.  Continue  Melatonin 10 mg, Trazodone 100 mg at bedtime as needed for sleep  5.  Continue to maintain sobriety from alcohol.  Advised her to get connected with her sponsor and to do online AA meetings.  6.  Continue bi-monthly individual psychotherapy with GOYO Crowe at [facility].    7.  I have again advised her to think about inpatient rehab on her summer break.    Return to clinic in 4-6 weeks or sooner if symptoms worsen    The proposed treatment plan was discussed with the patient who was provided the opportunity to ask questions and make suggestions regarding alternative treatment. Patient verbalized understanding and expressed agreement with the plan. "     Sujey Dunbar M.D.  05/13/20    This note was created using voice recognition software (Dragon). The accuracy of the dictation is limited by the abilities of the software. I have reviewed the note prior to signing, however some errors in grammar and context are still possible. If you have any questions related to this note please do not hesitate to contact our office.

## 2020-07-23 ENCOUNTER — TELEMEDICINE (OUTPATIENT)
Dept: BEHAVIORAL HEALTH | Facility: CLINIC | Age: 40
End: 2020-07-23
Payer: COMMERCIAL

## 2020-07-23 VITALS — BODY MASS INDEX: 25.87 KG/M2 | HEIGHT: 63 IN | WEIGHT: 146 LBS

## 2020-07-23 DIAGNOSIS — F10.20 ALCOHOL USE DISORDER, SEVERE, DEPENDENCE (HCC): ICD-10-CM

## 2020-07-23 DIAGNOSIS — F41.9 ANXIETY DISORDER, UNSPECIFIED TYPE: ICD-10-CM

## 2020-07-23 DIAGNOSIS — F51.04 CHRONIC INSOMNIA: ICD-10-CM

## 2020-07-23 PROCEDURE — 99442 PR PHYSICIAN TELEPHONE EVALUATION 11-20 MIN: CPT | Mod: CR | Performed by: PSYCHIATRY & NEUROLOGY

## 2020-07-23 RX ORDER — FLUOXETINE HYDROCHLORIDE 20 MG/1
20 CAPSULE ORAL
Qty: 90 CAP | Refills: 0 | Status: SHIPPED | OUTPATIENT
Start: 2020-07-23 | End: 2020-10-02

## 2020-07-23 RX ORDER — GABAPENTIN 300 MG/1
300 CAPSULE ORAL 3 TIMES DAILY
Qty: 270 CAP | Refills: 0 | Status: SHIPPED | OUTPATIENT
Start: 2020-07-23 | End: 2020-10-23 | Stop reason: SDUPTHER

## 2020-07-23 RX ORDER — TRAZODONE HYDROCHLORIDE 100 MG/1
100 TABLET ORAL NIGHTLY PRN
Qty: 90 TAB | Refills: 0 | Status: SHIPPED | OUTPATIENT
Start: 2020-07-23 | End: 2020-10-23 | Stop reason: SDUPTHER

## 2020-07-23 RX ORDER — ACAMPROSATE CALCIUM 333 MG/1
333 TABLET, DELAYED RELEASE ORAL 3 TIMES DAILY
Qty: 270 TAB | Refills: 0 | Status: SHIPPED | OUTPATIENT
Start: 2020-07-23 | End: 2020-10-23 | Stop reason: SDUPTHER

## 2020-07-23 RX ORDER — HYDROXYZINE PAMOATE 50 MG/1
50 CAPSULE ORAL 2 TIMES DAILY PRN
Qty: 180 CAP | Refills: 0 | Status: SHIPPED | OUTPATIENT
Start: 2020-07-23 | End: 2020-10-23 | Stop reason: SDUPTHER

## 2020-07-23 ASSESSMENT — FIBROSIS 4 INDEX: FIB4 SCORE: 2.44

## 2020-07-23 NOTE — PROGRESS NOTES
Telephone Appointment Visit   As a means of avoiding spread of COVID-19, this visit is being conducted by telephone. This telephone visit was initiated by the patient and they verbally consented.    Time at start of call: 3:58 PM    Reason for Call:  Medication Follow-up and Symptom Follow-up    HPI:    She had a 9 day relapse on alcohol at the end of May after having an 88-day period of sobriety.  She does not think there were any known triggers for her relapse.  She isolated herself from her family members and drank alcohol for 9 days straight and was off her medications as well.  She has been having hard time getting her Antabuse refilled on a consistent basis.  She is no longer taking Naltrexone and is only taking Campral and Topamax for her alcohol use disorder.  She was no longer able to continue individual psychotherapy as that was set up through the school district.  She is having situational anxiety about returning back to school.  She has informed the school district about her decision that she does not want to go back to the school setting as she is taking care of her 82-year-old grandmother who recently had a stroke.  She is okay with the virtual learning and she is looking into her options.  She was also informed that she will be transitioning from 1st to  which is causing anxiety as well.  She has been compliant with Prozac and Hydroxyzine.  She has been doing all right in regards to her physical health.  Sleep has been all right with a combination of trazodone and melatonin and/or Advil PM.  She has gained over 10 pounds which she attributes to not being physically active and social isolation related to coronavirus.  She denies any symptoms of depression.  She denies having thoughts of wanting to hurt herself or others.    Labs / Images Reviewed:   [place]  - appropriate refills, no new controlled medication prescriptions since 3/2019    Assessment and Plan:     1. Alcohol use  disorder, severe, dependence (HCC) - relapsed x 9 days, sober since 7/2/2020  2. Anxiety disorder, unspecified type  3. Chronic insomnia    -Discontinue Topamax and start Neurontin 300 mg 3 times daily for alcohol use disorder.  She was advised to start with 300 mg at bedtime for 2 to 3 days and then doing 300 mg twice daily for 2 to 3 days and then eventually getting to the target dose of 300 mg 3 times daily.  -Continue Campral 330 mg 3 times daily and Antabuse 250 mg twice daily for alcohol use disorder.  She will reach out to different pharmacies in [facility] to see if she can get her Antabuse refilled.  -Continue Prozac 20 mg at bedtime for anxiety  -Continue trazodone 100 mg at bedtime for sleep  -Continue hydroxyzine 50 mg twice daily as needed for anxiety  -She has an intake appointment for intensive outpatient program next week and hopefully she can start the program as soon as possible.  She was encouraged to maintain sobriety from alcohol.  -CBC and CMP ordered to monitor platelets and liver function with alcohol and Antabuse use    Follow-up: 2-3 weks    Time at end of call: 4:16 PM  Total Time Spent: 11-20 minutes    Sujey Dunbar M.D.

## 2020-07-24 ENCOUNTER — HOSPITAL ENCOUNTER (OUTPATIENT)
Dept: LAB | Facility: MEDICAL CENTER | Age: 40
End: 2020-07-24
Attending: PSYCHIATRY & NEUROLOGY
Payer: COMMERCIAL

## 2020-07-24 DIAGNOSIS — F10.20 ALCOHOL USE DISORDER, SEVERE, DEPENDENCE (HCC): ICD-10-CM

## 2020-07-24 LAB
ALBUMIN SERPL BCP-MCNC: 4 G/DL (ref 3.2–4.9)
ALBUMIN/GLOB SERPL: 1.6 G/DL
ALP SERPL-CCNC: 88 U/L (ref 30–99)
ALT SERPL-CCNC: 14 U/L (ref 2–50)
ANION GAP SERPL CALC-SCNC: 13 MMOL/L (ref 7–16)
AST SERPL-CCNC: 23 U/L (ref 12–45)
BILIRUB SERPL-MCNC: 0.2 MG/DL (ref 0.1–1.5)
BUN SERPL-MCNC: 17 MG/DL (ref 8–22)
CALCIUM SERPL-MCNC: 8.8 MG/DL (ref 8.5–10.5)
CHLORIDE SERPL-SCNC: 104 MMOL/L (ref 96–112)
CO2 SERPL-SCNC: 21 MMOL/L (ref 20–33)
CREAT SERPL-MCNC: 1.21 MG/DL (ref 0.5–1.4)
ERYTHROCYTE [DISTWIDTH] IN BLOOD BY AUTOMATED COUNT: 54.3 FL (ref 35.9–50)
FASTING STATUS PATIENT QL REPORTED: NORMAL
GLOBULIN SER CALC-MCNC: 2.5 G/DL (ref 1.9–3.5)
GLUCOSE SERPL-MCNC: 69 MG/DL (ref 65–99)
HCT VFR BLD AUTO: 31.4 % (ref 37–47)
HGB BLD-MCNC: 9.8 G/DL (ref 12–16)
MCH RBC QN AUTO: 27.4 PG (ref 27–33)
MCHC RBC AUTO-ENTMCNC: 31.2 G/DL (ref 33.6–35)
MCV RBC AUTO: 87.7 FL (ref 81.4–97.8)
PLATELET # BLD AUTO: 333 K/UL (ref 164–446)
PMV BLD AUTO: 9.4 FL (ref 9–12.9)
POTASSIUM SERPL-SCNC: 4.9 MMOL/L (ref 3.6–5.5)
PROT SERPL-MCNC: 6.5 G/DL (ref 6–8.2)
RBC # BLD AUTO: 3.58 M/UL (ref 4.2–5.4)
SODIUM SERPL-SCNC: 138 MMOL/L (ref 135–145)
WBC # BLD AUTO: 6.5 K/UL (ref 4.8–10.8)

## 2020-07-24 PROCEDURE — 80053 COMPREHEN METABOLIC PANEL: CPT

## 2020-07-24 PROCEDURE — 85027 COMPLETE CBC AUTOMATED: CPT

## 2020-07-24 PROCEDURE — 36415 COLL VENOUS BLD VENIPUNCTURE: CPT

## 2020-07-29 ENCOUNTER — HOSPITAL ENCOUNTER (OUTPATIENT)
Dept: BEHAVIORAL HEALTH | Facility: MEDICAL CENTER | Age: 40
End: 2020-07-29
Attending: PSYCHIATRY & NEUROLOGY
Payer: COMMERCIAL

## 2020-07-29 DIAGNOSIS — F10.20 ALCOHOL USE DISORDER, SEVERE, DEPENDENCE (HCC): ICD-10-CM

## 2020-07-29 PROCEDURE — 90791 PSYCH DIAGNOSTIC EVALUATION: CPT
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
## 2020-07-30 ENCOUNTER — HOSPITAL ENCOUNTER (OUTPATIENT)
Dept: BEHAVIORAL HEALTH | Facility: MEDICAL CENTER | Age: 40
End: 2020-07-30
Attending: PSYCHIATRY & NEUROLOGY
Payer: COMMERCIAL

## 2020-07-30 DIAGNOSIS — F10.20 ALCOHOL USE DISORDER, SEVERE, DEPENDENCE (HCC): ICD-10-CM

## 2020-07-30 PROCEDURE — 90853 GROUP PSYCHOTHERAPY: CPT

## 2020-07-30 NOTE — BH THERAPY
Group Therapy Checklist  Attendance: Attended  Attendance Duration (min): (60)  Number of Participants: 3  Program/Group: Intensive Outpatient Program  Topics Covered: Cognitive distortions  Participation: Active verbal participation, Attentive  Affect/Mood Range: Flexible, Normal range  Affect/Mood Display: Congruent w/content  Cognition: Alert, Oriented  Evidence of Imminent Suicide Risk: No  Evidence of imminent homicide risk: No  Therapeutic Interventions: Cognitive clarification, Emotion clarification  Progress Toward Treatment Goal: Mild improvement

## 2020-07-30 NOTE — BH THERAPY
Group Therapy Checklist  Attendance: Attended  Attendance Duration (min): (90)  Number of Participants: 3  Program/Group: Intensive Outpatient Program  Topics Covered: (Group Therapy)  Participation: Active verbal participation.  Pt processed her recent relapse lasting 9 days on alcohol and was able to stop drinking and get into Program, again.  This is her fourth time coming to Program but her relapses are getting shorter.   Affect/Mood Range: Flexible  Affect/Mood Display: Congruent w/content  Cognition: Oriented, Alert  Evidence of Imminent Suicide Risk: No  Evidence of imminent homicide risk: No  Therapeutic Interventions: Emotion clarification, Cognitive clarification  Progress Toward Treatment Goal: Moderate improvement

## 2020-07-31 ENCOUNTER — APPOINTMENT (OUTPATIENT)
Dept: BEHAVIORAL HEALTH | Facility: MEDICAL CENTER | Age: 40
End: 2020-07-31
Attending: PSYCHIATRY & NEUROLOGY
Payer: COMMERCIAL

## 2020-08-06 ENCOUNTER — APPOINTMENT (OUTPATIENT)
Dept: BEHAVIORAL HEALTH | Facility: MEDICAL CENTER | Age: 40
End: 2020-08-06
Attending: PSYCHIATRY & NEUROLOGY
Payer: COMMERCIAL

## 2020-08-11 ENCOUNTER — TELEPHONE (OUTPATIENT)
Dept: BEHAVIORAL HEALTH | Facility: MEDICAL CENTER | Age: 40
End: 2020-08-11

## 2020-08-12 ENCOUNTER — TELEPHONE (OUTPATIENT)
Dept: BEHAVIORAL HEALTH | Facility: MEDICAL CENTER | Age: 40
End: 2020-08-12

## 2020-08-13 ENCOUNTER — TELEPHONE (OUTPATIENT)
Dept: BEHAVIORAL HEALTH | Facility: MEDICAL CENTER | Age: 40
End: 2020-08-13

## 2020-08-14 NOTE — BH THERAPY
[facility]  TRANSFER/DISCHARGE SUMMARY FORM    HHPI / SCP:  Other Ins.:      Patient Name: Donna Morales  Admission Date: 20  Level of Care Attended:  Intens.OP : 1980  Transfer/Discharge Date: MRN: [id number]  20       SIGNIFICANT FINDINGS/CLINICAL IMPRESSION:   DSM Codes:   F10.20    ICD10 Codes:   1. Alcohol use disorder, severe, dependence (HCC)        Treatment Components in Which Patient Participated (check all that apply):  Education group(s) and Group Therapy    Summary of Course of Treatment: Pt completed the initial intake on 20 for alcohol use disorder.  She attended the first scheduled day of the IOP Program on 20 and never came back.  She returned to work as a  and her prep weeks prior to school starting did not seem to allow her the ability to attend on a regular basis.  We made several adjustments in trying other start dates but she did not follow through with them and did not respond to the three calls made to continue to reach out to her in the second week of August.     Condition at Time of Transfer/Discharge: Case administratively closed due to lack of pt contact for extended period of time.     [] Medications Reviewed with Copy to Patient    Referred to: Unable to reach her to give referrals.      Patient is in agreement with discharge plan: n/a    CLARI RangelFSONNY.

## 2020-09-14 ENCOUNTER — OFFICE VISIT (OUTPATIENT)
Dept: URGENT CARE | Facility: PHYSICIAN GROUP | Age: 40
End: 2020-09-14
Payer: COMMERCIAL

## 2020-09-14 VITALS
BODY MASS INDEX: 28.87 KG/M2 | OXYGEN SATURATION: 97 % | SYSTOLIC BLOOD PRESSURE: 118 MMHG | TEMPERATURE: 97.6 F | HEART RATE: 84 BPM | DIASTOLIC BLOOD PRESSURE: 82 MMHG | WEIGHT: 163 LBS | RESPIRATION RATE: 14 BRPM

## 2020-09-14 DIAGNOSIS — T50.6X5A: ICD-10-CM

## 2020-09-14 DIAGNOSIS — R11.2 NAUSEA AND VOMITING, INTRACTABILITY OF VOMITING NOT SPECIFIED, UNSPECIFIED VOMITING TYPE: ICD-10-CM

## 2020-09-14 PROCEDURE — 99214 OFFICE O/P EST MOD 30 MIN: CPT | Performed by: NURSE PRACTITIONER

## 2020-09-14 RX ORDER — ONDANSETRON 4 MG/1
4 TABLET, ORALLY DISINTEGRATING ORAL EVERY 6 HOURS PRN
Qty: 10 TAB | Refills: 0 | Status: SHIPPED | OUTPATIENT
Start: 2020-09-14 | End: 2020-10-23

## 2020-09-14 ASSESSMENT — ENCOUNTER SYMPTOMS
NAUSEA: 1
RESPIRATORY NEGATIVE: 1
WEAKNESS: 0
SENSORY CHANGE: 0
FEVER: 0
VOMITING: 1
CARDIOVASCULAR NEGATIVE: 1
PSYCHIATRIC NEGATIVE: 1
HEADACHES: 1
CONSTITUTIONAL NEGATIVE: 1

## 2020-09-14 ASSESSMENT — FIBROSIS 4 INDEX: FIB4 SCORE: 0.76

## 2020-09-14 ASSESSMENT — VISUAL ACUITY: OU: 1

## 2020-09-14 NOTE — PATIENT INSTRUCTIONS
Disulfiram tablets  What is this medicine?  DISULFIRAM (dye SUL fi rina) can help patients with an alcohol abuse problem not to drink alcohol. When taken with alcohol, this medicine produces unpleasant effects. This medicine is part of a recovery program that includes medical supervision and counseling. It is not a cure.  This medicine may be used for other purposes; ask your health care provider or pharmacist if you have questions.  COMMON BRAND NAME(S): Antabuse  What should I tell my health care provider before I take this medicine?  They need to know if you have any of the following conditions:  · brain damage  · diabetes  · heart disease  · kidney disease  · liver disease  · psychotic disease  · recently exposure to alcohol or any product that contains alcohol  · seizures  · taking metronidazole or paraldehyde  · under-active thyroid  · an unusual or allergic reaction to disulfiram, pesticides or rubber products, other medicines, foods, dyes, or preservatives  · pregnant or trying to get pregnant  · breast-feeding  How should I use this medicine?  Take this medicine by mouth with a full glass of water. Follow the directions on the prescription label. You must never take this medicine within 12 hours of taking any alcohol. The tablets can be crushed and mixed with liquid before taking. Take your medicine at regular intervals. Do not take your medicine more often than directed. Do not stop taking except on your doctor's advice.  Talk to your pediatrician regarding the use of this medicine in children. Special care may be needed.  Overdosage: If you think you have taken too much of this medicine contact a poison control center or emergency room at once.  NOTE: This medicine is only for you. Do not share this medicine with others.  What if I miss a dose?  If you miss a dose, take it as soon as you can. If it is almost time for your next dose, take only that dose. Do not take double or extra doses.  What may interact  with this medicine?  Do not take this medicine with any of the following medications:  · alcohol or any product that contains alcohol  · amprenavir  · cocaine  · lopinavir; ritonavir  · metronidazole  · oral solutions of ritonavir or sertraline  · paclitaxel  · paraldehyde  · tranylcypromine  This medicine may also interact with the following medications:  · isoniazid  · medicines that treat or prevent blood clots like warfarin  · phenytoin  This list may not describe all possible interactions. Give your health care provider a list of all the medicines, herbs, non-prescription drugs, or dietary supplements you use. Also tell them if you smoke, drink alcohol, or use illegal drugs. Some items may interact with your medicine.  What should I watch for while using this medicine?  Visit your doctor or health care professional for regular checks on your progress.  Never take this medicine if you have been drinking alcohol. Make sure that family members or others in your household know about this medicine and what to do in an emergency. When this medicine is taken with even small amounts of alcohol, it will produce very unpleasant effects. You may get a throbbing headache, flushing, vomiting, weakness and chest pain. Breathing and heart problems, seizures and death can occur. This medicine can react with alcohol even 14 days after you take your last dose.  Never take products or use toiletries that contain alcohol. Always read labels carefully. Many cough syrups, liquid pain medications, tonics, mouthwashes, after shave lotions, colognes, liniments, vinegar's, and sauces contain alcohol.  Wear a medical identification bracelet or chain to say you are taking this medicine. Carry an identification card with your name, name and dose of medicine being used, and name and phone number of your doctor and/or person to contact in an emergency.  What side effects may I notice from receiving this medicine?  Side effects that you  should report to your doctor or health care professional as soon as possible:  · allergic reactions like skin rash, itching or hives, swelling of the face, lips, or tongue  · changes in vision  · confusion, disorientation, irritability  · dark urine  · general ill feeling or flu-like symptoms  · loss of appetite, nausea  · loss of contact with reality  · numbness, pain or tingling  · right upper belly pain  · unusually weak or tired  · yellowing of the eyes or skin  Side effects that usually do not require medical attention (report to your doctor or health care professional if they continue or are bothersome):  · change in sex drive or performance  · dizziness  · drowsy, tired  · headache  · metallic or garlic taste  · nausea, vomiting  This list may not describe all possible side effects. Call your doctor for medical advice about side effects. You may report side effects to FDA at 7-838-FDA-8026.  Where should I keep my medicine?  Keep out of the reach of children.  Store at room temperature between 20 and 25 degrees C (68 and 77 degrees F). Keep in a tight light resistant container. Throw away any unused medicine after the expiration date.  NOTE: This sheet is a summary. It may not cover all possible information. If you have questions about this medicine, talk to your doctor, pharmacist, or health care provider.  © 2020 [employer] (2009-04-09 17:43:23)

## 2020-09-14 NOTE — PROGRESS NOTES
"Subjective:     Donna Morales is a 40 y.o. female who presents for Medication Reaction (pt is on medication for alcohol abuse. was given alcohol in fruit last night)       Medication Reaction  This is a new problem. Associated symptoms include headaches, nausea and vomiting. Pertinent negatives include no fever or weakness.     Patient reports that about 1 week ago, she inadvertently consumed alcohol when she was given cherries treated with moonshine.  Mother reports that patient has had a history of alcohol problems and has been clean since March 2020.  Patient has been on Antabuse since that time.  Mother notes that patient initially appeared \"delirious.\"  This has since improved.  Patient continues to report nausea and vomiting.  Also reports a mild headache.  Admits to drinking ever since consuming the cherries, but has stopped.  Patient also missed work today and needs a note for work.    Patient was screened prior to rooming and denied COVID-19 diagnosis or contact with a person who has been diagnosed or is suspected to have COVID-19. During this visit, appropriate PPE was worn, hand hygiene was performed, and the patient and any visitors were masked.     PMH:  has a past medical history of Alcoholism (HCC), Anemia, Anesthesia, Anxiety, Dialysis (2008), Heart burn, Hypertension, Kidney failure (2008), Liver failure (HCC), Other specified symptom associated with female genital organs, Pancreatitis, and Wrist pain.    MEDS:   Current Outpatient Medications:   •  ondansetron (ZOFRAN ODT) 4 MG TABLET DISPERSIBLE, Take 1 Tab by mouth every 6 hours as needed for Nausea. Dissolve in mouth., Disp: 10 Tab, Rfl: 0  •  gabapentin (NEURONTIN) 300 MG Cap, Take 1 Cap by mouth 3 times a day., Disp: 270 Cap, Rfl: 0  •  acamprosate (CAMPRAL) 333 MG tablet, Take 1 Tab by mouth 3 times a day., Disp: 270 Tab, Rfl: 0  •  traZODone (DESYREL) 100 MG Tab, Take 1 Tab by mouth at bedtime as needed for Sleep., Disp: 90 Tab, " Rfl: 0  •  FLUoxetine (PROZAC) 20 MG Cap, Take 1 Cap by mouth every bedtime., Disp: 90 Cap, Rfl: 0  •  hydrOXYzine pamoate (VISTARIL) 50 MG Cap, Take 1 Cap by mouth 2 times a day as needed for Anxiety., Disp: 180 Cap, Rfl: 0  •  LO LOESTRIN FE 1 MG-10 MCG / 10 MCG Tab, Take 1 tablet by mouth every day. Take 1 tablet by mouth every day, Disp: , Rfl:   •  disulfiram (ANTABUSE) 250 MG Tab, Take 250 mg by mouth 2 Times a Day., Disp: , Rfl:   •  Cyanocobalamin (B-12) 1000 MCG Cap, Take  by mouth., Disp: , Rfl:   •  Melatonin 5 MG Cap, Take 10 mg by mouth every bedtime., Disp: , Rfl: 0    ALLERGIES: No Known Allergies    SURGHX:   Past Surgical History:   Procedure Laterality Date   • FLEXOR TENDON REPAIR  7/18/2012    Performed by MAI BRITO at [facility] ORS   • AMANDA BY LAPAROSCOPY  2006   • LAPAROSCOPY  2000    endometriosis     SOCHX:  reports that she has never smoked. She has never used smokeless tobacco. She reports current alcohol use. She reports previous drug use.     FH: Reviewed with patient, not pertinent to this visit.    Review of Systems   Constitutional: Negative.  Negative for fever and malaise/fatigue.   Respiratory: Negative.    Cardiovascular: Negative.    Gastrointestinal: Positive for nausea and vomiting.   Neurological: Positive for headaches. Negative for sensory change and weakness.   Psychiatric/Behavioral: Negative.    All other systems reviewed and are negative.    Additional details per HPI.      Objective:     /82 (BP Location: Right arm, Patient Position: Sitting, BP Cuff Size: Small adult)   Pulse 84   Temp 36.4 °C (97.6 °F) (Temporal)   Resp 14   Wt 73.9 kg (163 lb)   SpO2 97%   BMI 28.87 kg/m²     Physical Exam  Vitals signs reviewed.   Constitutional:       General: She is not in acute distress.     Appearance: She is well-developed. She is not ill-appearing or toxic-appearing.   HENT:      Head: Normocephalic.      Right Ear: External ear normal.       Left Ear: External ear normal.      Nose: Nose normal.   Eyes:      General: Vision grossly intact.      Extraocular Movements: Extraocular movements intact.      Conjunctiva/sclera: Conjunctivae normal.   Neck:      Musculoskeletal: Normal range of motion.   Cardiovascular:      Rate and Rhythm: Normal rate and regular rhythm.      Heart sounds: Normal heart sounds.   Pulmonary:      Effort: Pulmonary effort is normal. No respiratory distress.      Breath sounds: Normal breath sounds. No decreased breath sounds.   Abdominal:      General: Bowel sounds are normal.   Musculoskeletal: Normal range of motion.         General: No deformity.   Skin:     General: Skin is warm and dry.      Coloration: Skin is not pale.   Neurological:      Mental Status: She is alert and oriented to person, place, and time.      Sensory: No sensory deficit.      Motor: No weakness.      Coordination: Coordination normal.   Psychiatric:         Behavior: Behavior normal. Behavior is cooperative.       Assessment/Plan:     1. Nausea and vomiting, intractability of vomiting not specified, unspecified vomiting type  - ondansetron (ZOFRAN ODT) 4 MG TABLET DISPERSIBLE; Take 1 Tab by mouth every 6 hours as needed for Nausea. Dissolve in mouth.  Dispense: 10 Tab; Refill: 0    2. Disulfiram adverse reaction, initial encounter    Rx as above sent electronically. Avoid further alcohol. Increase fluids. OTC ibuprofen as needed for headache. Rest. Work note provided.     Vital signs stable, afebrile, asymptomatic, no acute distress at this time.     Differential diagnosis, natural history, supportive care, over-the-counter symptom management per 's instructions, close monitoring, and indications for immediate follow-up discussed.     Warning signs reviewed. Return precautions discussed.     Patient advised to: Return for 1) Symptoms that worsen/don't improve, or go to ER, 2) Follow up with primary care in 7-10 days.    All questions  answered. Patient agrees with the plan of care.

## 2020-09-14 NOTE — LETTER
September 14, 2020         Patient: Donna Morales   YOB: 1980   Date of Visit: 9/14/2020           To Whom it May Concern:    Donna Morales was seen in my clinic on 9/14/2020 due to nausea resulting from a medication reaction. Due to medical necessity, please excuse patient from work for up to 3 days as needed.     If you have any questions or concerns, please don't hesitate to call.        Sincerely,           LOGAN Newton.  Electronically Signed

## 2020-10-23 ENCOUNTER — TELEMEDICINE (OUTPATIENT)
Dept: BEHAVIORAL HEALTH | Facility: CLINIC | Age: 40
End: 2020-10-23
Payer: COMMERCIAL

## 2020-10-23 VITALS — HEIGHT: 63 IN | BODY MASS INDEX: 28.87 KG/M2

## 2020-10-23 DIAGNOSIS — F10.20 ALCOHOL USE DISORDER, SEVERE, DEPENDENCE (HCC): ICD-10-CM

## 2020-10-23 DIAGNOSIS — F41.9 ANXIETY DISORDER, UNSPECIFIED TYPE: ICD-10-CM

## 2020-10-23 DIAGNOSIS — F51.04 CHRONIC INSOMNIA: ICD-10-CM

## 2020-10-23 PROCEDURE — 99214 OFFICE O/P EST MOD 30 MIN: CPT | Mod: 95,CR | Performed by: PSYCHIATRY & NEUROLOGY

## 2020-10-23 PROCEDURE — 90833 PSYTX W PT W E/M 30 MIN: CPT | Mod: 95,CR | Performed by: PSYCHIATRY & NEUROLOGY

## 2020-10-23 RX ORDER — ACAMPROSATE CALCIUM 333 MG/1
666 TABLET, DELAYED RELEASE ORAL 3 TIMES DAILY
Qty: 540 TAB | Refills: 0 | Status: SHIPPED | OUTPATIENT
Start: 2020-10-23 | End: 2021-01-21

## 2020-10-23 RX ORDER — HYDROXYZINE PAMOATE 50 MG/1
50 CAPSULE ORAL 2 TIMES DAILY PRN
Qty: 180 CAP | Refills: 0 | Status: SHIPPED | OUTPATIENT
Start: 2020-10-23 | End: 2021-10-21

## 2020-10-23 RX ORDER — GABAPENTIN 300 MG/1
300 CAPSULE ORAL 3 TIMES DAILY
Qty: 270 CAP | Refills: 0 | Status: SHIPPED | OUTPATIENT
Start: 2020-10-23 | End: 2021-08-05

## 2020-10-23 RX ORDER — FLUOXETINE HYDROCHLORIDE 20 MG/1
20 CAPSULE ORAL DAILY
Qty: 90 CAP | Refills: 0 | Status: SHIPPED | OUTPATIENT
Start: 2020-10-23 | End: 2021-08-24 | Stop reason: SDUPTHER

## 2020-10-23 RX ORDER — TRAZODONE HYDROCHLORIDE 100 MG/1
100 TABLET ORAL NIGHTLY PRN
Qty: 90 TAB | Refills: 0 | Status: SHIPPED | OUTPATIENT
Start: 2020-10-23 | End: 2020-12-18

## 2020-10-23 RX ORDER — NALTREXONE HYDROCHLORIDE 50 MG/1
50 TABLET, FILM COATED ORAL DAILY
Qty: 90 TAB | Refills: 0 | Status: SHIPPED | OUTPATIENT
Start: 2020-10-23 | End: 2021-10-21

## 2020-10-23 NOTE — PROGRESS NOTES
"PSYCHIATRY VIRTUAL VISIT FOLLOW-UP NOTE      Chief Complaint   Patient presents with   • Follow-Up     alcohol use disorder, anxiety, insomnia       This evaluation was conducted via Zoom using secure and encrypted videoconferencing technology. The patient was in a private location in the [facility].    The patient's identity was confirmed and verbal consent was obtained for this virtual visit.    History Of Present Illness:  Donna Morales is a 40 y.o. female with hypertension, alcohol use disorder, anxiety disorder, chronic insomnia comes in today for follow up, was last seen 3 months ago.  She had her mother in the background who wanted to be a part of her appointment as a way to offer support to her daughter.  She has had a few relapses on alcohol since her last appointment with me.  Her last relapse was during the fall break a few weeks ago and she ended up at [facility] for hospitalization for detox.  She has had alcohol a few times since her discharge and her last alcohol use was \"3 little bottles of wine\" last night.  She voluntarily got breathalyzer in her car and this morning she blew a 0.03 and she was unable to start her car as the level needs to be under 0.02 for her to do that.  She was notified by her  yesterday about somethings that she has not been doing right at her workplace and she is having her  meet with her and the  on Monday.  She was stressed about it and decided to drink as a way to cope with the stress.  She continues to have good support from her boyfriend who is also sober from alcohol as well as her mother and grandmother.  She has been spending a lot of time with her grandmother to keep herself occupied.  She is now  which has been a big change for her as for the last 18 years she has been a .  She mentioned that this was not a decision that she wanted but so far it has " been going well for her and she is enjoying it.  She has not had any troubles that work because of her drinking.  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  She is on the wait list to do the intensive outpatient program at [facility] and might be able to start it as early as November.  She is doing all right with her medications but has a hard time maintaining compliance with the midday dose of both Campral and Neurontin.  She is no longer taking Antabuse that she was unable to get it filled from her pharmacy.  She is doing all right in regards to her anxiety most of the times.  She has been sleeping better and is no longer using melatonin.  She denies having thoughts of wanting to hurt herself or others.      Social History:   She is in a relationship, never  and no kids, lives alone in [place], good support from her mother and grandmother and they both live in town, employed full time as a .    Substance Use:  Alcohol - See HPI for details, last alcohol use 10/22/2020  Nicotine - Denies  Cannabis - Denies  Illicit drugs - Denies    Past Medication Trials:  Ativan, Buspirone, Topamax for alcohol use disorder (ineffective), Naltrexone 50 mg, Antabuse 500 mg    Medications:  Current Outpatient Medications   Medication Sig Dispense Refill   • FLUoxetine (PROZAC) 20 MG Cap Take 1 Cap by mouth every day. 30 Cap 0   • ondansetron (ZOFRAN ODT) 4 MG TABLET DISPERSIBLE Take 1 Tab by mouth every 6 hours as needed for Nausea. Dissolve in mouth. 10 Tab 0   • gabapentin (NEURONTIN) 300 MG Cap Take 1 Cap by mouth 3 times a day. 270 Cap 0   • acamprosate (CAMPRAL) 333 MG tablet Take 1 Tab by mouth 3 times a day. 270 Tab 0   • traZODone (DESYREL) 100 MG Tab Take 1 Tab by mouth at bedtime as needed for Sleep. 90 Tab 0   • hydrOXYzine pamoate (VISTARIL) 50 MG Cap Take 1 Cap by mouth 2 times a day as needed for  "Anxiety. 180 Cap 0   • LO LOESTRIN FE 1 MG-10 MCG / 10 MCG Tab Take 1 tablet by mouth every day. Take 1 tablet by mouth every day     • disulfiram (ANTABUSE) 250 MG Tab Take 250 mg by mouth 2 Times a Day.     • Cyanocobalamin (B-12) 1000 MCG Cap Take  by mouth.     • Melatonin 5 MG Cap Take 10 mg by mouth every bedtime.  0     No current facility-administered medications for this visit.        Review Of Systems:    Constitutional - Positive for fatigue  Respiratory - Negative for shortness of breath, cough  CVS - Negative for chest pain, palpitations  GI - Negative for nausea, vomiting, abdominal pain, diarrhea, constipation. Positive for abdominal bloating  Musculoskeletal - Negative for back pain  Neurological - Negative for headaches  Psychiatric - Positive for anxiety, alcohol use    Physical Examination:  Vital signs: Ht 1.6 m (5' 3\")   BMI 28.87 kg/m²     Musculoskeletal: No abnormal movements.     Mental Status Evaluation:   General: Young black female, dressed in casual attire, good grooming and hygiene, in no apparent distress, calm and cooperative, good eye contact, no psychomotor agitation or retardation  Orientation: Alert and oriented to person, place and time  Recent and remote memory: Grossly intact  Attention span and concentration: Grossly intact  Speech: Spontaneous, normal rate, rhythm and tone  Thought Process: Linear, logical and goal directed  Thought Content: Denies suicidal or homicidal ideations, intent or plan  Perception: Denies auditory or visual hallucinations. No delusions noted  Associations: Intact  Language: Appropriate  Fund of knowledge and vocabulary: Grossly adequate  Mood: \"okay\"  Affect: Euthymic, mood congruent  Insight: Good  Judgment: Good    Depression screening:  Depression Screen (PHQ-2/PHQ-9) 10/11/2019 11/14/2019 2/19/2020   PHQ-2 Total Score - - -   PHQ-2 Total Score 0 0 0     Interpretation of PHQ-9 Total Score   Score Severity   1-4 No Depression   5-9 Mild " Depression   10-14 Moderate Depression   15-19 Moderately Severe Depression   20-27 Severe Depression    Medical Records/Labs/Diagnostic Tests Reviewed:  [place]  records - no prescribed controlled medications found in the last 1 year      Impression:  1.  Alcohol use disorder, severe (few relapses on alcohol since last appointment, last alcohol use 10/22/2020) - worsening  2.  Unspecified anxiety disorder - stable  3.  Chronic insomnia secondary to anxiety, alcohol use - stable    Plan:  1.  Continue Prozac 20 mg daily for anxiety  2.  Discontinue Antabuse since she is having a hard time getting it filled on a consistent basis because of nationwide shortage.    3.  Restart Naltrexone 50 mg daily for alcohol use disorder   4.  Increase Campral to 666 mg 3 times daily for alcohol use disorder  5.  Continue Neurontin 300 mg 3 times daily for anxiety and alcohol use disorder  6.  She is having a hard time maintaining compliance with the midday dose with both Campral and Neurontin and have advised her to take 1 bottle with 30 tablets each of Campral and Neurontin to work  and and take them during her lunch break.  Explained to her the importance of taking it 3 times a day instead of twice a day.  7.  Continue Hydroxyzine 50 mg twice daily as needed for anxiety  8.  Continue Trazodone 100 mg at bedtime as needed for sleep  9.  Continue to maintain sobriety from alcohol  10.  Provided supportive psychotherapy (> 16 minutes) in regards to her relapses on alcohol.  Discussed her triggers including breaks in free time and using alcohol as a coping mechanism when she is feeling stressed rather than using other coping skills.  I have again encouraged her to look into an inpatient alcohol rehab program during her winter break as she continues to be high risk for relapse especially during holidays and breaks.   11.  Discussed her lab results from July 2020 and I let her know that her anemia has worsened and she needs to set up an  appointment with her PCP to go over this.    Return to clinic in 4-6 weeks or sooner if symptoms worsen    The proposed treatment plan was discussed with the patient who was provided the opportunity to ask questions and make suggestions regarding alternative treatment. Patient verbalized understanding and expressed agreement with the plan.     Sujey Dunbar M.D.  10/23/20    This note was created using voice recognition software (Dragon). The accuracy of the dictation is limited by the abilities of the software. I have reviewed the note prior to signing, however some errors in grammar and context are still possible. If you have any questions related to this note please do not hesitate to contact our office.

## 2020-11-09 ENCOUNTER — TELEMEDICINE (OUTPATIENT)
Dept: BEHAVIORAL HEALTH | Facility: CLINIC | Age: 40
End: 2020-11-09
Payer: COMMERCIAL

## 2020-11-09 VITALS — BODY MASS INDEX: 26.58 KG/M2 | HEIGHT: 63 IN | WEIGHT: 150 LBS

## 2020-11-09 DIAGNOSIS — F10.20 ALCOHOL USE DISORDER, SEVERE, DEPENDENCE (HCC): ICD-10-CM

## 2020-11-09 DIAGNOSIS — F51.04 CHRONIC INSOMNIA: ICD-10-CM

## 2020-11-09 DIAGNOSIS — F41.9 ANXIETY DISORDER, UNSPECIFIED TYPE: ICD-10-CM

## 2020-11-09 PROCEDURE — 99214 OFFICE O/P EST MOD 30 MIN: CPT | Mod: 95,CR | Performed by: PSYCHIATRY & NEUROLOGY

## 2020-11-09 PROCEDURE — 90833 PSYTX W PT W E/M 30 MIN: CPT | Mod: 95,CR | Performed by: PSYCHIATRY & NEUROLOGY

## 2020-11-09 ASSESSMENT — FIBROSIS 4 INDEX: FIB4 SCORE: 0.76

## 2020-11-10 NOTE — PROGRESS NOTES
PSYCHIATRY VIRTUAL VISIT FOLLOW-UP NOTE      Chief Complaint   Patient presents with   • Follow-Up     alcohol use, anxiety, sleep       This evaluation was conducted via Zoom using secure and encrypted videoconferencing technology. The patient was in a private location in the [facility].    The patient's identity was confirmed and verbal consent was obtained for this virtual visit.    History Of Present Illness:  Donna Morales is a 40 y.o. female with hypertension, alcohol use disorder, anxiety disorder, chronic insomnia comes in today for follow up, was last seen 2 weeks ago.  She drank alcohol 1 time on 29 October.  She was feeling overwhelmed due to work-related stressors and had 3-4 small wine bottles and shot of hard liquor.  She is trying to have a good period of sobriety but understands that her triggers are stress from school and longer breaks from school.  She continues to live with her grandmother as she is helping her with her health and spends some time with her boyfriend at his place.  She still has her place but is not spending much time over there.  She has been compliant with all of her medications and has been able to take her medications 3 times daily.  She is doing okay in regards to her anxiety and insomnia.  She denies having thoughts of wanting to hurt herself or others.  She plans on spending Thanksgiving with her boyfriend who is sober from alcohol and does not allow any alcohol in his house.  She denies that alcohol is a big thing for her family gatherings.    Social History:   She is in a relationship, never  and no kids, lives alone in [place], good support from her mother and grandmother and they both live in town, employed full time as a .    Substance Use:  Alcohol - Last alcohol use 10/29/20  Nicotine - Denies  Cannabis - Denies  Illicit drugs - Denies    Past Medication Trials:  Ativan, Buspirone, Topamax for alcohol use disorder (ineffective),  "Antabuse 500 mg    Medications:  Current Outpatient Medications   Medication Sig Dispense Refill   • naltrexone (DEPADE) 50 MG Tab Take 1 Tab by mouth every day. 90 Tab 0   • acamprosate (CAMPRAL) 333 MG tablet Take 2 Tabs by mouth 3 times a day for 90 days. 540 Tab 0   • FLUoxetine (PROZAC) 20 MG Cap Take 1 Cap by mouth every day. 90 Cap 0   • gabapentin (NEURONTIN) 300 MG Cap Take 1 Cap by mouth 3 times a day. 270 Cap 0   • hydrOXYzine pamoate (VISTARIL) 50 MG Cap Take 1 Cap by mouth 2 times a day as needed for Anxiety. 180 Cap 0   • traZODone (DESYREL) 100 MG Tab Take 1 Tab by mouth at bedtime as needed for Sleep. 90 Tab 0     No current facility-administered medications for this visit.        Review Of Systems:    Constitutional - Positive for fatigue  Respiratory - Negative for shortness of breath, cough  CVS - Negative for chest pain, palpitations  GI - Negative for nausea, vomiting, abdominal pain, diarrhea, constipation. Positive for abdominal bloating  Musculoskeletal - Negative for back pain  Neurological - Negative for headaches  Psychiatric - Positive for anxiety, alcohol use    Physical Examination:  Vital signs: Ht 1.6 m (5' 3\")   Wt 68 kg (150 lb)   BMI 26.57 kg/m²     Musculoskeletal: No abnormal movements.     Mental Status Evaluation:   General: Young black female, dressed in casual attire, good grooming and hygiene, in no apparent distress, calm and cooperative, good eye contact, no psychomotor agitation or retardation  Orientation: Alert and oriented to person, place and time  Recent and remote memory: Grossly intact  Attention span and concentration: Grossly intact  Speech: Spontaneous, normal rate, rhythm and tone  Thought Process: Linear, logical and goal directed  Thought Content: Denies suicidal or homicidal ideations, intent or plan  Perception: Denies auditory or visual hallucinations. No delusions noted  Associations: Intact  Language: Appropriate  Fund of knowledge and vocabulary: " "Grossly adequate  Mood: \"okay\"  Affect: Euthymic, mood congruent  Insight: Good  Judgment: Good    Depression screening:  Depression Screen (PHQ-2/PHQ-9) 10/11/2019 11/14/2019 2/19/2020   PHQ-2 Total Score - - -   PHQ-2 Total Score 0 0 0     Interpretation of PHQ-9 Total Score   Score Severity   1-4 No Depression   5-9 Mild Depression   10-14 Moderate Depression   15-19 Moderately Severe Depression   20-27 Severe Depression    Medical Records/Labs/Diagnostic Tests Reviewed:  [place]  records - no prescribed controlled medications found in the last 1 year      Impression:  1.  Alcohol use disorder, severe (last alcohol use 10/29/2020) - stable  2.  Unspecified anxiety disorder - stable  3.  Chronic insomnia secondary to anxiety, alcohol use - stable    Plan:  1.  Continue Prozac 20 mg daily for anxiety   2.  Continue Naltrexone 50 mg daily, Campral 666 mg three times daily, Neurontin 300 mg three times daily for alcohol use disorder   3.  Continue Hydroxyzine 50 mg 2-3 times daily as needed for anxiety  4.  Continue Trazodone 100 mg at bedtime as needed for sleep  5.  Continue to maintain sobriety from alcohol  6.  She is restarting psychotherapy with Enedina Hairston LCSW later this week  7.  Provided supportive psychotherapy (> 16 minutes) in regards to her alcohol use.  Discussed a plan to ensure sobriety through Thanksgiving including going straight to Artemio's home from work and spending the Thanksgiving break with him.  Advised involving both Artemio and mom in this plan so that they can follow-up with her.  Advised her to contact [facility] so that she can start their intensive outpatient program the week of Thanksgiving and hopefully can continue it through the winter break.    Return to clinic in 4-6 weeks or sooner if symptoms worsen    The proposed treatment plan was discussed with the patient who was provided the opportunity to ask questions and make suggestions regarding alternative treatment. " Patient verbalized understanding and expressed agreement with the plan.     Sujey Dunbar M.D.  10/23/20    This note was created using voice recognition software (Dragon). The accuracy of the dictation is limited by the abilities of the software. I have reviewed the note prior to signing, however some errors in grammar and context are still possible. If you have any questions related to this note please do not hesitate to contact our office.

## 2020-11-11 ENCOUNTER — OFFICE VISIT (OUTPATIENT)
Dept: BEHAVIORAL HEALTH | Facility: CLINIC | Age: 40
End: 2020-11-11
Payer: COMMERCIAL

## 2020-11-11 DIAGNOSIS — F41.9 ANXIETY DISORDER, UNSPECIFIED TYPE: ICD-10-CM

## 2020-11-11 DIAGNOSIS — F10.20 ALCOHOL USE DISORDER, SEVERE, DEPENDENCE (HCC): ICD-10-CM

## 2020-11-11 PROCEDURE — 90834 PSYTX W PT 45 MINUTES: CPT | Performed by: SOCIAL WORKER

## 2020-11-11 NOTE — BH THERAPY
[facility]  Therapy Progress Note    Patient Name: Donna Morales  Patient MRN: [id number]  Today's Date: 11/11/2020     Type of session:Individual psychotherapy  Length of session: 45 minutes  Persons in attendance:Patient    Subjective/New Info: Pt states that she has been seeing a therapist mandated by her job, but that she is now released from that, so is returning to this provider.  Pt had 90 days of sobriety earlier in the year, but has struggled since.  She went to detox at [facility], in early Sept and over fall break.  Pt's secretive alcohol use has caused some problems in relationship w/her bf, but they remain committed to each other. 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  Pt is determined to have a positive outcome despite this not being her choice.  Pt expresses her intention to maintain sobriety.    Objective/Observations:   Participation: Active verbal participation   Grooming: Neat and wearing mask   Cognition: Fully Oriented   Eye contact: Good   Mood: Mildly anxious   Affect: Congruent with content   Thought process: Goal-directed   Speech: Rate within normal limits and Volume within normal limits   Other:     Diagnoses:   1. Alcohol use disorder, severe, dependence (HCC)    2. Anxiety disorder, unspecified type         Current risk:   SUICIDE: Low   Homicide: Low   Self-harm: Low   Relapse: Moderate   Other:    Safety Plan reviewed? Not Indicated   If evidence of imminent risk is present, intervention/plan:     Therapeutic Intervention(s): Cognitive modification, Goal-setting, Maladaptive behavior addressed, Self-care skills, Stressors assessed and Supportive psychotherapy    Treatment Goal(s)/Objective(s) addressed: Discussed pt's support network for sobriety; reinforced that pt cannot drink moderately; commended pt for her openness about her drinking     Progress toward Treatment Goals:  Mild improvement    Plan:  - Continue Individual therapy, Medication management and 12-Step participation    Enedina Hairston L.C.S.W.  11/11/2020

## 2021-06-01 ENCOUNTER — HOSPITAL ENCOUNTER (INPATIENT)
Facility: MEDICAL CENTER | Age: 41
LOS: 2 days | DRG: 897 | End: 2021-06-03
Attending: EMERGENCY MEDICINE | Admitting: INTERNAL MEDICINE
Payer: COMMERCIAL

## 2021-06-01 DIAGNOSIS — E87.20 ACIDEMIA: ICD-10-CM

## 2021-06-01 DIAGNOSIS — F10.10 ALCOHOL ABUSE: ICD-10-CM

## 2021-06-01 DIAGNOSIS — E83.51 HYPOCALCEMIA: ICD-10-CM

## 2021-06-01 DIAGNOSIS — E86.0 DEHYDRATION: ICD-10-CM

## 2021-06-01 DIAGNOSIS — R10.13 EPIGASTRIC PAIN: ICD-10-CM

## 2021-06-01 PROBLEM — E87.0 HYPERNATREMIA: Status: ACTIVE | Noted: 2021-06-01

## 2021-06-01 LAB
ALBUMIN SERPL BCP-MCNC: 2.8 G/DL (ref 3.2–4.9)
ALBUMIN/GLOB SERPL: 1.2 G/DL
ALP SERPL-CCNC: 80 U/L (ref 30–99)
ALT SERPL-CCNC: 13 U/L (ref 2–50)
ANION GAP SERPL CALC-SCNC: 13 MMOL/L (ref 7–16)
ANION GAP SERPL CALC-SCNC: 14 MMOL/L (ref 7–16)
APAP SERPL-MCNC: <5 UG/ML (ref 10–30)
AST SERPL-CCNC: 31 U/L (ref 12–45)
BASOPHILS # BLD AUTO: 1.6 % (ref 0–1.8)
BASOPHILS # BLD: 0.06 K/UL (ref 0–0.12)
BILIRUB SERPL-MCNC: <0.2 MG/DL (ref 0.1–1.5)
BUN SERPL-MCNC: 20 MG/DL (ref 8–22)
BUN SERPL-MCNC: 21 MG/DL (ref 8–22)
CA-I SERPL-SCNC: 1 MMOL/L (ref 1.1–1.3)
CALCIUM SERPL-MCNC: 6.1 MG/DL (ref 8.5–10.5)
CALCIUM SERPL-MCNC: 8.6 MG/DL (ref 8.5–10.5)
CHLORIDE SERPL-SCNC: 115 MMOL/L (ref 96–112)
CHLORIDE SERPL-SCNC: 127 MMOL/L (ref 96–112)
CO2 SERPL-SCNC: 11 MMOL/L (ref 20–33)
CO2 SERPL-SCNC: 15 MMOL/L (ref 20–33)
CREAT SERPL-MCNC: 0.78 MG/DL (ref 0.5–1.4)
CREAT SERPL-MCNC: 0.99 MG/DL (ref 0.5–1.4)
EOSINOPHIL # BLD AUTO: 0.05 K/UL (ref 0–0.51)
EOSINOPHIL NFR BLD: 1.3 % (ref 0–6.9)
ERYTHROCYTE [DISTWIDTH] IN BLOOD BY AUTOMATED COUNT: 61.1 FL (ref 35.9–50)
ETHANOL BLD-MCNC: 291.2 MG/DL (ref 0–10)
GLOBULIN SER CALC-MCNC: 2.4 G/DL (ref 1.9–3.5)
GLUCOSE SERPL-MCNC: 73 MG/DL (ref 65–99)
GLUCOSE SERPL-MCNC: 80 MG/DL (ref 65–99)
HCG SERPL QL: NEGATIVE
HCT VFR BLD AUTO: 27.4 % (ref 37–47)
HGB BLD-MCNC: 8.5 G/DL (ref 12–16)
IMM GRANULOCYTES # BLD AUTO: 0.01 K/UL (ref 0–0.11)
IMM GRANULOCYTES NFR BLD AUTO: 0.3 % (ref 0–0.9)
LIPASE SERPL-CCNC: 5 U/L (ref 11–82)
LYMPHOCYTES # BLD AUTO: 1.81 K/UL (ref 1–4.8)
LYMPHOCYTES NFR BLD: 47.4 % (ref 22–41)
MCH RBC QN AUTO: 28.2 PG (ref 27–33)
MCHC RBC AUTO-ENTMCNC: 31 G/DL (ref 33.6–35)
MCV RBC AUTO: 91 FL (ref 81.4–97.8)
MONOCYTES # BLD AUTO: 0.19 K/UL (ref 0–0.85)
MONOCYTES NFR BLD AUTO: 5 % (ref 0–13.4)
NEUTROPHILS # BLD AUTO: 1.7 K/UL (ref 2–7.15)
NEUTROPHILS NFR BLD: 44.4 % (ref 44–72)
NRBC # BLD AUTO: 0 K/UL
NRBC BLD-RTO: 0 /100 WBC
PLATELET # BLD AUTO: 186 K/UL (ref 164–446)
PMV BLD AUTO: 9.4 FL (ref 9–12.9)
POTASSIUM SERPL-SCNC: 3.4 MMOL/L (ref 3.6–5.5)
POTASSIUM SERPL-SCNC: 4.3 MMOL/L (ref 3.6–5.5)
PROT SERPL-MCNC: 5.2 G/DL (ref 6–8.2)
RBC # BLD AUTO: 3.01 M/UL (ref 4.2–5.4)
SALICYLATES SERPL-MCNC: <1 MG/DL (ref 15–25)
SODIUM SERPL-SCNC: 144 MMOL/L (ref 135–145)
SODIUM SERPL-SCNC: 151 MMOL/L (ref 135–145)
WBC # BLD AUTO: 3.8 K/UL (ref 4.8–10.8)

## 2021-06-01 PROCEDURE — 700111 HCHG RX REV CODE 636 W/ 250 OVERRIDE (IP): Performed by: INTERNAL MEDICINE

## 2021-06-01 PROCEDURE — 82330 ASSAY OF CALCIUM: CPT

## 2021-06-01 PROCEDURE — 84703 CHORIONIC GONADOTROPIN ASSAY: CPT

## 2021-06-01 PROCEDURE — 700102 HCHG RX REV CODE 250 W/ 637 OVERRIDE(OP): Performed by: INTERNAL MEDICINE

## 2021-06-01 PROCEDURE — 99223 1ST HOSP IP/OBS HIGH 75: CPT | Performed by: INTERNAL MEDICINE

## 2021-06-01 PROCEDURE — 82077 ASSAY SPEC XCP UR&BREATH IA: CPT

## 2021-06-01 PROCEDURE — 99285 EMERGENCY DEPT VISIT HI MDM: CPT

## 2021-06-01 PROCEDURE — U0005 INFEC AGEN DETEC AMPLI PROBE: HCPCS

## 2021-06-01 PROCEDURE — 85025 COMPLETE CBC W/AUTO DIFF WBC: CPT

## 2021-06-01 PROCEDURE — 700111 HCHG RX REV CODE 636 W/ 250 OVERRIDE (IP): Performed by: EMERGENCY MEDICINE

## 2021-06-01 PROCEDURE — 700105 HCHG RX REV CODE 258: Performed by: INTERNAL MEDICINE

## 2021-06-01 PROCEDURE — 83690 ASSAY OF LIPASE: CPT

## 2021-06-01 PROCEDURE — 80053 COMPREHEN METABOLIC PANEL: CPT

## 2021-06-01 PROCEDURE — 80048 BASIC METABOLIC PNL TOTAL CA: CPT

## 2021-06-01 PROCEDURE — A9270 NON-COVERED ITEM OR SERVICE: HCPCS | Performed by: INTERNAL MEDICINE

## 2021-06-01 PROCEDURE — 80179 DRUG ASSAY SALICYLATE: CPT

## 2021-06-01 PROCEDURE — 700105 HCHG RX REV CODE 258: Performed by: EMERGENCY MEDICINE

## 2021-06-01 PROCEDURE — 770020 HCHG ROOM/CARE - TELE (206)

## 2021-06-01 PROCEDURE — A9270 NON-COVERED ITEM OR SERVICE: HCPCS | Performed by: EMERGENCY MEDICINE

## 2021-06-01 PROCEDURE — U0003 INFECTIOUS AGENT DETECTION BY NUCLEIC ACID (DNA OR RNA); SEVERE ACUTE RESPIRATORY SYNDROME CORONAVIRUS 2 (SARS-COV-2) (CORONAVIRUS DISEASE [COVID-19]), AMPLIFIED PROBE TECHNIQUE, MAKING USE OF HIGH THROUGHPUT TECHNOLOGIES AS DESCRIBED BY CMS-2020-01-R: HCPCS

## 2021-06-01 PROCEDURE — 80143 DRUG ASSAY ACETAMINOPHEN: CPT

## 2021-06-01 PROCEDURE — 700102 HCHG RX REV CODE 250 W/ 637 OVERRIDE(OP): Performed by: EMERGENCY MEDICINE

## 2021-06-01 PROCEDURE — 96375 TX/PRO/DX INJ NEW DRUG ADDON: CPT

## 2021-06-01 PROCEDURE — 96365 THER/PROPH/DIAG IV INF INIT: CPT

## 2021-06-01 RX ORDER — GAUZE BANDAGE 2" X 2"
100 BANDAGE TOPICAL DAILY
Status: DISCONTINUED | OUTPATIENT
Start: 2021-06-02 | End: 2021-06-02

## 2021-06-01 RX ORDER — FLUOXETINE HYDROCHLORIDE 20 MG/1
20 CAPSULE ORAL DAILY
Status: DISCONTINUED | OUTPATIENT
Start: 2021-06-02 | End: 2021-06-01

## 2021-06-01 RX ORDER — SODIUM CHLORIDE 9 MG/ML
INJECTION, SOLUTION INTRAVENOUS CONTINUOUS
Status: DISCONTINUED | OUTPATIENT
Start: 2021-06-01 | End: 2021-06-02

## 2021-06-01 RX ORDER — PROMETHAZINE HYDROCHLORIDE 25 MG/1
12.5-25 TABLET ORAL EVERY 4 HOURS PRN
Status: DISCONTINUED | OUTPATIENT
Start: 2021-06-01 | End: 2021-06-03 | Stop reason: HOSPADM

## 2021-06-01 RX ORDER — LABETALOL HYDROCHLORIDE 5 MG/ML
10 INJECTION, SOLUTION INTRAVENOUS EVERY 4 HOURS PRN
Status: DISCONTINUED | OUTPATIENT
Start: 2021-06-01 | End: 2021-06-03 | Stop reason: HOSPADM

## 2021-06-01 RX ORDER — LORAZEPAM 2 MG/ML
0.5 INJECTION INTRAMUSCULAR EVERY 4 HOURS PRN
Status: DISCONTINUED | OUTPATIENT
Start: 2021-06-01 | End: 2021-06-03 | Stop reason: HOSPADM

## 2021-06-01 RX ORDER — ONDANSETRON 4 MG/1
4 TABLET, ORALLY DISINTEGRATING ORAL EVERY 4 HOURS PRN
Status: DISCONTINUED | OUTPATIENT
Start: 2021-06-01 | End: 2021-06-03 | Stop reason: HOSPADM

## 2021-06-01 RX ORDER — LORAZEPAM 0.5 MG/1
0.5 TABLET ORAL EVERY 4 HOURS PRN
Status: DISCONTINUED | OUTPATIENT
Start: 2021-06-01 | End: 2021-06-03 | Stop reason: HOSPADM

## 2021-06-01 RX ORDER — LORAZEPAM 2 MG/1
4 TABLET ORAL
Status: DISCONTINUED | OUTPATIENT
Start: 2021-06-01 | End: 2021-06-03 | Stop reason: HOSPADM

## 2021-06-01 RX ORDER — FOLIC ACID 1 MG/1
1 TABLET ORAL DAILY
Status: DISCONTINUED | OUTPATIENT
Start: 2021-06-02 | End: 2021-06-02

## 2021-06-01 RX ORDER — GABAPENTIN 300 MG/1
300 CAPSULE ORAL 3 TIMES DAILY
Status: DISCONTINUED | OUTPATIENT
Start: 2021-06-01 | End: 2021-06-01

## 2021-06-01 RX ORDER — DIPHENHYDRAMINE HCL, IBUPROFEN 25; 200 MG/1; MG/1
2-4 CAPSULE, LIQUID FILLED ORAL NIGHTLY PRN
Status: ON HOLD | COMMUNITY
End: 2021-06-03

## 2021-06-01 RX ORDER — CLONIDINE HYDROCHLORIDE 0.1 MG/1
0.1 TABLET ORAL EVERY 6 HOURS PRN
Status: DISCONTINUED | OUTPATIENT
Start: 2021-06-01 | End: 2021-06-03 | Stop reason: HOSPADM

## 2021-06-01 RX ORDER — POLYETHYLENE GLYCOL 3350 17 G/17G
1 POWDER, FOR SOLUTION ORAL
Status: DISCONTINUED | OUTPATIENT
Start: 2021-06-01 | End: 2021-06-03 | Stop reason: HOSPADM

## 2021-06-01 RX ORDER — LORAZEPAM 2 MG/ML
2 INJECTION INTRAMUSCULAR
Status: DISCONTINUED | OUTPATIENT
Start: 2021-06-01 | End: 2021-06-03 | Stop reason: HOSPADM

## 2021-06-01 RX ORDER — SODIUM CHLORIDE, SODIUM LACTATE, POTASSIUM CHLORIDE, CALCIUM CHLORIDE 600; 310; 30; 20 MG/100ML; MG/100ML; MG/100ML; MG/100ML
1000 INJECTION, SOLUTION INTRAVENOUS ONCE
Status: COMPLETED | OUTPATIENT
Start: 2021-06-01 | End: 2021-06-01

## 2021-06-01 RX ORDER — PROMETHAZINE HYDROCHLORIDE 25 MG/1
12.5-25 SUPPOSITORY RECTAL EVERY 4 HOURS PRN
Status: DISCONTINUED | OUTPATIENT
Start: 2021-06-01 | End: 2021-06-03 | Stop reason: HOSPADM

## 2021-06-01 RX ORDER — ONDANSETRON 2 MG/ML
4 INJECTION INTRAMUSCULAR; INTRAVENOUS EVERY 4 HOURS PRN
Status: DISCONTINUED | OUTPATIENT
Start: 2021-06-01 | End: 2021-06-03 | Stop reason: HOSPADM

## 2021-06-01 RX ORDER — ENALAPRILAT 1.25 MG/ML
1.25 INJECTION INTRAVENOUS EVERY 6 HOURS PRN
Status: DISCONTINUED | OUTPATIENT
Start: 2021-06-01 | End: 2021-06-03 | Stop reason: HOSPADM

## 2021-06-01 RX ORDER — ONDANSETRON 2 MG/ML
4 INJECTION INTRAMUSCULAR; INTRAVENOUS ONCE
Status: COMPLETED | OUTPATIENT
Start: 2021-06-01 | End: 2021-06-01

## 2021-06-01 RX ORDER — BISACODYL 10 MG
10 SUPPOSITORY, RECTAL RECTAL
Status: DISCONTINUED | OUTPATIENT
Start: 2021-06-01 | End: 2021-06-03 | Stop reason: HOSPADM

## 2021-06-01 RX ORDER — PROCHLORPERAZINE EDISYLATE 5 MG/ML
5-10 INJECTION INTRAMUSCULAR; INTRAVENOUS EVERY 4 HOURS PRN
Status: DISCONTINUED | OUTPATIENT
Start: 2021-06-01 | End: 2021-06-03 | Stop reason: HOSPADM

## 2021-06-01 RX ORDER — LORAZEPAM 2 MG/ML
1.5 INJECTION INTRAMUSCULAR
Status: DISCONTINUED | OUTPATIENT
Start: 2021-06-01 | End: 2021-06-03 | Stop reason: HOSPADM

## 2021-06-01 RX ORDER — LORAZEPAM 2 MG/ML
1 INJECTION INTRAMUSCULAR
Status: DISCONTINUED | OUTPATIENT
Start: 2021-06-01 | End: 2021-06-03 | Stop reason: HOSPADM

## 2021-06-01 RX ORDER — OMEPRAZOLE 20 MG/1
20 CAPSULE, DELAYED RELEASE ORAL DAILY
Status: DISCONTINUED | OUTPATIENT
Start: 2021-06-02 | End: 2021-06-03

## 2021-06-01 RX ORDER — NALTREXONE HYDROCHLORIDE 50 MG/1
50 TABLET, FILM COATED ORAL DAILY
Status: DISCONTINUED | OUTPATIENT
Start: 2021-06-02 | End: 2021-06-01

## 2021-06-01 RX ORDER — AMOXICILLIN 250 MG
2 CAPSULE ORAL 2 TIMES DAILY
Status: DISCONTINUED | OUTPATIENT
Start: 2021-06-02 | End: 2021-06-03 | Stop reason: HOSPADM

## 2021-06-01 RX ORDER — LORAZEPAM 2 MG/1
2 TABLET ORAL
Status: DISCONTINUED | OUTPATIENT
Start: 2021-06-01 | End: 2021-06-03 | Stop reason: HOSPADM

## 2021-06-01 RX ORDER — LORAZEPAM 1 MG/1
1 TABLET ORAL EVERY 4 HOURS PRN
Status: DISCONTINUED | OUTPATIENT
Start: 2021-06-01 | End: 2021-06-03 | Stop reason: HOSPADM

## 2021-06-01 RX ORDER — OXYCODONE HYDROCHLORIDE 5 MG/1
5 TABLET ORAL EVERY 4 HOURS PRN
Status: DISCONTINUED | OUTPATIENT
Start: 2021-06-01 | End: 2021-06-02

## 2021-06-01 RX ADMIN — SODIUM CHLORIDE, POTASSIUM CHLORIDE, SODIUM LACTATE AND CALCIUM CHLORIDE 1000 ML: 600; 310; 30; 20 INJECTION, SOLUTION INTRAVENOUS at 15:40

## 2021-06-01 RX ADMIN — LORAZEPAM 0.5 MG: 0.5 TABLET ORAL at 21:04

## 2021-06-01 RX ADMIN — SODIUM CHLORIDE: 9 INJECTION, SOLUTION INTRAVENOUS at 19:52

## 2021-06-01 RX ADMIN — LIDOCAINE HYDROCHLORIDE 30 ML: 20 SOLUTION OROPHARYNGEAL at 13:22

## 2021-06-01 RX ADMIN — LORAZEPAM 1.5 MG: 2 INJECTION INTRAMUSCULAR; INTRAVENOUS at 19:43

## 2021-06-01 RX ADMIN — CALCIUM GLUCONATE 1 G: 98 INJECTION, SOLUTION INTRAVENOUS at 16:46

## 2021-06-01 RX ADMIN — ONDANSETRON 4 MG: 2 INJECTION INTRAMUSCULAR; INTRAVENOUS at 13:22

## 2021-06-01 RX ADMIN — OXYCODONE 5 MG: 5 TABLET ORAL at 19:58

## 2021-06-01 ASSESSMENT — LIFESTYLE VARIABLES
EVER HAD A DRINK FIRST THING IN THE MORNING TO STEADY YOUR NERVES TO GET RID OF A HANGOVER: YES
AGITATION: NORMAL ACTIVITY
AUDITORY DISTURBANCES: NOT PRESENT
HEADACHE, FULLNESS IN HEAD: MODERATE
ALCOHOL_USE: YES
TOTAL SCORE: 6
ORIENTATION AND CLOUDING OF SENSORIUM: CANNOT DO SERIAL ADDITIONS OR IS UNCERTAIN ABOUT DATE
VISUAL DISTURBANCES: NOT PRESENT
DOES PATIENT WANT TO STOP DRINKING: YES
TOTAL SCORE: 4
TREMOR: SEVERE TREMOR, EVEN WITH ARMS NOT EXTENDED
PAROXYSMAL SWEATS: NO SWEAT VISIBLE
HEADACHE, FULLNESS IN HEAD: MODERATE
ANXIETY: MODERATELY ANXIOUS OR GUARDED, SO ANXIETY IS INFERRED
VISUAL DISTURBANCES: NOT PRESENT
HAVE PEOPLE ANNOYED YOU BY CRITICIZING YOUR DRINKING: YES
TOTAL SCORE: 4
DOES PATIENT WANT TO TALK TO SOMEONE ABOUT QUITTING: YES
AUDITORY DISTURBANCES: NOT PRESENT
TREMOR: TREMOR NOT VISIBLE BUT CAN BE FELT, FINGERTIP TO FINGERTIP
EVER FELT BAD OR GUILTY ABOUT YOUR DRINKING: YES
HOW MANY TIMES IN THE PAST YEAR HAVE YOU HAD 5 OR MORE DRINKS IN A DAY: 20
TOTAL SCORE: 16
NAUSEA AND VOMITING: NO NAUSEA AND NO VOMITING
ON A TYPICAL DAY WHEN YOU DRINK ALCOHOL HOW MANY DRINKS DO YOU HAVE: 5
ORIENTATION AND CLOUDING OF SENSORIUM: CANNOT DO SERIAL ADDITIONS OR IS UNCERTAIN ABOUT DATE
HAVE YOU EVER FELT YOU SHOULD CUT DOWN ON YOUR DRINKING: YES
NAUSEA AND VOMITING: MILD NAUSEA WITH NO VOMITING
TOTAL SCORE: 4
AVERAGE NUMBER OF DAYS PER WEEK YOU HAVE A DRINK CONTAINING ALCOHOL: 3
ANXIETY: NO ANXIETY (AT EASE)
AGITATION: NORMAL ACTIVITY
PAROXYSMAL SWEATS: BARELY PERCEPTIBLE SWEATING. PALMS MOIST
CONSUMPTION TOTAL: POSITIVE

## 2021-06-01 ASSESSMENT — ENCOUNTER SYMPTOMS
HEADACHES: 0
EYE REDNESS: 0
BACK PAIN: 0
CHILLS: 0
DIZZINESS: 0
NAUSEA: 0
ORTHOPNEA: 0
HEARTBURN: 0
WEIGHT LOSS: 0
DIARRHEA: 0
NECK PAIN: 0
EYE DISCHARGE: 0
SHORTNESS OF BREATH: 0
BLURRED VISION: 0
FEVER: 0
EYE PAIN: 0
ABDOMINAL PAIN: 1
STRIDOR: 0
VOMITING: 0
COUGH: 0
FOCAL WEAKNESS: 0
SEIZURES: 0
MYALGIAS: 0
PALPITATIONS: 0
SPUTUM PRODUCTION: 0

## 2021-06-01 ASSESSMENT — COGNITIVE AND FUNCTIONAL STATUS - GENERAL
WALKING IN HOSPITAL ROOM: A LITTLE
SUGGESTED CMS G CODE MODIFIER MOBILITY: CJ
CLIMB 3 TO 5 STEPS WITH RAILING: A LITTLE
HELP NEEDED FOR BATHING: A LITTLE
MOBILITY SCORE: 22
DRESSING REGULAR LOWER BODY CLOTHING: A LITTLE

## 2021-06-01 ASSESSMENT — FIBROSIS 4 INDEX
FIB4 SCORE: 1.9
FIB4 SCORE: 0.76

## 2021-06-01 ASSESSMENT — PAIN DESCRIPTION - PAIN TYPE: TYPE: ACUTE PAIN

## 2021-06-01 NOTE — ED TRIAGE NOTES
"Pt BIB [facility] with c/c of drug overdose. Pt reporting she took 5 trazodone this morning because \"she wanted to sleep\" pt reporting generalized abd pain \"for a long time\" pt also reporting ETOH use today. Pt denies SI  "

## 2021-06-01 NOTE — ED PROVIDER NOTES
"ED Provider Note    Scribed for Ming Gray M.D. by Juan Carlos Cortez. 6/1/2021  12:58 PM    Primary care provider: David Coughlin M.D.  Means of arrival: EMS  History obtained from: Patient  History limited by: None    CHIEF COMPLAINT  Chief Complaint   Patient presents with    Drug Overdose    Alcohol Intoxication    Abdominal Pain       HPI  Donna Morales is a 40 y.o. female who presents to the Emergency Department via EMS for evaluation of upper abdominal pain secondary to a drug overdose onset prior to arrival. Patient states that she took approximately 5 100 mg pills of trazodone so that she could \"go to sleep\" at around 9 AM this morning. Denies any plans for suicide. Patient additionally notes taking \"2-3 sips of vodka\" daily for a month, with her last alcohol use being last night at around 2 AM. She has tried alleviating her symptoms with Ibuprofen for the pain, reporting that she has been taking over 2 pills twice a day for the past month Currently in the ED, the patient rates her pain a 20/10. She reports associated symptoms of nausea, vomiting, diarrhea, shakiness, anxiety, and irritability. Patient has also had blood in her vomit and stool which has since resolved. Denies sweating or dysuria. No alleviating or exacerbating factors reported. Patient has had similar abdominal pain symptoms in 2006 and had a cholecystectomy. She has seen a GI physician in the past.    REVIEW OF SYSTEMS  Pertinent positives include abdominal pain, nausea, diarrhea, shakiness, anxiety, irritability. Pertinent negatives include no sweating, vomiting, or dysuria. All other systems reviewed and negative.    PAST MEDICAL HISTORY   has a past medical history of Alcoholism (HCC), Anemia, Anesthesia, Anxiety, Dialysis (2008), Heart burn, Hypertension, Kidney failure (2008), Liver failure (HCC), Other specified symptom associated with female genital organs, Pancreatitis, and Wrist pain.    SURGICAL HISTORY   " has a past surgical history that includes lissa by laparoscopy (2006); laparoscopy (2000); and flexor tendon repair (7/18/2012).    SOCIAL HISTORY  Social History     Tobacco Use    Smoking status: Never Smoker    Smokeless tobacco: Never Used   Vaping Use    Vaping Use: Never used   Substance Use Topics    Alcohol use: Yes    Drug use: Not Currently      Social History     Substance and Sexual Activity   Drug Use Not Currently       FAMILY HISTORY  Family History   Problem Relation Age of Onset    Alcohol abuse Father     Drug abuse Father     Diabetes Other     Stroke Other     Hypertension Other     Alcohol abuse Paternal Grandfather     Psychiatric Illness Paternal Grandmother         agoraphobia    Alcohol abuse Paternal Grandmother     Alcohol abuse Paternal Aunt     Drug abuse Paternal Aunt     Stroke Maternal Grandmother        CURRENT MEDICATIONS  Current Outpatient Medications   Medication Instructions    FLUoxetine (PROZAC) 20 mg, Oral, DAILY    gabapentin (NEURONTIN) 300 mg, Oral, 3 TIMES DAILY    hydrOXYzine pamoate (VISTARIL) 50 mg, Oral, 2 TIMES DAILY PRN    naltrexone (DEPADE) 50 mg, Oral, DAILY    traZODone (DESYREL) 100 mg, Oral, EVERY BEDTIME PRN     ALLERGIES  No Known Allergies    PHYSICAL EXAM  VITAL SIGNS: /82   Pulse 97   Resp 14   Wt 68 kg (150 lb)   SpO2 95%   BMI 26.57 kg/m²     Constitutional: Well developed, Well nourished, No acute distress distress, Somewhat disheveled appearance.  HENT: Normocephalic, Atraumatic, Bilateral external ears normal, Oropharynx moist.   Eyes: PERRLA, EOMI, Conjunctiva normal, No discharge.   Neck: Full ROM.   Cardiovascular: Normal heart rate, Normal rhythm.   Thorax & Lungs: Clear to auscultation bilaterally, No respiratory distress, No wheezing, No crackles.   Abdomen: Soft, No tenderness, No masses, No pulsatile masses.   Skin: Warm, Dry, No erythema, No rash.   Musculoskeletal: No major deformities noted.    Neurologic: Awake, alert. Moves  all extremities spontaneously.  Psychiatric: Anxious mood with congruent affect.Poor judgement and insight.    LABS  Results for orders placed or performed during the hospital encounter of 06/01/21   HCG Qual Serum   Result Value Ref Range    Beta-Hcg Qualitative Serum Negative Negative   CBC WITH DIFFERENTIAL   Result Value Ref Range    WBC 3.8 (L) 4.8 - 10.8 K/uL    RBC 3.01 (L) 4.20 - 5.40 M/uL    Hemoglobin 8.5 (L) 12.0 - 16.0 g/dL    Hematocrit 27.4 (L) 37.0 - 47.0 %    MCV 91.0 81.4 - 97.8 fL    MCH 28.2 27.0 - 33.0 pg    MCHC 31.0 (L) 33.6 - 35.0 g/dL    RDW 61.1 (H) 35.9 - 50.0 fL    Platelet Count 186 164 - 446 K/uL    MPV 9.4 9.0 - 12.9 fL    Neutrophils-Polys 44.40 44.00 - 72.00 %    Lymphocytes 47.40 (H) 22.00 - 41.00 %    Monocytes 5.00 0.00 - 13.40 %    Eosinophils 1.30 0.00 - 6.90 %    Basophils 1.60 0.00 - 1.80 %    Immature Granulocytes 0.30 0.00 - 0.90 %    Nucleated RBC 0.00 /100 WBC    Neutrophils (Absolute) 1.70 (L) 2.00 - 7.15 K/uL    Lymphs (Absolute) 1.81 1.00 - 4.80 K/uL    Monos (Absolute) 0.19 0.00 - 0.85 K/uL    Eos (Absolute) 0.05 0.00 - 0.51 K/uL    Baso (Absolute) 0.06 0.00 - 0.12 K/uL    Immature Granulocytes (abs) 0.01 0.00 - 0.11 K/uL    NRBC (Absolute) 0.00 K/uL   COMP METABOLIC PANEL   Result Value Ref Range    Sodium 151 (H) 135 - 145 mmol/L    Potassium 3.4 (L) 3.6 - 5.5 mmol/L    Chloride 127 (H) 96 - 112 mmol/L    Co2 11 (L) 20 - 33 mmol/L    Anion Gap 13.0 7.0 - 16.0    Glucose 73 65 - 99 mg/dL    Bun 20 8 - 22 mg/dL    Creatinine 0.78 0.50 - 1.40 mg/dL    Calcium 6.1 (LL) 8.5 - 10.5 mg/dL    AST(SGOT) 31 12 - 45 U/L    ALT(SGPT) 13 2 - 50 U/L    Alkaline Phosphatase 80 30 - 99 U/L    Total Bilirubin <0.2 0.1 - 1.5 mg/dL    Albumin 2.8 (L) 3.2 - 4.9 g/dL    Total Protein 5.2 (L) 6.0 - 8.2 g/dL    Globulin 2.4 1.9 - 3.5 g/dL    A-G Ratio 1.2 g/dL   LIPASE   Result Value Ref Range    Lipase 5 (L) 11 - 82 U/L   DIAGNOSTIC ALCOHOL   Result Value Ref Range    Diagnostic Alcohol  291.2 (H) 0.0 - 10.0 mg/dL   Salicylate   Result Value Ref Range    Salicylates, Quant. <1 (L) 15 - 25 mg/dL   ACETAMINOPHEN   Result Value Ref Range    Acetaminophen -Tylenol <5 (L) 10 - 30 ug/mL   ESTIMATED GFR   Result Value Ref Range    GFR If African American >60 >60 mL/min/1.73 m 2    GFR If Non African American >60 >60 mL/min/1.73 m 2   IONIZED CALCIUM   Result Value Ref Range    Ionized Calcium 1.0 (L) 1.1 - 1.3 mmol/L        RADIOLOGY  No orders to display     The radiologist's interpretation of all radiological studies have been reviewed by me.      COURSE & MEDICAL DECISION MAKING  Pertinent Labs & Imaging studies reviewed. (See chart for details)    12:58 PM - Patient seen and examined at bedside. Patient will be treated with Zofran 4 mg and GI cocktail 30 mL. Ordered Urine drug screen, hCG qual serum, CBC with diff, CMP, Lipase, diagnostic alcohol, salicylate, and acetaminophen to evaluate her symptoms. The differential diagnoses include but are not limited to: alcohol abuse vs gastritis vs liver failure vs depression    1:16 PM - I reevaluated the patient and informed her with a further plan of treatment and care. Patient understands and verbalizes agreement to the plan of care.    3:30 PM - Patient was reevaluated at bedside. Ordered for ionized calcium for further evaluation. Patient will be treated with lactated ringers infusion.    4:35 PM - Patient was reevaluated at bedside. Ordered BMP for further evaluation. Patient will be treated with calcium gluconate 1 g in dextrose 5% 100 mL.       Decision Making:  Patient with chronic alcoholism, anemia, hypocalcemia.  The patient is complaining of abdominal pain, questionable trazodone overdose.  Laboratory tests are unremarkable except for hypokalemia, the patient is extremely acidemic with a CO2 of 11.  Fluid resuscitated the patient, give the patient calcium gluconate, the patient will need to be admitted to the hospital for metabolic derangement.   Discussed the case with the hospitalist for hospitalization.    CRITICAL CARE  The very real possibilty of a deterioration of this patient's condition required the highest level of my preparedness for sudden, emergent intervention.  I provided critical care services, which included medication orders, frequent reevaluations of the patient's condition and response to treatment, ordering and reviewing test results, and discussing the case with various consultants.  The critical care time associated with the care of the patient was 35 minutes. Review chart for interventions. This time is exclusive of any other billable procedures.       HYDRATION: Based on the patient's presentation of Dehydration the patient was given IV fluids. IV Hydration was used because oral hydration was not adequate alone. Upon recheck following hydration, the patient was improved.      FINAL IMPRESSION  1. Dehydration    2. Acidemia    3. Hypocalcemia    4. Epigastric pain    5. Alcohol abuse          Juan Carlos HARDIN (Scribernst), am scribing for, and in the presence of, Ming Gray M.D..    Electronically signed by: Juan Carlos Cortez (Angelika), 6/1/2021    Ming HARDIN M.D. personally performed the services described in this documentation, as scribed by Juan Carlos Cortez in my presence, and it is both accurate and complete.    The note accurately reflects work and decisions made by me.  Ming Gray M.D.  6/1/2021  6:53 PM    C

## 2021-06-01 NOTE — ED NOTES
Med Rec completed per patient   Allergies reviewed  No ORAL antibiotics in last 14 days    Patient states that she took 4-5 tabs of Trazodone this AM

## 2021-06-02 PROBLEM — E83.39 HYPOPHOSPHATEMIA: Status: ACTIVE | Noted: 2021-06-02

## 2021-06-02 LAB
ALBUMIN SERPL BCP-MCNC: 3.5 G/DL (ref 3.2–4.9)
ALBUMIN/GLOB SERPL: 1.3 G/DL
ALP SERPL-CCNC: 99 U/L (ref 30–99)
ALT SERPL-CCNC: 21 U/L (ref 2–50)
ANION GAP SERPL CALC-SCNC: 10 MMOL/L (ref 7–16)
ANION GAP SERPL CALC-SCNC: 11 MMOL/L (ref 7–16)
AST SERPL-CCNC: 65 U/L (ref 12–45)
BILIRUB SERPL-MCNC: 0.3 MG/DL (ref 0.1–1.5)
BUN SERPL-MCNC: 10 MG/DL (ref 8–22)
BUN SERPL-MCNC: 14 MG/DL (ref 8–22)
CALCIUM SERPL-MCNC: 8.1 MG/DL (ref 8.5–10.5)
CALCIUM SERPL-MCNC: 8.1 MG/DL (ref 8.5–10.5)
CHLORIDE SERPL-SCNC: 114 MMOL/L (ref 96–112)
CHLORIDE SERPL-SCNC: 114 MMOL/L (ref 96–112)
CO2 SERPL-SCNC: 17 MMOL/L (ref 20–33)
CO2 SERPL-SCNC: 20 MMOL/L (ref 20–33)
CREAT SERPL-MCNC: 0.96 MG/DL (ref 0.5–1.4)
CREAT SERPL-MCNC: 1.04 MG/DL (ref 0.5–1.4)
ERYTHROCYTE [DISTWIDTH] IN BLOOD BY AUTOMATED COUNT: 60.8 FL (ref 35.9–50)
FOLATE SERPL-MCNC: >40 NG/ML
GLOBULIN SER CALC-MCNC: 2.6 G/DL (ref 1.9–3.5)
GLUCOSE SERPL-MCNC: 128 MG/DL (ref 65–99)
GLUCOSE SERPL-MCNC: 91 MG/DL (ref 65–99)
HCT VFR BLD AUTO: 30.3 % (ref 37–47)
HGB BLD-MCNC: 9.3 G/DL (ref 12–16)
LACTATE BLD-SCNC: 1.7 MMOL/L (ref 0.5–2)
LACTATE BLD-SCNC: 1.9 MMOL/L (ref 0.5–2)
LACTATE BLD-SCNC: 1.9 MMOL/L (ref 0.5–2)
LACTATE BLD-SCNC: 3.2 MMOL/L (ref 0.5–2)
MAGNESIUM SERPL-MCNC: 1.4 MG/DL (ref 1.5–2.5)
MCH RBC QN AUTO: 28.2 PG (ref 27–33)
MCHC RBC AUTO-ENTMCNC: 30.7 G/DL (ref 33.6–35)
MCV RBC AUTO: 91.8 FL (ref 81.4–97.8)
PHOSPHATE SERPL-MCNC: 1.9 MG/DL (ref 2.5–4.5)
PLATELET # BLD AUTO: 218 K/UL (ref 164–446)
PMV BLD AUTO: 10 FL (ref 9–12.9)
POTASSIUM SERPL-SCNC: 4.1 MMOL/L (ref 3.6–5.5)
POTASSIUM SERPL-SCNC: 4.6 MMOL/L (ref 3.6–5.5)
PROT SERPL-MCNC: 6.1 G/DL (ref 6–8.2)
RBC # BLD AUTO: 3.3 M/UL (ref 4.2–5.4)
SARS-COV-2 RNA RESP QL NAA+PROBE: NOTDETECTED
SODIUM SERPL-SCNC: 142 MMOL/L (ref 135–145)
SODIUM SERPL-SCNC: 144 MMOL/L (ref 135–145)
SPECIMEN SOURCE: NORMAL
VIT B12 SERPL-MCNC: 449 PG/ML (ref 211–911)
WBC # BLD AUTO: 4.4 K/UL (ref 4.8–10.8)

## 2021-06-02 PROCEDURE — 36415 COLL VENOUS BLD VENIPUNCTURE: CPT

## 2021-06-02 PROCEDURE — 700111 HCHG RX REV CODE 636 W/ 250 OVERRIDE (IP): Performed by: INTERNAL MEDICINE

## 2021-06-02 PROCEDURE — 700111 HCHG RX REV CODE 636 W/ 250 OVERRIDE (IP): Performed by: STUDENT IN AN ORGANIZED HEALTH CARE EDUCATION/TRAINING PROGRAM

## 2021-06-02 PROCEDURE — 82746 ASSAY OF FOLIC ACID SERUM: CPT

## 2021-06-02 PROCEDURE — 700101 HCHG RX REV CODE 250: Performed by: STUDENT IN AN ORGANIZED HEALTH CARE EDUCATION/TRAINING PROGRAM

## 2021-06-02 PROCEDURE — 99233 SBSQ HOSP IP/OBS HIGH 50: CPT | Mod: GC | Performed by: INTERNAL MEDICINE

## 2021-06-02 PROCEDURE — 84100 ASSAY OF PHOSPHORUS: CPT

## 2021-06-02 PROCEDURE — 83735 ASSAY OF MAGNESIUM: CPT

## 2021-06-02 PROCEDURE — 83605 ASSAY OF LACTIC ACID: CPT

## 2021-06-02 PROCEDURE — 80053 COMPREHEN METABOLIC PANEL: CPT

## 2021-06-02 PROCEDURE — A9270 NON-COVERED ITEM OR SERVICE: HCPCS | Performed by: INTERNAL MEDICINE

## 2021-06-02 PROCEDURE — 82607 VITAMIN B-12: CPT

## 2021-06-02 PROCEDURE — 770020 HCHG ROOM/CARE - TELE (206)

## 2021-06-02 PROCEDURE — 700105 HCHG RX REV CODE 258: Performed by: INTERNAL MEDICINE

## 2021-06-02 PROCEDURE — 700102 HCHG RX REV CODE 250 W/ 637 OVERRIDE(OP): Performed by: INTERNAL MEDICINE

## 2021-06-02 PROCEDURE — 700105 HCHG RX REV CODE 258: Performed by: STUDENT IN AN ORGANIZED HEALTH CARE EDUCATION/TRAINING PROGRAM

## 2021-06-02 PROCEDURE — 700102 HCHG RX REV CODE 250 W/ 637 OVERRIDE(OP): Performed by: STUDENT IN AN ORGANIZED HEALTH CARE EDUCATION/TRAINING PROGRAM

## 2021-06-02 PROCEDURE — 85027 COMPLETE CBC AUTOMATED: CPT

## 2021-06-02 PROCEDURE — 80048 BASIC METABOLIC PNL TOTAL CA: CPT

## 2021-06-02 PROCEDURE — A9270 NON-COVERED ITEM OR SERVICE: HCPCS | Performed by: STUDENT IN AN ORGANIZED HEALTH CARE EDUCATION/TRAINING PROGRAM

## 2021-06-02 RX ORDER — SODIUM CHLORIDE, SODIUM LACTATE, POTASSIUM CHLORIDE, CALCIUM CHLORIDE 600; 310; 30; 20 MG/100ML; MG/100ML; MG/100ML; MG/100ML
INJECTION, SOLUTION INTRAVENOUS CONTINUOUS
Status: DISCONTINUED | OUTPATIENT
Start: 2021-06-02 | End: 2021-06-03

## 2021-06-02 RX ORDER — MORPHINE SULFATE 4 MG/ML
2 INJECTION, SOLUTION INTRAMUSCULAR; INTRAVENOUS EVERY 4 HOURS PRN
Status: DISCONTINUED | OUTPATIENT
Start: 2021-06-02 | End: 2021-06-03 | Stop reason: HOSPADM

## 2021-06-02 RX ORDER — MAGNESIUM SULFATE HEPTAHYDRATE 40 MG/ML
4 INJECTION, SOLUTION INTRAVENOUS ONCE
Status: COMPLETED | OUTPATIENT
Start: 2021-06-02 | End: 2021-06-02

## 2021-06-02 RX ORDER — GAUZE BANDAGE 2" X 2"
100 BANDAGE TOPICAL 3 TIMES DAILY
Status: DISCONTINUED | OUTPATIENT
Start: 2021-06-02 | End: 2021-06-03 | Stop reason: HOSPADM

## 2021-06-02 RX ORDER — FOLIC ACID 1 MG/1
1 TABLET ORAL DAILY
Status: DISCONTINUED | OUTPATIENT
Start: 2021-06-03 | End: 2021-06-03 | Stop reason: HOSPADM

## 2021-06-02 RX ADMIN — LORAZEPAM 0.5 MG: 0.5 TABLET ORAL at 01:39

## 2021-06-02 RX ADMIN — Medication 100 MG: at 11:52

## 2021-06-02 RX ADMIN — LORAZEPAM 0.5 MG: 0.5 TABLET ORAL at 06:04

## 2021-06-02 RX ADMIN — Medication 100 MG: at 06:06

## 2021-06-02 RX ADMIN — ENOXAPARIN SODIUM 40 MG: 40 INJECTION SUBCUTANEOUS at 06:00

## 2021-06-02 RX ADMIN — OMEPRAZOLE 20 MG: 20 CAPSULE, DELAYED RELEASE ORAL at 06:04

## 2021-06-02 RX ADMIN — MORPHINE SULFATE 2 MG: 4 INJECTION INTRAVENOUS at 23:58

## 2021-06-02 RX ADMIN — SODIUM CHLORIDE, POTASSIUM CHLORIDE, SODIUM LACTATE AND CALCIUM CHLORIDE: 600; 310; 30; 20 INJECTION, SOLUTION INTRAVENOUS at 11:41

## 2021-06-02 RX ADMIN — SODIUM PHOSPHATE, MONOBASIC, MONOHYDRATE AND SODIUM PHOSPHATE, DIBASIC, ANHYDROUS 30 MMOL: 276; 142 INJECTION, SOLUTION INTRAVENOUS at 12:53

## 2021-06-02 RX ADMIN — SODIUM CHLORIDE: 9 INJECTION, SOLUTION INTRAVENOUS at 05:58

## 2021-06-02 RX ADMIN — FOLIC ACID 1 MG: 1 TABLET ORAL at 06:00

## 2021-06-02 RX ADMIN — MORPHINE SULFATE 2 MG: 4 INJECTION INTRAVENOUS at 11:38

## 2021-06-02 RX ADMIN — MORPHINE SULFATE 2 MG: 4 INJECTION INTRAVENOUS at 15:25

## 2021-06-02 RX ADMIN — OXYCODONE 5 MG: 5 TABLET ORAL at 06:07

## 2021-06-02 RX ADMIN — Medication 100 MG: at 18:00

## 2021-06-02 RX ADMIN — MORPHINE SULFATE 2 MG: 4 INJECTION INTRAVENOUS at 19:54

## 2021-06-02 RX ADMIN — MAGNESIUM SULFATE IN WATER 4 G: 40 INJECTION, SOLUTION INTRAVENOUS at 15:20

## 2021-06-02 RX ADMIN — THERA TABS 1 TABLET: TAB at 06:03

## 2021-06-02 ASSESSMENT — LIFESTYLE VARIABLES
PAROXYSMAL SWEATS: NO SWEAT VISIBLE
NAUSEA AND VOMITING: NO NAUSEA AND NO VOMITING
AUDITORY DISTURBANCES: NOT PRESENT
AGITATION: NORMAL ACTIVITY
NAUSEA AND VOMITING: MILD NAUSEA WITH NO VOMITING
ANXIETY: NO ANXIETY (AT EASE)
VISUAL DISTURBANCES: NOT PRESENT
TOTAL SCORE: 4
HEADACHE, FULLNESS IN HEAD: NOT PRESENT
VISUAL DISTURBANCES: NOT PRESENT
HEADACHE, FULLNESS IN HEAD: MILD
AGITATION: NORMAL ACTIVITY
ANXIETY: MILDLY ANXIOUS
PAROXYSMAL SWEATS: NO SWEAT VISIBLE
AUDITORY DISTURBANCES: NOT PRESENT
TOTAL SCORE: 5
ORIENTATION AND CLOUDING OF SENSORIUM: ORIENTED AND CAN DO SERIAL ADDITIONS
TREMOR: TREMOR NOT VISIBLE BUT CAN BE FELT, FINGERTIP TO FINGERTIP
TREMOR: *
ORIENTATION AND CLOUDING OF SENSORIUM: CANNOT DO SERIAL ADDITIONS OR IS UNCERTAIN ABOUT DATE

## 2021-06-02 ASSESSMENT — ENCOUNTER SYMPTOMS
NAUSEA: 0
PALPITATIONS: 0
DIARRHEA: 0
FOCAL WEAKNESS: 0
FEVER: 0
CLAUDICATION: 0
TINGLING: 0
WEAKNESS: 0
SHORTNESS OF BREATH: 0
EYES NEGATIVE: 1
PSYCHIATRIC NEGATIVE: 1
COUGH: 0
VOMITING: 0
MUSCULOSKELETAL NEGATIVE: 1
ABDOMINAL PAIN: 1
WHEEZING: 0
HEADACHES: 1
CHILLS: 0
CONSTIPATION: 0

## 2021-06-02 ASSESSMENT — COGNITIVE AND FUNCTIONAL STATUS - GENERAL
SUGGESTED CMS G CODE MODIFIER MOBILITY: CJ
CLIMB 3 TO 5 STEPS WITH RAILING: A LITTLE
TOILETING: A LITTLE
MOBILITY SCORE: 22
DRESSING REGULAR LOWER BODY CLOTHING: A LITTLE
WALKING IN HOSPITAL ROOM: A LITTLE
SUGGESTED CMS G CODE MODIFIER DAILY ACTIVITY: CJ
HELP NEEDED FOR BATHING: A LITTLE
DAILY ACTIVITIY SCORE: 21

## 2021-06-02 ASSESSMENT — PATIENT HEALTH QUESTIONNAIRE - PHQ9
1. LITTLE INTEREST OR PLEASURE IN DOING THINGS: NOT AT ALL
SUM OF ALL RESPONSES TO PHQ9 QUESTIONS 1 AND 2: 0
2. FEELING DOWN, DEPRESSED, IRRITABLE, OR HOPELESS: NOT AT ALL

## 2021-06-02 ASSESSMENT — PAIN DESCRIPTION - PAIN TYPE: TYPE: ACUTE PAIN

## 2021-06-02 ASSESSMENT — FIBROSIS 4 INDEX: FIB4 SCORE: 1.9

## 2021-06-02 NOTE — ASSESSMENT & PLAN NOTE
Patient presented to ED with Na of 151 (now improved to 142). Likely related to dehydration from alcohol use, nausea, and vomiting. Continue to monitor, being careful not to correct too fast.  -telemetry  -mIVF 100 cc/hr LR  -repeat CBC/CMP in AM  -encourage PO rehydration and food intake

## 2021-06-02 NOTE — PROGRESS NOTES
Daily Progress Note:     Date of Service: 6/2/2021  Primary Team: [facility] Blue Team   Attending: Bryant Grubbs M.D.   Senior Resident: Dr. Wells  Intern: Dr. Garcia  Contact:  [phone]    Chief Complaint:    Alcohol withdrawal, epigastric pain    ID:   Donna Morales is a 40 y.o. female who presented to ED on 6/1/2021 for epigastric abdominal pain, and alcohol withdrawal.    Subjective:  No acute overnight events. Overnight CIWAs were 16, 6, and 5, and patient received total of 3 mg Ativan overnight. This AM, patient is somnolent and difficult to interview, however she states she has improved abdominal pain, but still present. She denies any further nausea or vomiting, no weakness, fatigue, CP, SOB, bowel/bladder dysfunction, or vision changes. Does endorse mild GALLARDO, but denies feeling tremulous, or having auditory/visual hallucinations.    Consultants/Specialty:  None    Review of Systems:    Review of Systems   Constitutional: Negative for chills, fever and malaise/fatigue.   HENT: Negative.    Eyes: Negative.    Respiratory: Negative for cough, shortness of breath and wheezing.    Cardiovascular: Negative for chest pain, palpitations, claudication and leg swelling.   Gastrointestinal: Positive for abdominal pain. Negative for constipation, diarrhea, nausea and vomiting.   Genitourinary: Negative.    Musculoskeletal: Negative.    Skin: Negative.    Neurological: Positive for headaches. Negative for tingling, focal weakness and weakness.   Endo/Heme/Allergies: Negative.    Psychiatric/Behavioral: Negative.        Objective Data:   Physical Exam:   Vitals:   Temp:  [36.6 °C (97.8 °F)-36.9 °C (98.4 °F)] 36.7 °C (98.1 °F)  Pulse:  [] 93  Resp:  [14-24] 17  BP: ()/(59-88) 113/69  SpO2:  [91 %-95 %] 92 %    Physical Exam  Constitutional:       Appearance: Normal appearance.   HENT:      Head: Normocephalic and atraumatic.      Nose: Nose normal.      Mouth/Throat:      Mouth: Mucous  membranes are dry.      Pharynx: Oropharynx is clear.   Eyes:      Extraocular Movements: Extraocular movements intact.      Conjunctiva/sclera: Conjunctivae normal.      Pupils: Pupils are equal, round, and reactive to light.   Cardiovascular:      Rate and Rhythm: Normal rate and regular rhythm.      Pulses: Normal pulses.      Heart sounds: Normal heart sounds.   Pulmonary:      Effort: Pulmonary effort is normal.      Breath sounds: Normal breath sounds. No wheezing.   Abdominal:      General: Abdomen is flat. Bowel sounds are normal.      Palpations: Abdomen is soft.      Tenderness: There is abdominal tenderness. There is no guarding or rebound.   Musculoskeletal:         General: Normal range of motion.      Right lower leg: No edema.      Left lower leg: No edema.   Skin:     General: Skin is warm and dry.      Capillary Refill: Capillary refill takes less than 2 seconds.   Neurological:      General: No focal deficit present.      Mental Status: She is alert and oriented to person, place, and time. Mental status is at baseline.   Psychiatric:         Mood and Affect: Mood normal.         Behavior: Behavior normal.         Labs:   Recent Results (from the past 24 hour(s))   IONIZED CALCIUM    Collection Time: 06/01/21  3:49 PM   Result Value Ref Range    Ionized Calcium 1.0 (L) 1.1 - 1.3 mmol/L   Basic Metabolic Panel    Collection Time: 06/01/21  6:22 PM   Result Value Ref Range    Sodium 144 135 - 145 mmol/L    Potassium 4.3 3.6 - 5.5 mmol/L    Chloride 115 (H) 96 - 112 mmol/L    Co2 15 (L) 20 - 33 mmol/L    Glucose 80 65 - 99 mg/dL    Bun 21 8 - 22 mg/dL    Creatinine 0.99 0.50 - 1.40 mg/dL    Calcium 8.6 8.5 - 10.5 mg/dL    Anion Gap 14.0 7.0 - 16.0   ESTIMATED GFR    Collection Time: 06/01/21  6:22 PM   Result Value Ref Range    GFR If African American >60 >60 mL/min/1.73 m 2    GFR If Non African American >60 >60 mL/min/1.73 m 2   SARS-CoV-2 PCR (24 hour In-House): Collect NP swab in VTM    Collection  Time: 06/01/21  6:57 PM    Specimen: Respirate   Result Value Ref Range    SARS-CoV-2 Source NP Swab     SARS-CoV-2 by PCR NotDetected    CBC without Differential    Collection Time: 06/02/21  4:26 AM   Result Value Ref Range    WBC 4.4 (L) 4.8 - 10.8 K/uL    RBC 3.30 (L) 4.20 - 5.40 M/uL    Hemoglobin 9.3 (L) 12.0 - 16.0 g/dL    Hematocrit 30.3 (L) 37.0 - 47.0 %    MCV 91.8 81.4 - 97.8 fL    MCH 28.2 27.0 - 33.0 pg    MCHC 30.7 (L) 33.6 - 35.0 g/dL    RDW 60.8 (H) 35.9 - 50.0 fL    Platelet Count 218 164 - 446 K/uL    MPV 10.0 9.0 - 12.9 fL   Comp Metabolic Panel (CMP)    Collection Time: 06/02/21  4:26 AM   Result Value Ref Range    Sodium 142 135 - 145 mmol/L    Potassium 4.6 3.6 - 5.5 mmol/L    Chloride 114 (H) 96 - 112 mmol/L    Co2 17 (L) 20 - 33 mmol/L    Anion Gap 11.0 7.0 - 16.0    Glucose 91 65 - 99 mg/dL    Bun 14 8 - 22 mg/dL    Creatinine 1.04 0.50 - 1.40 mg/dL    Calcium 8.1 (L) 8.5 - 10.5 mg/dL    AST(SGOT) 65 (H) 12 - 45 U/L    ALT(SGPT) 21 2 - 50 U/L    Alkaline Phosphatase 99 30 - 99 U/L    Total Bilirubin 0.3 0.1 - 1.5 mg/dL    Albumin 3.5 3.2 - 4.9 g/dL    Total Protein 6.1 6.0 - 8.2 g/dL    Globulin 2.6 1.9 - 3.5 g/dL    A-G Ratio 1.3 g/dL   ESTIMATED GFR    Collection Time: 06/02/21  4:26 AM   Result Value Ref Range    GFR If African American >60 >60 mL/min/1.73 m 2    GFR If Non African American 58 (A) >60 mL/min/1.73 m 2   FOLATE    Collection Time: 06/02/21  9:58 AM   Result Value Ref Range    Folate -Folic Acid >40.0 >4.0 ng/mL   MAGNESIUM    Collection Time: 06/02/21  9:58 AM   Result Value Ref Range    Magnesium 1.4 (L) 1.5 - 2.5 mg/dL   PHOSPHORUS    Collection Time: 06/02/21  9:58 AM   Result Value Ref Range    Phosphorus 1.9 (L) 2.5 - 4.5 mg/dL   LACTIC ACID    Collection Time: 06/02/21  9:58 AM   Result Value Ref Range    Lactic Acid 3.2 (H) 0.5 - 2.0 mmol/L   LACTIC ACID    Collection Time: 06/02/21  1:43 PM   Result Value Ref Range    Lactic Acid 1.7 0.5 - 2.0 mmol/L   Basic  Metabolic Panel    Collection Time: 06/02/21  1:43 PM   Result Value Ref Range    Sodium 144 135 - 145 mmol/L    Potassium 4.1 3.6 - 5.5 mmol/L    Chloride 114 (H) 96 - 112 mmol/L    Co2 20 20 - 33 mmol/L    Glucose 128 (H) 65 - 99 mg/dL    Bun 10 8 - 22 mg/dL    Creatinine 0.96 0.50 - 1.40 mg/dL    Calcium 8.1 (L) 8.5 - 10.5 mg/dL    Anion Gap 10.0 7.0 - 16.0   ESTIMATED GFR    Collection Time: 06/02/21  1:43 PM   Result Value Ref Range    GFR If African American >60 >60 mL/min/1.73 m 2    GFR If Non African American >60 >60 mL/min/1.73 m 2       Imaging:   Independant Imaging Review: Completed  No orders to display       Problem Representation:    Donna Morales is a 40 y.o. female who presented to ED on 6/1/2021 for epigastric abdominal pain, and alcohol withdrawal. She has previous history of alcohol withdrawal but denies seizures. This AM patient had low phosphate and magnesium, but elevated Na which is now normalizing. If patient remains stable and electrolyte abnormalities resolve, possible DC tomorrow.    * Alcohol use disorder, severe, dependence (HCC)- (present on admission)  Assessment & Plan  Patient came in with alcohol withdrawal with symptoms of abdominal pain and tremulousness. Of note, patient previously hospitalized for alcohol withdrawal.  -Telemetry  -[facility] protocol  -daily CBC/CMP to track electrolyte abnormalities  -folate, thiamine, and multivitamin supplementation  -Cessation education when medically appropriate  -Aspiration, fall, seizure precaution    Hypernatremia  Assessment & Plan  Patient presented to ED with Na of 151 (now improved to 142). Likely related to dehydration from alcohol use, nausea, and vomiting. Continue to monitor, being careful not to correct too fast.  -telemetry  -mIVF 100 cc/hr LR  -repeat CBC/CMP in AM  -encourage PO rehydration and food intake    Epigastric pain  Assessment & Plan  Could be related to alcoholic gastritis/peptic ulcer disease. Lipase level  was normal, but FOBT to be ordered for further work up.  -continue omeprazole 20 mg  -IV morphine 2 mg q4h for abdominal pain  -CTM with CBC to track Hgb/Hct    High anion gap metabolic acidosis- (present on admission)  Assessment & Plan  RESOLVING. Patient initially presented with low serum bicarb, and elevated AG likely 2/2 alcohol use; resolving with normal saline that was switched to LR to prevent NAGMA.  -continue to monitor  -mIVF  cc/hr  -repeat CBC/CMP in AM    Hypophosphatemia  Assessment & Plan  Labs this AM demonstrate phosphorous of 1.9. Will replete, CTM.  -replete with 30 mmol IV NaPhos  -repeat CMP in AM  -monitor for refeeding syndrome    Hypomagnesemia  Assessment & Plan  Magnesium this AM low at 1.4. Will replete, CTM  -replete with 4 g IV mag  -repeat CMP in AM    Anxiety disorder, unspecified- (present on admission)  Assessment & Plan  Continue outpatient medications

## 2021-06-02 NOTE — H&P
Hospital Medicine History & Physical Note    Date of Service  6/1/2021    Primary Care Physician  David Coughlin M.D.    Consultants  none    Code Status  Full Code    Chief Complaint  Chief Complaint   Patient presents with   • Drug Overdose   • Alcohol Intoxication   • Abdominal Pain       History of Presenting Illness  40 y.o. female with past medical history of chronic alcoholic, depression who presented 6/1/2021 with abdominal pain intermittently for the past few months.  She described the pain as sharp pain over the epigastric area, radiated to her left upper quadrant and sometimes to the back, 6 out of 10 intensity.  Denies any nausea and vomiting today.  Her abdominal pain sometimes can be triggered by food.  In addition the patient drink alcohol a lot every day and her last drink was this morning.  Also she tried to take 5 tablets of trazodone to sleep but denies any suicidal ideation.  She seems to be withdrawing from alcohol in ER.  She is very acidotic with CO2 11 in ER.  She will receive 2 unit of IV fluid bolus and then we will check her BMP again to follow-up on her CO2.  She will be admitted to telemetry floor for further management.    Review of Systems  Review of Systems   Constitutional: Negative for chills, fever and weight loss.   HENT: Negative for congestion and nosebleeds.    Eyes: Negative for blurred vision, pain, discharge and redness.   Respiratory: Negative for cough, sputum production, shortness of breath and stridor.    Cardiovascular: Negative for chest pain, palpitations and orthopnea.   Gastrointestinal: Positive for abdominal pain. Negative for diarrhea, heartburn, nausea and vomiting.   Genitourinary: Negative for dysuria, frequency and urgency.   Musculoskeletal: Negative for back pain, myalgias and neck pain.   Skin: Negative for itching and rash.   Neurological: Negative for dizziness, focal weakness, seizures and headaches.       Past Medical History   has a past  medical history of Alcoholism (HCC), Anemia, Anesthesia, Anxiety, Dialysis (2008), Heart burn, Hypertension, Kidney failure (2008), Liver failure (HCC), Other specified symptom associated with female genital organs, Pancreatitis, and Wrist pain.    Surgical History   has a past surgical history that includes lissa by laparoscopy (2006); laparoscopy (2000); and flexor tendon repair (7/18/2012).     Family History  family history includes Alcohol abuse in her father, paternal aunt, paternal grandfather, and paternal grandmother; Diabetes in an other family member; Drug abuse in her father and paternal aunt; Hypertension in an other family member; Psychiatric Illness in her paternal grandmother; Stroke in her maternal grandmother and another family member.     Social History   reports that she has never smoked. She has never used smokeless tobacco. She reports current alcohol use. She reports previous drug use.    Allergies  No Known Allergies    Medications  Prior to Admission Medications   Prescriptions Last Dose Informant Patient Reported? Taking?   FLUoxetine (PROZAC) 20 MG Cap NOT TAKING at NOT TAKING Patient No No   Sig: Take 1 Cap by mouth every day.   Patient not taking: Reported on 6/1/2021   Ibuprofen-diphenhydrAMINE HCl (ADVIL PM) 200-25 MG Cap 5/30/2021 at PM Patient Yes Yes   Sig: Take 2-4 Tablets by mouth at bedtime as needed.   Non Formulary Request 2-3 DAYS AGO at [facility] Patient Yes Yes   Sig: Take 1 tablet by mouth every 48 hours. Anxiety stress vitamin   gabapentin (NEURONTIN) 300 MG Cap NOT TAKING at NOT TAKING Patient No No   Sig: Take 1 Cap by mouth 3 times a day.   Patient not taking: Reported on 6/1/2021   hydrOXYzine pamoate (VISTARIL) 50 MG Cap NOT TAKING at NOT TAKING Patient No No   Sig: Take 1 Cap by mouth 2 times a day as needed for Anxiety.   Patient not taking: Reported on 6/1/2021   naltrexone (DEPADE) 50 MG Tab NOT TAKING at NOT TAKING Patient No No   Sig: Take 1 Tab by mouth every day.    Patient not taking: Reported on 6/1/2021   traZODone (DESYREL) 100 MG Tab 6/1/2021 at AM Patient No No   Sig: Take 1 Tab by mouth at bedtime as needed for Sleep.      Facility-Administered Medications: None       Physical Exam  Temp:  [36.6 °C (97.8 °F)] 36.6 °C (97.8 °F)  Pulse:  [88-97] 97  Resp:  [14-24] 24  BP: ()/(59-82) 113/68  SpO2:  [91 %-95 %] 94 %    Physical Exam  Vitals reviewed.   Constitutional:       General: She is not in acute distress.     Appearance: Normal appearance.   HENT:      Head: Normocephalic and atraumatic.      Nose: No congestion or rhinorrhea.   Eyes:      Extraocular Movements: Extraocular movements intact.      Pupils: Pupils are equal, round, and reactive to light.   Cardiovascular:      Rate and Rhythm: Normal rate and regular rhythm.      Pulses: Normal pulses.   Pulmonary:      Effort: Pulmonary effort is normal. No respiratory distress.      Breath sounds: Normal breath sounds.   Abdominal:      General: Bowel sounds are normal. There is no distension.      Palpations: Abdomen is soft.      Tenderness: There is abdominal tenderness.   Musculoskeletal:         General: No swelling or tenderness.      Cervical back: Normal range of motion and neck supple.   Skin:     General: Skin is warm.      Findings: No erythema.   Neurological:      General: No focal deficit present.      Mental Status: She is alert.         Laboratory:  Recent Labs     06/01/21  1323   WBC 3.8*   RBC 3.01*   HEMOGLOBIN 8.5*   HEMATOCRIT 27.4*   MCV 91.0   MCH 28.2   MCHC 31.0*   RDW 61.1*   PLATELETCT 186   MPV 9.4     Recent Labs     06/01/21  1323   SODIUM 151*   POTASSIUM 3.4*   CHLORIDE 127*   CO2 11*   GLUCOSE 73   BUN 20   CREATININE 0.78   CALCIUM 6.1*     Recent Labs     06/01/21  1323   ALTSGPT 13   ASTSGOT 31   ALKPHOSPHAT 80   TBILIRUBIN <0.2   LIPASE 5*   GLUCOSE 73         No results for input(s): NTPROBNP in the last 72 hours.      No results for input(s): TROPONINT in the last 72  hours.    Imaging:  No orders to display         Assessment/Plan:  I anticipate this patient will require at least two midnights for appropriate medical management, necessitating inpatient admission.    Epigastric pain  Assessment & Plan  Could be related to alcoholic gastritis/peptic ulcer disease  Lipase level was normal  And also noticed to have associated anemia  We'll check FOBT  Started on PPI    Hypernatremia  Assessment & Plan  Likely related to dehydration  On IV fluid  Follow CMP in the morning    Anxiety disorder, unspecified- (present on admission)  Assessment & Plan  Continue outpatient medications    Alcohol use disorder, severe, dependence (HCC)- (present on admission)  Assessment & Plan  [facility] protocol  Cessation education  Aspiration, fall, seizure precaution      High anion gap metabolic acidosis- (present on admission)  Assessment & Plan  Likely related to alcohol  Aggressive IV fluid  Follow CMP closely

## 2021-06-02 NOTE — PROGRESS NOTES
2 RN Skin Check    2 RN skin check complete with .   Devices in place: N/A.  Skin assessed under devices: N\A.  Confirmed pressure ulcers found on: N/A.  New potential pressure ulcers noted on N/A. Wound consult placed No.  The following interventions in place Pillows and Lotion.     Abrasion on bottom of right foot. Generalized bruising.

## 2021-06-02 NOTE — ASSESSMENT & PLAN NOTE
Could be related to alcoholic gastritis/peptic ulcer disease. Lipase level was normal, but FOBT to be ordered for further work up.  -continue omeprazole 20 mg  -IV morphine 2 mg q4h for abdominal pain  -CTM with CBC to track Hgb/Hct

## 2021-06-02 NOTE — ASSESSMENT & PLAN NOTE
Patient came in with alcohol withdrawal with symptoms of abdominal pain and tremulousness. Of note, patient previously hospitalized for alcohol withdrawal.  -Telemetry  -CIWA protocol  -daily CBC/CMP to track electrolyte abnormalities  -folate, thiamine, and multivitamin supplementation  -Cessation education when medically appropriate  -Aspiration, fall, seizure precaution

## 2021-06-02 NOTE — PROGRESS NOTES
Pt not calling before getting out of bed. Bed alarm in place, wrist band on. Pt is refusing socks. Pt has very unsteady gait and nearly fell when ambulating to bathroom with RN. Pt reeducated to to call for assistance before getting out of bed.

## 2021-06-02 NOTE — ASSESSMENT & PLAN NOTE
Labs this AM demonstrate phosphorous of 1.9. Will replete, CTM.  -replete with 30 mmol IV NaPhos  -repeat CMP in AM  -monitor for refeeding syndrome

## 2021-06-02 NOTE — CARE PLAN
The patient is Watcher - Medium risk of patient condition declining or worsening         Progress made toward(s) clinical / shift goals:  mild tremors. No pain stated, CIWA score of 4    Patient is not progressing towards the following goals:

## 2021-06-02 NOTE — PROGRESS NOTES
PT arrived to unit via gurney escorted by ACLS RN. VSS Pt is in sinus rhythm. PT A&O$. Monitor leads in place. Monitor room notified. PT c/o 8/10 abdominal pain. back pain. Pt is orientated to the unit, call light, phone system, fall precautions in place, appropriate signs in place, bed in low and locked positions, bed alarm on. Pt educated regarded fall precautions and importance of calling staff for assistance. Pt denies further needs at the time.

## 2021-06-02 NOTE — ASSESSMENT & PLAN NOTE
RESOLVING. Patient initially presented with low serum bicarb, and elevated AG likely 2/2 alcohol use; resolving with normal saline that was switched to LR to prevent NAGMA.  -continue to monitor  -mIVF  cc/hr  -repeat CBC/CMP in AM

## 2021-06-02 NOTE — CARE PLAN
Problem: Knowledge Deficit - Standard  Goal: Patient and family/care givers will demonstrate understanding of plan of care, disease process/condition, diagnostic tests and medications  Outcome: Progressing     Problem: Seizure Precautions  Goal: Implementation of seizure precautions  Outcome: Met     Problem: Psychosocial  Goal: Patient's level of anxiety will decrease  Outcome: Progressing     Problem: Fall Risk  Goal: Patient will remain free from falls  Outcome: Progressing

## 2021-06-02 NOTE — PROGRESS NOTES
Pt is requesting to talk to someone about drinking cessation. Spoke with ER  and was recommended to place order to .

## 2021-06-03 ENCOUNTER — PATIENT OUTREACH (OUTPATIENT)
Dept: HEALTH INFORMATION MANAGEMENT | Facility: OTHER | Age: 41
End: 2021-06-03

## 2021-06-03 VITALS
HEART RATE: 85 BPM | TEMPERATURE: 97.9 F | OXYGEN SATURATION: 93 % | BODY MASS INDEX: 29.99 KG/M2 | WEIGHT: 169.31 LBS | RESPIRATION RATE: 16 BRPM | DIASTOLIC BLOOD PRESSURE: 84 MMHG | SYSTOLIC BLOOD PRESSURE: 129 MMHG

## 2021-06-03 LAB
ALBUMIN SERPL BCP-MCNC: 3.5 G/DL (ref 3.2–4.9)
ALBUMIN/GLOB SERPL: 1.4 G/DL
ALP SERPL-CCNC: 108 U/L (ref 30–99)
ALT SERPL-CCNC: 20 U/L (ref 2–50)
ANION GAP SERPL CALC-SCNC: 8 MMOL/L (ref 7–16)
AST SERPL-CCNC: 46 U/L (ref 12–45)
BASOPHILS # BLD AUTO: 1 % (ref 0–1.8)
BASOPHILS # BLD: 0.04 K/UL (ref 0–0.12)
BILIRUB SERPL-MCNC: 0.3 MG/DL (ref 0.1–1.5)
BUN SERPL-MCNC: 7 MG/DL (ref 8–22)
CALCIUM SERPL-MCNC: 8.4 MG/DL (ref 8.5–10.5)
CHLORIDE SERPL-SCNC: 109 MMOL/L (ref 96–112)
CO2 SERPL-SCNC: 21 MMOL/L (ref 20–33)
CREAT SERPL-MCNC: 0.95 MG/DL (ref 0.5–1.4)
EOSINOPHIL # BLD AUTO: 0.19 K/UL (ref 0–0.51)
EOSINOPHIL NFR BLD: 4.6 % (ref 0–6.9)
ERYTHROCYTE [DISTWIDTH] IN BLOOD BY AUTOMATED COUNT: 59.9 FL (ref 35.9–50)
GLOBULIN SER CALC-MCNC: 2.5 G/DL (ref 1.9–3.5)
GLUCOSE SERPL-MCNC: 109 MG/DL (ref 65–99)
HCT VFR BLD AUTO: 32.2 % (ref 37–47)
HGB BLD-MCNC: 10 G/DL (ref 12–16)
IMM GRANULOCYTES # BLD AUTO: 0.01 K/UL (ref 0–0.11)
IMM GRANULOCYTES NFR BLD AUTO: 0.2 % (ref 0–0.9)
LACTATE BLD-SCNC: 1 MMOL/L (ref 0.5–2)
LACTATE BLD-SCNC: 1.2 MMOL/L (ref 0.5–2)
LYMPHOCYTES # BLD AUTO: 1.6 K/UL (ref 1–4.8)
LYMPHOCYTES NFR BLD: 38.4 % (ref 22–41)
MAGNESIUM SERPL-MCNC: 1.8 MG/DL (ref 1.5–2.5)
MCH RBC QN AUTO: 28.7 PG (ref 27–33)
MCHC RBC AUTO-ENTMCNC: 31.1 G/DL (ref 33.6–35)
MCV RBC AUTO: 92.3 FL (ref 81.4–97.8)
MONOCYTES # BLD AUTO: 0.31 K/UL (ref 0–0.85)
MONOCYTES NFR BLD AUTO: 7.4 % (ref 0–13.4)
NEUTROPHILS # BLD AUTO: 2.02 K/UL (ref 2–7.15)
NEUTROPHILS NFR BLD: 48.4 % (ref 44–72)
NRBC # BLD AUTO: 0 K/UL
NRBC BLD-RTO: 0 /100 WBC
PHOSPHATE SERPL-MCNC: 2.2 MG/DL (ref 2.5–4.5)
PLATELET # BLD AUTO: 213 K/UL (ref 164–446)
PMV BLD AUTO: 10.1 FL (ref 9–12.9)
POTASSIUM SERPL-SCNC: 4.2 MMOL/L (ref 3.6–5.5)
PROT SERPL-MCNC: 6 G/DL (ref 6–8.2)
RBC # BLD AUTO: 3.49 M/UL (ref 4.2–5.4)
SODIUM SERPL-SCNC: 138 MMOL/L (ref 135–145)
WBC # BLD AUTO: 4.2 K/UL (ref 4.8–10.8)

## 2021-06-03 PROCEDURE — 700111 HCHG RX REV CODE 636 W/ 250 OVERRIDE (IP): Performed by: STUDENT IN AN ORGANIZED HEALTH CARE EDUCATION/TRAINING PROGRAM

## 2021-06-03 PROCEDURE — A9270 NON-COVERED ITEM OR SERVICE: HCPCS | Performed by: STUDENT IN AN ORGANIZED HEALTH CARE EDUCATION/TRAINING PROGRAM

## 2021-06-03 PROCEDURE — 99239 HOSP IP/OBS DSCHRG MGMT >30: CPT | Mod: GC | Performed by: INTERNAL MEDICINE

## 2021-06-03 PROCEDURE — 36415 COLL VENOUS BLD VENIPUNCTURE: CPT

## 2021-06-03 PROCEDURE — 700102 HCHG RX REV CODE 250 W/ 637 OVERRIDE(OP): Performed by: STUDENT IN AN ORGANIZED HEALTH CARE EDUCATION/TRAINING PROGRAM

## 2021-06-03 PROCEDURE — 84100 ASSAY OF PHOSPHORUS: CPT

## 2021-06-03 PROCEDURE — A9270 NON-COVERED ITEM OR SERVICE: HCPCS | Performed by: INTERNAL MEDICINE

## 2021-06-03 PROCEDURE — 83735 ASSAY OF MAGNESIUM: CPT

## 2021-06-03 PROCEDURE — 700105 HCHG RX REV CODE 258: Performed by: STUDENT IN AN ORGANIZED HEALTH CARE EDUCATION/TRAINING PROGRAM

## 2021-06-03 PROCEDURE — 83605 ASSAY OF LACTIC ACID: CPT | Mod: 91

## 2021-06-03 PROCEDURE — 700111 HCHG RX REV CODE 636 W/ 250 OVERRIDE (IP): Performed by: INTERNAL MEDICINE

## 2021-06-03 PROCEDURE — 80053 COMPREHEN METABOLIC PANEL: CPT

## 2021-06-03 PROCEDURE — 85025 COMPLETE CBC W/AUTO DIFF WBC: CPT

## 2021-06-03 PROCEDURE — 700101 HCHG RX REV CODE 250: Performed by: STUDENT IN AN ORGANIZED HEALTH CARE EDUCATION/TRAINING PROGRAM

## 2021-06-03 PROCEDURE — 700102 HCHG RX REV CODE 250 W/ 637 OVERRIDE(OP): Performed by: INTERNAL MEDICINE

## 2021-06-03 RX ORDER — LANOLIN ALCOHOL/MO/W.PET/CERES
100 CREAM (GRAM) TOPICAL 3 TIMES DAILY
Qty: 90 TABLET | Refills: 0 | Status: SHIPPED | OUTPATIENT
Start: 2021-06-03 | End: 2021-08-05

## 2021-06-03 RX ORDER — CHOLECALCIFEROL (VITAMIN D3) 125 MCG
5 CAPSULE ORAL NIGHTLY
Status: DISCONTINUED | OUTPATIENT
Start: 2021-06-03 | End: 2021-06-03 | Stop reason: HOSPADM

## 2021-06-03 RX ORDER — OMEPRAZOLE 20 MG/1
40 CAPSULE, DELAYED RELEASE ORAL DAILY
Status: DISCONTINUED | OUTPATIENT
Start: 2021-06-04 | End: 2021-06-03 | Stop reason: HOSPADM

## 2021-06-03 RX ORDER — CHOLECALCIFEROL (VITAMIN D3) 125 MCG
5 CAPSULE ORAL
Qty: 30 TABLET | Refills: 0 | Status: CANCELLED | OUTPATIENT
Start: 2021-06-03

## 2021-06-03 RX ORDER — FOLIC ACID 1 MG/1
1 TABLET ORAL DAILY
Qty: 30 TABLET | Refills: 0 | Status: SHIPPED | OUTPATIENT
Start: 2021-06-04 | End: 2021-08-05

## 2021-06-03 RX ORDER — OMEPRAZOLE 40 MG/1
40 CAPSULE, DELAYED RELEASE ORAL DAILY
Qty: 30 CAPSULE | Refills: 0 | Status: SHIPPED | OUTPATIENT
Start: 2021-06-04 | End: 2021-08-05

## 2021-06-03 RX ADMIN — MORPHINE SULFATE 2 MG: 4 INJECTION INTRAVENOUS at 04:06

## 2021-06-03 RX ADMIN — ENOXAPARIN SODIUM 40 MG: 40 INJECTION SUBCUTANEOUS at 06:10

## 2021-06-03 RX ADMIN — Medication 100 MG: at 06:10

## 2021-06-03 RX ADMIN — THERA TABS 1 TABLET: TAB at 06:10

## 2021-06-03 RX ADMIN — DOCUSATE SODIUM 50 MG AND SENNOSIDES 8.6 MG 2 TABLET: 8.6; 5 TABLET, FILM COATED ORAL at 06:11

## 2021-06-03 RX ADMIN — SODIUM PHOSPHATE, MONOBASIC, MONOHYDRATE AND SODIUM PHOSPHATE, DIBASIC, ANHYDROUS 20 MMOL: 276; 142 INJECTION, SOLUTION INTRAVENOUS at 07:45

## 2021-06-03 RX ADMIN — Medication 5 MG: at 03:16

## 2021-06-03 RX ADMIN — FOLIC ACID 1 MG: 1 TABLET ORAL at 06:00

## 2021-06-03 RX ADMIN — Medication 100 MG: at 12:39

## 2021-06-03 RX ADMIN — OMEPRAZOLE 20 MG: 20 CAPSULE, DELAYED RELEASE ORAL at 06:11

## 2021-06-03 ASSESSMENT — LIFESTYLE VARIABLES
AGITATION: NORMAL ACTIVITY
NAUSEA AND VOMITING: NO NAUSEA AND NO VOMITING
TOTAL SCORE: 0
VISUAL DISTURBANCES: NOT PRESENT
PAROXYSMAL SWEATS: NO SWEAT VISIBLE
ANXIETY: NO ANXIETY (AT EASE)
AUDITORY DISTURBANCES: NOT PRESENT
HEADACHE, FULLNESS IN HEAD: NOT PRESENT
TREMOR: NO TREMOR
ORIENTATION AND CLOUDING OF SENSORIUM: ORIENTED AND CAN DO SERIAL ADDITIONS

## 2021-06-03 NOTE — DISCHARGE INSTRUCTIONS
Doctor's Instructions:  1. Stop taking ibuprofen at home. Ibuprofen can worsen the abdominal pain you were experiencing and eventually cause an ulcer to form in your stomach from which you can bleed. To prevent this, we have prescribed omeprazole 20 mg daily. We also recommend a close follow up with your primary care provider within the next two weeks.  2. If you have worsening abdominal pain, increased nausea/vomiting, notice blood in your stool, and/or have very dark, black/tarry stools, immediately report to an Urgent care or ED for further work up.  3. Please follow up with primary care physician for medication reconciliation    Discharge Instructions    Discharged to home by car with relative. Discharged via wheelchair, hospital escort: Yes.  Special equipment needed: Not Applicable    Be sure to schedule a follow-up appointment with your primary care doctor or any specialists as instructed.     Discharge Plan:   Diet Plan: Discussed  Activity Level: Discussed  Confirmed Follow up Appointment: Patient to Call and Schedule Appointment  Confirmed Symptoms Management: Discussed  Medication Reconciliation Updated: Yes    I understand that a diet low in cholesterol, fat, and sodium is recommended for good health. Unless I have been given specific instructions below for another diet, I accept this instruction as my diet prescription.   Other diet: regular    Special Instructions: None    · Is patient discharged on Warfarin / Coumadin?   No     Depression / Suicide Risk    As [facility] are discharged from [facility], it is important to learn how to keep safe from harming yourself.    Recognize the warning signs:  · Abrupt changes in personality, positive or negative- including increase in energy   · Giving away possessions  · Change in eating patterns- significant weight changes-  positive or negative  · Change in sleeping patterns- unable to sleep or sleeping all the time   · Unwillingness or inability to  communicate  · Depression  · Unusual sadness, discouragement and loneliness  · Talk of wanting to die  · Neglect of personal appearance   · Rebelliousness- reckless behavior  · Withdrawal from people/activities they love  · Confusion- inability to concentrate     If you or a loved one observes any of these behaviors or has concerns about self-harm, here's what you can do:  · Talk about it- your feelings and reasons for harming yourself  · Remove any means that you might use to hurt yourself (examples: pills, rope, extension cords, firearm)  · Get professional help from the community (Mental Health, Substance Abuse, psychological counseling)  · Do not be alone:Call your Safe Contact- someone whom you trust who will be there for you.  · Call your local CRISIS HOTLINE 398-2245 or 713-242-7769  · Call your local Children's Mobile Crisis Response Team [place] (095) 364-8467 or www.[employer]  · Call the toll free National Suicide Prevention Hotlines   · National Suicide Prevention Lifeline 817-823-GCVU (8479)  · [employer] Hope Line Network 800-SUICIDE (546-0180)      Abdominal Pain, Adult  Abdominal pain can be caused by many things. Often, abdominal pain is not serious and it gets better with no treatment or by being treated at home. However, sometimes abdominal pain is serious. Your health care provider will do a medical history and a physical exam to try to determine the cause of your abdominal pain.  Follow these instructions at home:  · Take over-the-counter and prescription medicines only as told by your health care provider. Do not take a laxative unless told by your health care provider.  · Drink enough fluid to keep your urine clear or pale yellow.  · Watch your condition for any changes.  · Keep all follow-up visits as told by your health care provider. This is important.  Contact a health care provider if:  · Your abdominal pain changes or gets worse.  · You are not hungry or you lose weight without  trying.  · You are constipated or have diarrhea for more than 2-3 days.  · You have pain when you urinate or have a bowel movement.  · Your abdominal pain wakes you up at night.  · Your pain gets worse with meals, after eating, or with certain foods.  · You are throwing up and cannot keep anything down.  · You have a fever.  Get help right away if:  · Your pain does not go away as soon as your health care provider told you to expect.  · You cannot stop throwing up.  · Your pain is only in areas of the abdomen, such as the right side or the left lower portion of the abdomen.  · You have bloody or black stools, or stools that look like tar.  · You have severe pain, cramping, or bloating in your abdomen.  · You have signs of dehydration, such as:  ? Dark urine, very little urine, or no urine.  ? Cracked lips.  ? Dry mouth.  ? Sunken eyes.  ? Sleepiness.  ? Weakness.  This information is not intended to replace advice given to you by your health care provider. Make sure you discuss any questions you have with your health care provider.  Document Released: 09/27/2006 Document Revised: 07/07/2017 Document Reviewed: 05/31/2017  [employer] Interactive Patient Education © 2020 [employer]

## 2021-06-03 NOTE — PROGRESS NOTES
0646- Received report from noc rn. Patient resting.     0730- Na+ phos started. Assessment complete. No acute needs at this time.     1100- Team at bedside. She is cleared for DC once iv infusion is complete. Around 3PM

## 2021-06-03 NOTE — CARE PLAN
Problem: Fall Risk  Goal: Patient will remain free from falls  Outcome: Progressing     Problem: Pain - Standard  Goal: Alleviation of pain or a reduction in pain to the patient’s comfort goal  Outcome: Progressing   The patient is Watcher - Medium risk of patient condition declining or worsening         Progress made toward(s) clinical / shift goals:  Pt seems a little steadier on feet although still wobbly, patient's pain in abdomen seems to be more under control, pt is sleeping comfortably    Patient is not progressing towards the following goals: N/A

## 2021-06-04 NOTE — DISCHARGE SUMMARY
Discharge Summary    Date of Admission: 6/1/2021  Date of Discharge: 6/3/2021  4:48 PM  Discharging Attending: Bryant Grubbs MD  Discharging Senior Resident: Dr. Wells  Discharging Intern: Dr. Garcia    CHIEF COMPLAINT ON ADMISSION  Chief Complaint   Patient presents with   • Drug Overdose   • Alcohol Intoxication   • Abdominal Pain       Reason for Admission  Alcohol withdrawal, Abdominal pain    Admission Date  6/1/2021    CODE STATUS  Full Code    HPI & HOSPITAL COURSE  Donna Morales is a 40 y.o. female with past medical history of alcohol use disorder, depression, and previous alcohol withdrawal without seizures who presented to the ED on 6/12/2021 with abdominal pain, tremors, nausea, and alcohol withdrawal.  Patient endorses that her last drink was the morning of her admission, and she tried to take 5 tablets of trazodone to help with sleep but denies any suicidal ideations.  In the ED patient was found to be hyponatremic with sodium 151, hypocalcemic with calcium of 6.1, acidotic with bicarb of 11, and hypokalemia with potassium of 3.4, and lactic acidosis with lactic acid of 3.2; she received 2 units of IV fluids in the ED, potassium repletion, calcium repletion.    #Alcohol withdrawal  Patient comes into the ED with alcohol withdrawal complaining of abdominal pain and nausea; initial CIWA score was 16 which decreased to 4, and last CIWA of 0 upon day of discharge.  On the first day of admission patient received approximately 3 mg of Ativan overnight, and did not receive any further Ativan during the stay of her admission.  Patient was counseled on alcohol use and the potential sequela of further use; at this time patient will follow up with her PCP.  Patient to be discharged on folate, thiamine, multivitamin supplementation.    #Hypernatremia  #Hypokalemia  #Hypophosphatemia  #Lactic acidosis  Patient presented to the ED with [place] light abnormalities, for which patient was started on 2  L IV fluid bolus and maintenance IV fluids with resolution of hypernatremia.  For hypokalemia patient was repleted with potassium supplementation with resolution.  For hypophosphatemia patient received 30 mmol sodium phosphate via IV with resolution.  For lactic acidosis, resolved with IV fluids and oral rehydration.    #Epigastric pain  Patient came to the ED with abdominal pain and nausea for which she was treated with IV morphine as needed for the pain and ondansetron for the nausea.  In addition there was concern that patient might have gastritis therefore Tylenol and ibuprofen were held, and patient started on omeprazole 20 mg which was eventually increased to 40 mg daily.  Patient is to follow-up with PCP regarding omeprazole prescription; furthermore patient was counseled on the importance of stopping the ibuprofen as this may worsen her gastric pain or cause gastric ulcer.    Therefore, she is discharged in good and stable condition to home with close outpatient follow-up.    The patient met 2-midnight criteria for an inpatient stay at the time of discharge.    PHYSICAL EXAM ON DISCHARGE  Temp:  [36.2 °C (97.1 °F)-36.7 °C (98.1 °F)] 36.6 °C (97.9 °F)  Pulse:  [84-93] 85  Resp:  [15-16] 16  BP: (129-135)/(80-91) 129/84  SpO2:  [92 %-93 %] 93 %    Physical Exam  Constitutional:       Appearance: Normal appearance.   HENT:      Head: Normocephalic and atraumatic.      Mouth/Throat:      Mouth: Mucous membranes are moist.      Pharynx: Oropharynx is clear.   Eyes:      Extraocular Movements: Extraocular movements intact.      Conjunctiva/sclera: Conjunctivae normal.      Pupils: Pupils are equal, round, and reactive to light.   Cardiovascular:      Rate and Rhythm: Normal rate and regular rhythm.      Pulses: Normal pulses.      Heart sounds: Normal heart sounds. No murmur heard.     Pulmonary:      Effort: Pulmonary effort is normal.      Breath sounds: Normal breath sounds. No wheezing.   Abdominal:       General: Abdomen is flat. Bowel sounds are normal.      Palpations: Abdomen is soft.      Tenderness: There is abdominal tenderness.      Comments: Mild right upper quadrant and epigastric pain, improved since admission, no radiation.   Musculoskeletal:         General: Normal range of motion.      Right lower leg: No edema.      Left lower leg: No edema.   Skin:     General: Skin is warm and dry.      Capillary Refill: Capillary refill takes less than 2 seconds.   Neurological:      General: No focal deficit present.      Mental Status: She is alert and oriented to person, place, and time. Mental status is at baseline.      Motor: No weakness.   Psychiatric:         Mood and Affect: Mood normal.         Behavior: Behavior normal.         Thought Content: Thought content normal.         Judgment: Judgment normal.         Discharge Date  6/3/2021    FOLLOW UP ITEMS POST DISCHARGE  Please follow-up with primary care physician for follow-up on chronic health conditions and medication reconciliation.    DISCHARGE DIAGNOSES  Principal Problem:    Alcohol use disorder, severe, dependence (HCC) POA: Yes  Active Problems:    Hypernatremia POA: Unknown    High anion gap metabolic acidosis POA: Yes    Epigastric pain POA: Unknown    Hypomagnesemia POA: Unknown    Hypophosphatemia POA: Unknown    Anxiety disorder, unspecified POA: Yes  Resolved Problems:    * No resolved hospital problems. *      FOLLOW UP  No future appointments.  David Coughlin M.D.  [address] [place] [address] [id number]  [address]  [facility] [address]  [phone]    Call  Please call your primary care provider to schedule a hospital follow up. Thank you.      MEDICATIONS ON DISCHARGE     Medication List      START taking these medications      Instructions   folic acid 1 MG Tabs  Start taking on: June 4, 2021  Commonly known as: FOLVITE   Take 1 tablet by mouth every day.  Dose: 1 mg     multivitamin Tabs  Start taking on: June 4, 2021   Take 1 tablet by mouth every day.  Dose:  1 tablet     omeprazole 40 MG delayed-release capsule  Start taking on: June 4, 2021  Commonly known as: PRILOSEC   Take 1 capsule by mouth every day.  Dose: 40 mg     thiamine 100 MG tablet  Commonly known as: THIAMINE   Take 1 tablet by mouth 3 times a day.  Dose: 100 mg        CONTINUE taking these medications      Instructions   FLUoxetine 20 MG Caps  Commonly known as: PROZAC   Take 1 Cap by mouth every day.  Dose: 20 mg     gabapentin 300 MG Caps  Commonly known as: NEURONTIN   Take 1 Cap by mouth 3 times a day.  Dose: 300 mg     hydrOXYzine pamoate 50 MG Caps  Commonly known as: VISTARIL   Take 1 Cap by mouth 2 times a day as needed for Anxiety.  Dose: 50 mg     naltrexone 50 MG Tabs  Commonly known as: DEPADE   Take 1 Tab by mouth every day.  Dose: 50 mg     Non Formulary Request   Take 1 tablet by mouth every 48 hours. Anxiety stress vitamin  Dose: 1 tablet     traZODone 100 MG Tabs  Commonly known as: DESYREL   Take 1 Tab by mouth at bedtime as needed for Sleep.  Dose: 100 mg        STOP taking these medications    Advil -25 MG Caps  Generic drug: Ibuprofen-diphenhydrAMINE HCl            Allergies  No Known Allergies    DIET  Orders Placed This Encounter   Procedures   • Diet Order Diet: Regular     Standing Status:   Standing     Number of Occurrences:   1     Order Specific Question:   Diet:     Answer:   Regular [1]       ACTIVITY  As tolerated.  Weight bearing as tolerated    CONSULTATIONS  None    PROCEDURES  None

## 2021-06-15 ENCOUNTER — HOSPITAL ENCOUNTER (EMERGENCY)
Facility: MEDICAL CENTER | Age: 41
End: 2021-06-16
Attending: EMERGENCY MEDICINE
Payer: COMMERCIAL

## 2021-06-15 DIAGNOSIS — F10.10 ALCOHOL ABUSE: ICD-10-CM

## 2021-06-15 LAB
ALBUMIN SERPL BCP-MCNC: 4.4 G/DL (ref 3.2–4.9)
ALBUMIN/GLOB SERPL: 1.3 G/DL
ALP SERPL-CCNC: 151 U/L (ref 30–99)
ALT SERPL-CCNC: 44 U/L (ref 2–50)
AMMONIA PLAS-SCNC: 20 UMOL/L (ref 11–45)
ANION GAP SERPL CALC-SCNC: 21 MMOL/L (ref 7–16)
ANISOCYTOSIS BLD QL SMEAR: ABNORMAL
AST SERPL-CCNC: 146 U/L (ref 12–45)
BASOPHILS # BLD AUTO: 3.5 % (ref 0–1.8)
BASOPHILS # BLD: 0.14 K/UL (ref 0–0.12)
BILIRUB SERPL-MCNC: 0.2 MG/DL (ref 0.1–1.5)
BUN SERPL-MCNC: 15 MG/DL (ref 8–22)
CALCIUM SERPL-MCNC: 8.4 MG/DL (ref 8.5–10.5)
CHLORIDE SERPL-SCNC: 107 MMOL/L (ref 96–112)
CO2 SERPL-SCNC: 17 MMOL/L (ref 20–33)
CREAT SERPL-MCNC: 1.21 MG/DL (ref 0.5–1.4)
EOSINOPHIL # BLD AUTO: 0 K/UL (ref 0–0.51)
EOSINOPHIL NFR BLD: 0 % (ref 0–6.9)
ERYTHROCYTE [DISTWIDTH] IN BLOOD BY AUTOMATED COUNT: 61.6 FL (ref 35.9–50)
ETHANOL BLD-MCNC: 436.8 MG/DL (ref 0–10)
GLOBULIN SER CALC-MCNC: 3.5 G/DL (ref 1.9–3.5)
GLUCOSE SERPL-MCNC: 86 MG/DL (ref 65–99)
HCT VFR BLD AUTO: 37.3 % (ref 37–47)
HGB BLD-MCNC: 11.4 G/DL (ref 12–16)
LIPASE SERPL-CCNC: 7 U/L (ref 11–82)
LYMPHOCYTES # BLD AUTO: 2.03 K/UL (ref 1–4.8)
LYMPHOCYTES NFR BLD: 49.5 % (ref 22–41)
MACROCYTES BLD QL SMEAR: ABNORMAL
MANUAL DIFF BLD: NORMAL
MCH RBC QN AUTO: 27.6 PG (ref 27–33)
MCHC RBC AUTO-ENTMCNC: 30.6 G/DL (ref 33.6–35)
MCV RBC AUTO: 90.3 FL (ref 81.4–97.8)
METAMYELOCYTES NFR BLD MANUAL: 0.9 %
MONOCYTES # BLD AUTO: 0.11 K/UL (ref 0–0.85)
MONOCYTES NFR BLD AUTO: 2.6 % (ref 0–13.4)
MORPHOLOGY BLD-IMP: NORMAL
NEUTROPHILS # BLD AUTO: 1.78 K/UL (ref 2–7.15)
NEUTROPHILS NFR BLD: 43.5 % (ref 44–72)
NRBC # BLD AUTO: 0 K/UL
NRBC BLD-RTO: 0 /100 WBC
PLATELET # BLD AUTO: 205 K/UL (ref 164–446)
PLATELET BLD QL SMEAR: NORMAL
PMV BLD AUTO: 9.4 FL (ref 9–12.9)
POTASSIUM SERPL-SCNC: 4.7 MMOL/L (ref 3.6–5.5)
PROT SERPL-MCNC: 7.9 G/DL (ref 6–8.2)
RBC # BLD AUTO: 4.13 M/UL (ref 4.2–5.4)
RBC BLD AUTO: PRESENT
SMUDGE CELLS BLD QL SMEAR: NORMAL
SODIUM SERPL-SCNC: 145 MMOL/L (ref 135–145)
WBC # BLD AUTO: 4.1 K/UL (ref 4.8–10.8)

## 2021-06-15 PROCEDURE — A9270 NON-COVERED ITEM OR SERVICE: HCPCS | Performed by: EMERGENCY MEDICINE

## 2021-06-15 PROCEDURE — 700111 HCHG RX REV CODE 636 W/ 250 OVERRIDE (IP): Performed by: EMERGENCY MEDICINE

## 2021-06-15 PROCEDURE — 700105 HCHG RX REV CODE 258: Performed by: EMERGENCY MEDICINE

## 2021-06-15 PROCEDURE — 82140 ASSAY OF AMMONIA: CPT

## 2021-06-15 PROCEDURE — 80053 COMPREHEN METABOLIC PANEL: CPT

## 2021-06-15 PROCEDURE — 99285 EMERGENCY DEPT VISIT HI MDM: CPT

## 2021-06-15 PROCEDURE — 83690 ASSAY OF LIPASE: CPT

## 2021-06-15 PROCEDURE — 700102 HCHG RX REV CODE 250 W/ 637 OVERRIDE(OP): Performed by: EMERGENCY MEDICINE

## 2021-06-15 PROCEDURE — 82077 ASSAY SPEC XCP UR&BREATH IA: CPT

## 2021-06-15 PROCEDURE — 85027 COMPLETE CBC AUTOMATED: CPT

## 2021-06-15 PROCEDURE — 96374 THER/PROPH/DIAG INJ IV PUSH: CPT

## 2021-06-15 PROCEDURE — 85007 BL SMEAR W/DIFF WBC COUNT: CPT

## 2021-06-15 RX ORDER — SODIUM CHLORIDE 9 MG/ML
1000 INJECTION, SOLUTION INTRAVENOUS ONCE
Status: COMPLETED | OUTPATIENT
Start: 2021-06-15 | End: 2021-06-15

## 2021-06-15 RX ORDER — TRAZODONE HYDROCHLORIDE 50 MG/1
100 TABLET ORAL
Status: DISCONTINUED | OUTPATIENT
Start: 2021-06-16 | End: 2021-06-16 | Stop reason: HOSPADM

## 2021-06-15 RX ADMIN — SODIUM CHLORIDE 1000 ML: 9 INJECTION, SOLUTION INTRAVENOUS at 21:41

## 2021-06-15 RX ADMIN — FENTANYL CITRATE 50 MCG: 50 INJECTION, SOLUTION INTRAMUSCULAR; INTRAVENOUS at 23:55

## 2021-06-15 RX ADMIN — TRAZODONE HYDROCHLORIDE 100 MG: 50 TABLET ORAL at 23:54

## 2021-06-15 ASSESSMENT — FIBROSIS 4 INDEX: FIB4 SCORE: 1.98

## 2021-06-16 VITALS
TEMPERATURE: 97.2 F | OXYGEN SATURATION: 97 % | DIASTOLIC BLOOD PRESSURE: 55 MMHG | HEART RATE: 114 BPM | BODY MASS INDEX: 29.95 KG/M2 | SYSTOLIC BLOOD PRESSURE: 101 MMHG | WEIGHT: 169 LBS | HEIGHT: 63 IN | RESPIRATION RATE: 16 BRPM

## 2021-06-16 LAB
POC BREATHALIZER: 0.07 PERCENT (ref 0–0.01)
POC BREATHALIZER: 0.09 PERCENT (ref 0–0.01)
POC BREATHALIZER: 0.11 PERCENT (ref 0–0.01)
POC BREATHALIZER: 0.14 PERCENT (ref 0–0.01)
POC BREATHALIZER: 0.19 PERCENT (ref 0–0.01)

## 2021-06-16 PROCEDURE — 700105 HCHG RX REV CODE 258: Performed by: EMERGENCY MEDICINE

## 2021-06-16 PROCEDURE — 700102 HCHG RX REV CODE 250 W/ 637 OVERRIDE(OP): Performed by: EMERGENCY MEDICINE

## 2021-06-16 PROCEDURE — A9270 NON-COVERED ITEM OR SERVICE: HCPCS | Performed by: EMERGENCY MEDICINE

## 2021-06-16 PROCEDURE — 302970 POC BREATHALIZER: Performed by: EMERGENCY MEDICINE

## 2021-06-16 PROCEDURE — 90791 PSYCH DIAGNOSTIC EVALUATION: CPT

## 2021-06-16 RX ORDER — CHLORDIAZEPOXIDE HYDROCHLORIDE 25 MG/1
50 CAPSULE, GELATIN COATED ORAL ONCE
Status: COMPLETED | OUTPATIENT
Start: 2021-06-16 | End: 2021-06-16

## 2021-06-16 RX ORDER — ACETAMINOPHEN 325 MG/1
975 TABLET ORAL ONCE
Status: COMPLETED | OUTPATIENT
Start: 2021-06-16 | End: 2021-06-16

## 2021-06-16 RX ORDER — LORAZEPAM 1 MG/1
1 TABLET ORAL ONCE
Status: COMPLETED | OUTPATIENT
Start: 2021-06-16 | End: 2021-06-16

## 2021-06-16 RX ORDER — SODIUM CHLORIDE, SODIUM LACTATE, POTASSIUM CHLORIDE, CALCIUM CHLORIDE 600; 310; 30; 20 MG/100ML; MG/100ML; MG/100ML; MG/100ML
1000 INJECTION, SOLUTION INTRAVENOUS ONCE
Status: COMPLETED | OUTPATIENT
Start: 2021-06-16 | End: 2021-06-16

## 2021-06-16 RX ADMIN — ACETAMINOPHEN 975 MG: 325 TABLET, FILM COATED ORAL at 14:13

## 2021-06-16 RX ADMIN — SODIUM CHLORIDE, POTASSIUM CHLORIDE, SODIUM LACTATE AND CALCIUM CHLORIDE 1000 ML: 600; 310; 30; 20 INJECTION, SOLUTION INTRAVENOUS at 13:11

## 2021-06-16 RX ADMIN — CHLORDIAZEPOXIDE HYDROCHLORIDE 50 MG: 25 CAPSULE ORAL at 09:46

## 2021-06-16 RX ADMIN — LIDOCAINE HYDROCHLORIDE 30 ML: 20 SOLUTION OROPHARYNGEAL at 15:23

## 2021-06-16 RX ADMIN — LORAZEPAM 1 MG: 1 TABLET ORAL at 09:46

## 2021-06-16 NOTE — ED NOTES
Irene nichols at bedside in direct view of pt. Report given   [No studies available for review at this time.] : No studies available for review at this time.

## 2021-06-16 NOTE — ED PROVIDER NOTES
"ED Provider Note    CHIEF COMPLAINT  Chief Complaint   Patient presents with   • ETOH Intoxication     pt reports drinking about 1 pint liquor today   • Legal 2000     placed on L2K by PD for inability to care for self.        HPI  Donna Morales is a 40 y.o. female who presents in an altered state.  According to EMS they were called to the patient's residence as she has not been eating and been drinking heavily on a daily basis.  She was altered and family feels she cannot care for herself.  They like her placed back at [facility].  She been noncompliant with her psychiatric medication.  On arrival she admits to drinking daily but does not have any current medical plaints.    REVIEW OF SYSTEMS  See HPI for further details. All other systems are negative.     PAST MEDICAL HISTORY  Past Medical History:   Diagnosis Date   • Kidney failure 2008    dialysis, resolved \"too much tylenol\"   • Dialysis 2008   • Alcoholism (HCC)    • Anemia    • Anesthesia     \"woke up before I should have\"   • Anxiety    • Heart burn    • Hypertension    • Liver failure (HCC)    • Other specified symptom associated with female genital organs     endometriosis   • Pancreatitis    • Wrist pain        FAMILY HISTORY  [unfilled]    SOCIAL HISTORY  Social History     Socioeconomic History   • Marital status: Single     Spouse name: Not on file   • Number of children: Not on file   • Years of education: Not on file   • Highest education level: Not on file   Occupational History   • Not on file   Tobacco Use   • Smoking status: Never Smoker   • Smokeless tobacco: Never Used   Vaping Use   • Vaping Use: Never used   Substance and Sexual Activity   • Alcohol use: Yes   • Drug use: Not Currently   • Sexual activity: Yes     Partners: Male     Birth control/protection: Patch   Other Topics Concern   • Not on file   Social History Narrative   • Not on file     Social Determinants of Health     Financial Resource Strain:    • " "Difficulty of Paying Living Expenses:    Food Insecurity:    • Worried About Running Out of Food in the Last Year:    • Ran Out of Food in the Last Year:    Transportation Needs:    • Lack of Transportation (Medical):    • Lack of Transportation (Non-Medical):    Physical Activity:    • Days of Exercise per Week:    • Minutes of Exercise per Session:    Stress:    • Feeling of Stress :    Social Connections:    • Frequency of Communication with Friends and Family:    • Frequency of Social Gatherings with Friends and Family:    • Attends Zoroastrianism Services:    • Active Member of Clubs or Organizations:    • Attends Club or Organization Meetings:    • Marital Status:    Intimate Partner Violence:    • Fear of Current or Ex-Partner:    • Emotionally Abused:    • Physically Abused:    • Sexually Abused:        SURGICAL HISTORY  Past Surgical History:   Procedure Laterality Date   • FLEXOR TENDON REPAIR  7/18/2012    Performed by MAI BRITO at SURGERY [facility]   • AMANDA BY LAPAROSCOPY  2006   • LAPAROSCOPY  2000    endometriosis       CURRENT MEDICATIONS  Home Medications    **Home medications have not yet been reviewed for this encounter**         ALLERGIES  No Known Allergies    PHYSICAL EXAM  VITAL SIGNS: /89   Pulse 93   Ht 1.6 m (5' 3\")   Wt 76.7 kg (169 lb)   SpO2 94%   BMI 29.94 kg/m²       Constitutional: Patient appears ill  HENT: Normocephalic, Atraumatic, Bilateral external ears normal, Oropharynx moist, No oral exudates, Nose normal.   Eyes: PERRLA, EOMI, Conjunctiva normal, No discharge.   Neck: Normal range of motion, No tenderness, Supple, No stridor.   Lymphatic: No lymphadenopathy noted.   Cardiovascular: Normal heart rate, Normal rhythm, No murmurs, No rubs, No gallops.   Thorax & Lungs: Normal breath sounds, No respiratory distress, No wheezing, No chest tenderness.   Abdomen: Diffuse tenderness, hypoactive bowel sounds, no rebound, no guarding.   Skin: Warm, Dry, No " erythema, No rash.   Back: No tenderness, No CVA tenderness.   Extremities: Intact distal pulses, No edema, No tenderness, No cyanosis, No clubbing.    Neurologic: Grossly intact  Psychiatric: Depressed affect but no suicidal ideation  Results for orders placed or performed during the hospital encounter of 06/15/21   Blood Alcohol   Result Value Ref Range    Diagnostic Alcohol 436.8 (H) 0.0 - 10.0 mg/dL   CBC WITH DIFFERENTIAL   Result Value Ref Range    WBC 4.1 (L) 4.8 - 10.8 K/uL    RBC 4.13 (L) 4.20 - 5.40 M/uL    Hemoglobin 11.4 (L) 12.0 - 16.0 g/dL    Hematocrit 37.3 37.0 - 47.0 %    MCV 90.3 81.4 - 97.8 fL    MCH 27.6 27.0 - 33.0 pg    MCHC 30.6 (L) 33.6 - 35.0 g/dL    RDW 61.6 (H) 35.9 - 50.0 fL    Platelet Count 205 164 - 446 K/uL    MPV 9.4 9.0 - 12.9 fL    Neutrophils-Polys 43.50 (L) 44.00 - 72.00 %    Lymphocytes 49.50 (H) 22.00 - 41.00 %    Monocytes 2.60 0.00 - 13.40 %    Eosinophils 0.00 0.00 - 6.90 %    Basophils 3.50 (H) 0.00 - 1.80 %    Nucleated RBC 0.00 /100 WBC    Neutrophils (Absolute) 1.78 (L) 2.00 - 7.15 K/uL    Lymphs (Absolute) 2.03 1.00 - 4.80 K/uL    Monos (Absolute) 0.11 0.00 - 0.85 K/uL    Eos (Absolute) 0.00 0.00 - 0.51 K/uL    Baso (Absolute) 0.14 (H) 0.00 - 0.12 K/uL    NRBC (Absolute) 0.00 K/uL    Anisocytosis 1+     Macrocytosis 1+    COMP METABOLIC PANEL   Result Value Ref Range    Sodium 145 135 - 145 mmol/L    Potassium 4.7 3.6 - 5.5 mmol/L    Chloride 107 96 - 112 mmol/L    Co2 17 (L) 20 - 33 mmol/L    Anion Gap 21.0 (H) 7.0 - 16.0    Glucose 86 65 - 99 mg/dL    Bun 15 8 - 22 mg/dL    Creatinine 1.21 0.50 - 1.40 mg/dL    Calcium 8.4 (L) 8.5 - 10.5 mg/dL    AST(SGOT) 146 (H) 12 - 45 U/L    ALT(SGPT) 44 2 - 50 U/L    Alkaline Phosphatase 151 (H) 30 - 99 U/L    Total Bilirubin 0.2 0.1 - 1.5 mg/dL    Albumin 4.4 3.2 - 4.9 g/dL    Total Protein 7.9 6.0 - 8.2 g/dL    Globulin 3.5 1.9 - 3.5 g/dL    A-G Ratio 1.3 g/dL   LIPASE   Result Value Ref Range    Lipase 7 (L) 11 - 82 U/L    AMMONIA   Result Value Ref Range    Ammonia 20 11 - 45 umol/L   ESTIMATED GFR   Result Value Ref Range    GFR If  59 (A) >60 mL/min/1.73 m 2    GFR If Non African American 49 (A) >60 mL/min/1.73 m 2   DIFFERENTIAL MANUAL   Result Value Ref Range    Metamyelocytes 0.90 %    Manual Diff Status PERFORMED    PERIPHERAL SMEAR REVIEW   Result Value Ref Range    Peripheral Smear Review see below    PLATELET ESTIMATE   Result Value Ref Range    Plt Estimation Normal    MORPHOLOGY   Result Value Ref Range    RBC Morphology Present     Smudge Cells Few        COURSE & MEDICAL DECISION MAKING  Pertinent Labs & Imaging studies reviewed. (See chart for details)  This a 40-year-old female who presents in an altered state.  On arrival see any signs of trauma nor infection.  I did check an ammonia level due to her alcohol induced cirrhosis and this was normal.  I suspect her altered state is due to her alcohol intoxication.  In speaking with life skills she is well-known and has been noncompliant with her psychiatric medication.  She cannot care for self.  She will require a legal hold.  When she is legally sober 0.08 will certify the hold and transfer the patient to [facility].  At this time she is medically cleared.    FINAL IMPRESSION  1.  Altered mental status  2.  Alcohol abuse and intoxication  3.  Psychosis with inability to care for self  4.  Medical clearance for psychiatric care      Electronically signed by: Stephon Barrow M.D., 6/15/2021 8:49 PM

## 2021-06-16 NOTE — PROGRESS NOTES
Spiritual Care Note    Patient Information     Patient's Name: Donna Morales   MRN: [id number]    YOB: 1980   Age and Gender: 40 y.o. female   Service Area: ED C   Room (and Bed):  30/30 [facility]   Ethnicity or Nationality:     Primary Language: English   Yazdanism/Spiritual preference: Anabaptist   Place of Residence: [place]   Family/Friends/Others Present: No   Clinical Team Present: No   Medical Diagnosis(-es)/Procedure(s): ETOH Intoxication   Code Status: Prior    Date of Admission: 6/15/2021   Length of Stay: 0 days        Spiritual Care Provider Information:  Name of Spiritual Care Provider: Theresa Jeffrey  Title of Spiritual Care Provider: Associate   Phone Number: [phone]  E-mail: [email or web address]  Total time : 5 minutes    Encounter/Request Information  Encounter/Request Type   Visited With: Patient, Refused care  General Visit: Yes  Referral From/ Origin of Request: [place] management rounds, Verbal patient    Religous Needs/Values       Spiritual Assessment     Spiritual Care Encounters    Interaction/Conversation: Hortencia Reed referred the  to this pt, who politely declined the visit.    Plan: Visit Upon Request    Notes:

## 2021-06-16 NOTE — CONSULTS
"[facility]   TRIAGE ASSESSMENT    Name: Donna Morales  MRN: [id number]  : 1980  Age: 40 y.o.  Date of assessment: 2021  PCP: David Coughlin M.D.  Persons in attendance: Patient  Patient Location: [facility]    CHIEF COMPLAINT/PRESENTING ISSUE (as stated by patient): 40 year old female BIB EMS last night, legal hold,\" family feels she cannot care for herself' with ETOH intoxication; on admit 6/15 PM, ETOH level 0.436; pt with recent [facility] medical admit 21-6/3/21 for Alcohol use d/o with DC to self; later today, ETOH level 0.71 at 1440; pt drowsy, oriented to person, place, events, disoriented to date; anxious;  cooperative; with organized thoughts and behaviors; no delusions, paranoia, hallucinations noted; insight, judgment adequate; currently denies SI, HI, or self-harm ideation; future-oriented; states she is in the ED b/c she \"had too much to drink yesterday, my mother called the ambulance, she didn't want me to have a seizure\"; states current substance use includes ETOH 325 ml daily x 2 weeks with last use 6/15/21; denies h/o SA or self-harm; denies current outpt  provider; with h/o outpt psychiatry, Dr. 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   Yes:    Dates Level of Care Facilty/Provider Diagnosis/Problem Medications   4/2021-5/2021 outpt CD [facility] IOP Alcohol use d/o     3/2021 inpt CD [facility] Alcohol use d/o     12/2020 outpt  psychiatry, Dr. Dunbar, and therapist Enedina Hairston at  [facility] Alcohol use d/o   Prozac 20 mg PO daily, last taken 6/13/21 and Trazodone 100 mg PO QHS, taking as prescribed; pt is not taking previously prescribed Naltrexone 50 mg PO daily, Vistaril 50 mg PO BID       SAFETY ASSESSMENT - SELF  Does patient acknowledge current or past symptoms of dangerousness to self or is previous history noted? no  Does parent/significant other report patient has current or past symptoms of dangerousness to self? N\A  Does presenting problem suggest symptoms of dangerousness to self? No    SAFETY ASSESSMENT - OTHERS  Does patient acknowledge current or past symptoms of aggressive behavior or risk to others or is previous history noted? no  Does parent/significant other report patient has current or past symptoms of aggressive behavior or risk to others?  N\A  Does presenting problem suggest symptoms of dangerousness to others? No    LEGAL HISTORY  Does patient acknowledge history of arrest/custodial/longterm or is previous history noted? no    Crisis Safety Plan completed and copy given to patient? N\A    ABUSE/NEGLECT SCREENING  Does patient report feeling “unsafe” in his/her home, or afraid of anyone?  no  Does patient report any history of physical, sexual, or emotional abuse?  no  Does parent or significant other report any of the above? N\A  Is there evidence of neglect by self?  no  Is there evidence of neglect by a caregiver? no  Does the patient/parent report any history of CPS/APS/police involvement related to suspected  "abuse/neglect or domestic violence? no  Based on the information provided during the current assessment, is a mandated report of suspected abuse/neglect being made?  No    SUBSTANCE USE SCREENING  Yes:  Ruslan all substances used in the past 30 days:      Last Use Amount   [x]   Alcohol 6/15/21 325 ml daily   []   Marijuana     []   Heroin     []   Prescription Opioids  (used without prescription, for    recreation, or in excess of prescribed amount)     []   Other Prescription  (used without prescription, for    recreation, or in excess of prescribed amount)     []   Cocaine      []   Methamphetamine     []   \"\" drugs (ectasy, MDMA)     []   Other substances        UDS results: pending collection  Breathalyzer results: 0.436    What consequences does the patient associate with any of the above substance use and or addictive behaviors? Other: \"everything\"    Risk factors for detox (check all that apply):  [x]  Seizures   [x]  Diaphoretic (sweating)   [x]  Tremors   [x]  Hallucinations   [x]  Increased blood pressure   []  Decreased blood pressure   []  Other   []  None      [] Patient education on risk factors for detoxification and instructed to return to ER as needed.      MENTAL STATUS   Participation: Limited verbal participation, Open to feedback and Guarded  Grooming: Casual and Neat  Orientation: Drowsy/Somnolent and Disoriented to: date  Behavior: anxious  Eye contact: Limited  Mood: Anxious  Affect: Flat and Anxious  Thought process: Logical, Goal-directed and Circumstantial  Thought content: Within normal limits  Speech: Rate within normal limits and Volume within normal limits  Perception: Within normal limits  Memory:  No gross evidence of memory deficits  Insight: Adequate  Judgment:  Adequate  Other:    Collateral information:   Source:  [] Significant other present in person:   [] Significant other by telephone  [] [facility]   [x] [facility]  [x] [facility]  [] " Other:     [] Unable to complete full assessment due to:  [] Acute intoxication  [] Patient declined to participate/engage  [] Patient verbally unresponsive  [] Significant cognitive deficits  [] Significant perceptual distortions or behavioral disorganization  [] Other:      CLINICAL IMPRESSIONS:  Primary:  Alcohol use d/o by history  Secondary:         IDENTIFIED NEEDS/PLAN:  [Trigger DISPOSITION list for any items marked]    []  Imminent safety risk - self [] Imminent safety risk - others   [x]  Acute substance withdrawal []  Psychosis/Impaired reality testing   []  Mood/anxiety []  Substance use/Addictive behavior   []  Maladaptive behaviro []  Parent/child conflict   []  Family/Couples conflict []  Biomedical   []  Housing []  Financial   []   Legal  Occupational/Educational   []  Domestic violence []  Other:     Recommended Plan of Care:  Refer to [facility], [facility] and [facility]; [employer] insurance plan; Writer RN reviewed community CD and MH resources with pt, with written information given, writer RN encouraged pt to f/u at [facility] or [facility] for voluntary detox admit; writer RN to send pt referral to [facility]; pt verbalized understanding; pt DC'd to self awaiting transport by mother    Has the Recommended Plan of Care/Level of Observation been reviewed with the patient's assigned nurse? yes    Does patient/parent or guardian express agreement with the above plan? yes    Referral appointment(s) scheduled? no    Alert team only:   I have discussed findings and recommendations with Dr. Lozano who is in agreement with these recommendations. Legal hold discontinued    Referral information sent to the following community providers :  [facility]    If applicable : Referred  to : MADISON Rodriguez R.N.  6/16/2021

## 2021-06-16 NOTE — ED NOTES
Pt moved to room 30, sleeping on gurney, assumed care of pt. Sitter outside of room in constant view of pt.

## 2021-06-16 NOTE — ED PROVIDER NOTES
ED Provider Note    The patient was seen when she was clinically sober by both myself as well as life skills.  Hortencia with life skills knows the patient well.  She does not meet criteria for legal hold as she has no acute suicidality acute psychosis.  Most of her issues are related to chronic alcohol abuse and dependence.  She was complaining of some mild abdominal discomfort.  She had no evidence of peritonitis laboratory studies are otherwise unremarkable.  Her gallbladder is already been removed.  I suspect that she has some mild alcohol gastritis without evidence of hemorrhage.  Should be discharged with referral to [facility]

## 2021-06-16 NOTE — ED NOTES
Pt given discharge instructions. Pt verbalized understanding of alcohol intoxication and how to access community resources.

## 2021-06-16 NOTE — ED NOTES
Patient was walked to the restroom via sitter cna. She was unable to provide urine sample at this time. Gave patient water and cold box meal tray.

## 2021-06-16 NOTE — DISCHARGE PLANNING
"Alert Team:    Patient placed on legal hold by Mobile Outreach Safety Team; received phone call from Karen Canela,  MFT who evaluated patient at her grandmother's home; informed that Family reported to Karen that patient has significantly declined, stopped eating Friday, not getting out of bed and preforming ADLs. 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 Family reported patient has stopped all medications; EMR shows patient has not had f/u with Dr. Dunbar at [facility] since 11/9/2020. Inpatient at [facility] 4 months ago. Family requesting patient return to [facility] once medically stable. Patient vocalizing hopelessness, \"what's the point.\" Latonya (mom) cell [phone], house [phone] for collateral information if needed. Will initiate evaluation once clinically sober; Diagnostic Alcohol 436.8 at 2134. Alert Team will continue to monitor.   "

## 2021-06-16 NOTE — ED PROVIDER NOTES
ED Provider Note    0400 -patient signed out to me by my colleague for reevaluation final disposition upon sobriety.  Please refer to initial note for complete details.  Patient does have history of psychiatric disorder, acute psychosis which is reportedly exacerbated with alcohol dependence and medication noncompliance.  It was advised that this patient be transferred for further psychiatric care.    9:28 AM reviewed breathalyzer 0.14.  She is not legally sober, although clinically interactive and cooperative at this time.  She does have headache, mild tremor, tachycardia.  Early alcohol withdrawal, although not severe and without evidence for DTs.  She will be given oral Ativan and Librium.  Alert team is aware and will assess upon sobriety.

## 2021-06-16 NOTE — ED NOTES
"Patient's home medications have been reviewed by the pharmacy team. Pt denies taking any RX's or OTC's.    Past Medical History:   Diagnosis Date   • Alcoholism (HCC)    • Anemia    • Anesthesia     \"woke up before I should have\"   • Anxiety    • Dialysis 2008   • Heart burn    • Hypertension    • Kidney failure 2008    dialysis, resolved \"too much tylenol\"   • Liver failure (HCC)    • Other specified symptom associated with female genital organs     endometriosis   • Pancreatitis    • Wrist pain        Patient's Medications   New Prescriptions    No medications on file   Previous Medications    FLUOXETINE (PROZAC) 20 MG CAP    Take 1 Cap by mouth every day.    FOLIC ACID (FOLVITE) 1 MG TAB    Take 1 tablet by mouth every day.    GABAPENTIN (NEURONTIN) 300 MG CAP    Take 1 Cap by mouth 3 times a day.    HYDROXYZINE PAMOATE (VISTARIL) 50 MG CAP    Take 1 Cap by mouth 2 times a day as needed for Anxiety.    MULTIVITAMIN (THERAGRAN) TAB    Take 1 tablet by mouth every day.    NALTREXONE (DEPADE) 50 MG TAB    Take 1 Tab by mouth every day.    OMEPRAZOLE (PRILOSEC) 40 MG DELAYED-RELEASE CAPSULE    Take 1 capsule by mouth every day.    THIAMINE (THIAMINE) 100 MG TABLET    Take 1 tablet by mouth 3 times a day.    TRAZODONE (DESYREL) 100 MG TAB    Take 1 Tab by mouth at bedtime as needed for Sleep.   Modified Medications    No medications on file   Discontinued Medications    NON FORMULARY REQUEST    Take 1 tablet by mouth every 48 hours. Anxiety stress vitamin          A:  Medications do not appear to be contributing to current complaints.         P:    No recommendations at this time. Will follow Psychiatry's recommendations.     Arelsi, HuD, BCPS          "

## 2021-06-16 NOTE — ED TRIAGE NOTES
"Chief Complaint   Patient presents with   • ETOH Intoxication     pt reports drinking about 1 pint liquor today   • Legal 2000     placed on L2K by PD for inability to care for self.      Pt BIB [facility] for above complaint. Mom called PD stating pt has not been eating, taking her medications, and drinking heavily. Denies SI/HI. A&Ox3, slurring words. Pt stating her head hurts and she would like morphine.   PFJT=029, POC breathalyzer 0.404      Medications given en route: none       /89   Pulse 93   Temp 36.7 °C (98 °F) (Temporal)   Ht 1.6 m (5' 3\")   Wt 76.7 kg (169 lb)   SpO2 94%   BMI 29.94 kg/m²         "

## 2021-06-25 ENCOUNTER — HOSPITAL ENCOUNTER (EMERGENCY)
Dept: HOSPITAL 8 - ED | Age: 41
LOS: 1 days | End: 2021-06-26
Payer: SELF-PAY

## 2021-06-25 VITALS — WEIGHT: 175.38 LBS | HEIGHT: 67 IN | BODY MASS INDEX: 27.53 KG/M2

## 2021-06-25 DIAGNOSIS — F10.220: Primary | ICD-10-CM

## 2021-06-25 DIAGNOSIS — Z20.822: ICD-10-CM
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
## 2021-06-25 LAB
<PLATELET ESTIMATE>: ADEQUATE
<PLT MORPHOLOGY>: (no result)
ALBUMIN SERPL-MCNC: 3.3 G/DL (ref 3.4–5)
ALP SERPL-CCNC: 96 U/L (ref 45–117)
ALT SERPL-CCNC: 34 U/L (ref 12–78)
ANION GAP SERPL CALC-SCNC: 10 MMOL/L (ref 5–15)
ANISOCYTOSIS BLD QL SMEAR: (no result)
BAND#(MANUAL): 0.05 X10^3/UL
BASOPHILS NFR BLD MANUAL: 1 % (ref 0–1)
BASOS#(MANUAL): 0.05 X10^3/UL (ref 0–0.1)
BILIRUB SERPL-MCNC: 0.2 MG/DL (ref 0.2–1)
CALCIUM SERPL-MCNC: 7.3 MG/DL (ref 8.5–10.1)
CHLORIDE SERPL-SCNC: 114 MMOL/L (ref 98–107)
CREAT SERPL-MCNC: 1.09 MG/DL (ref 0.55–1.02)
EOS#(MANUAL): 0.16 X10^3/UL (ref 0–0.4)
EOS% (MANUAL): 3 % (ref 1–7)
ERYTHROCYTE [DISTWIDTH] IN BLOOD BY AUTOMATED COUNT: 19.1 % (ref 9.6–15.2)
LYMPH#(MANUAL): 2.32 X10^3/UL (ref 1–3.4)
LYMPHS% (MANUAL): 43 % (ref 22–44)
MCH RBC QN AUTO: 28.8 PG (ref 27–34.8)
MCHC RBC AUTO-ENTMCNC: 32.9 G/DL (ref 32.4–35.8)
MONOS#(MANUAL): 0.27 X10^3/UL (ref 0.3–2.7)
MONOS% (MANUAL): 5 % (ref 2–9)
NEUTS BAND NFR BLD: 1 % (ref 0–7)
OVALOCYTES BLD QL SMEAR: (no result)
PLATELET # BLD AUTO: 309 X10^3/UL (ref 130–400)
PMV BLD AUTO: 7.4 FL (ref 7.4–10.4)
POLYCHROMASIA BLD QL SMEAR: (no result)
PROT SERPL-MCNC: 7 G/DL (ref 6.4–8.2)
RBC # BLD AUTO: 3.17 X10^6/UL (ref 3.82–5.3)
REACTIVE LYMPHS # (MANUAL): 0.27 X10^3/UL (ref 0–0)
REACTIVE LYMPHS % (MANUAL): 5 % (ref 0–0)
SEG#(MANUAL): 2.27 X10^3/UL (ref 1.8–6.8)
SEGS% (MANUAL): 42 % (ref 42–75)
TEAR DROPS: (no result)
VANCOMYCIN TROUGH SERPL-MCNC: < 1.7 MG/DL (ref 2.8–20)

## 2021-06-25 PROCEDURE — 80299 QUANTITATIVE ASSAY DRUG: CPT

## 2021-06-25 PROCEDURE — 84703 CHORIONIC GONADOTROPIN ASSAY: CPT

## 2021-06-25 PROCEDURE — 83690 ASSAY OF LIPASE: CPT

## 2021-06-25 PROCEDURE — 80307 DRUG TEST PRSMV CHEM ANLYZR: CPT

## 2021-06-25 PROCEDURE — 99285 EMERGENCY DEPT VISIT HI MDM: CPT

## 2021-06-25 PROCEDURE — 87635 SARS-COV-2 COVID-19 AMP PRB: CPT

## 2021-06-25 PROCEDURE — 80329 ANALGESICS NON-OPIOID 1 OR 2: CPT

## 2021-06-25 PROCEDURE — 80053 COMPREHEN METABOLIC PANEL: CPT

## 2021-06-25 PROCEDURE — 85025 COMPLETE CBC W/AUTO DIFF WBC: CPT

## 2021-06-25 PROCEDURE — 36415 COLL VENOUS BLD VENIPUNCTURE: CPT

## 2021-06-25 PROCEDURE — 80320 DRUG SCREEN QUANTALCOHOLS: CPT

## 2021-06-25 PROCEDURE — G0480 DRUG TEST DEF 1-7 CLASSES: HCPCS

## 2021-06-25 NOTE — NUR
pt BIBA, brought in because her boyfriend took her to [facility] for detox but when pt 
arrived there she was too drunk to get admitted, pt does have a bed there, pt 
here for medical clearance, pt blew a 0.389 in ambulance, pt keeps asking same 
qustions over and over, pt intoxicated, pt sleeping in bed

## 2021-06-26 VITALS — DIASTOLIC BLOOD PRESSURE: 77 MMHG | SYSTOLIC BLOOD PRESSURE: 103 MMHG

## 2021-06-26 NOTE — NUR
PT SLEEPING ON GURNEY, RESPIRATIONS EVEN AND UNLABORED, NADN/VSS. PT IN SAFE 
ENVIRONMENT. SITTER IN VIEW.

## 2021-06-26 NOTE — NUR
TP RN: SPOKE W/ PB SHIPMAN RN [facility], THERE IS A BED AVAILABLE FOR PT, PT NOT 
ON HOLD SO WE CAN TRANSPORT BY TAXI IF PT IS ABLE.

## 2021-06-26 NOTE — NUR
this RN attempted to have pt use breathalyzer but pt unable to blow long enough 
or hard enough to get a reading, pt went back to sleep, all needs in reach, 
call light in reach, NAD

## 2021-06-26 NOTE — NUR
Patient given discharge instructions and they have confirmed that they 
understand the instructions.  Patient ambulatory with steady gait.TAXI VOUCHER 
GIVEN TO [facility]

## 2021-06-26 NOTE — NUR
PT AWAKE AND ORIENTED AT THIS TIME, PT ABLE TO COMPLETE BREATHALYZER AND BLEW A 
.035, PT WAS CONFUSED ON HOW SHE GOT HERE SO THIS RN EXPLAINED SHE WAS SENT BY 
[facility] FOR MEIDCAL CLEARANCE, PT SITTING IN BED TALKING TO HER BOYFRIEND ON THE 
PHONE, PT WAS ABLE TO WALK TO THE BATHROOM AND PROVIDE URINE SAMPLE, ALL NEEDS 
IN REACH, CALL LIGHT IN REACH, NAD

## 2021-06-26 NOTE — NUR
pt sleeping in bed, all needs in reach, call light in reach, pt started on 1LPM 
nasal canula oxygen, NAD, pt not sober enough to use breathalyzer

## 2021-06-26 NOTE — NUR
PT SITTING UP IN BED AND PT ASKED IF SHE CAN BE SENT TO [facility] RIGHT NOW SINCE WE 
ARE NOT DOING ANYTHING FOR HER HERE, THIS RN ASKED WHAT SHE WANTED AND PT 
STATED, "WELL AT [facility] THEY GIVE ME VALIUM AND STUFF", THIS RN STATED WE CAN GET 
HER SOME HERE IF SHE FELT SHE NEEDED IT, MD ORDERED VALIUM PO, THIS RN GAVE PT 
MED AND NOW PT BACK TO SLEEP, ALL NEEDS IN REACH, CALL LIGHT IN REACH, NAD

## 2021-07-30 ENCOUNTER — HOSPITAL ENCOUNTER (OUTPATIENT)
Dept: LAB | Facility: MEDICAL CENTER | Age: 41
End: 2021-07-30
Attending: FAMILY MEDICINE
Payer: COMMERCIAL

## 2021-07-30 LAB
BASOPHILS # BLD AUTO: 0.8 % (ref 0–1.8)
BASOPHILS # BLD: 0.05 K/UL (ref 0–0.12)
EOSINOPHIL # BLD AUTO: 0.17 K/UL (ref 0–0.51)
EOSINOPHIL NFR BLD: 2.7 % (ref 0–6.9)
ERYTHROCYTE [DISTWIDTH] IN BLOOD BY AUTOMATED COUNT: 54.6 FL (ref 35.9–50)
HCT VFR BLD AUTO: 29.7 % (ref 37–47)
HGB BLD-MCNC: 8.9 G/DL (ref 12–16)
IMM GRANULOCYTES # BLD AUTO: 0.01 K/UL (ref 0–0.11)
IMM GRANULOCYTES NFR BLD AUTO: 0.2 % (ref 0–0.9)
LYMPHOCYTES # BLD AUTO: 2.17 K/UL (ref 1–4.8)
LYMPHOCYTES NFR BLD: 34.4 % (ref 22–41)
MCH RBC QN AUTO: 26.6 PG (ref 27–33)
MCHC RBC AUTO-ENTMCNC: 30 G/DL (ref 33.6–35)
MCV RBC AUTO: 88.7 FL (ref 81.4–97.8)
MONOCYTES # BLD AUTO: 0.46 K/UL (ref 0–0.85)
MONOCYTES NFR BLD AUTO: 7.3 % (ref 0–13.4)
NEUTROPHILS # BLD AUTO: 3.45 K/UL (ref 2–7.15)
NEUTROPHILS NFR BLD: 54.6 % (ref 44–72)
NRBC # BLD AUTO: 0 K/UL
NRBC BLD-RTO: 0 /100 WBC
PLATELET # BLD AUTO: 286 K/UL (ref 164–446)
PMV BLD AUTO: 10.3 FL (ref 9–12.9)
RBC # BLD AUTO: 3.35 M/UL (ref 4.2–5.4)
T3FREE SERPL-MCNC: 2.03 PG/ML (ref 2–4.4)
T4 FREE SERPL-MCNC: 0.66 NG/DL (ref 0.93–1.7)
TSH SERPL DL<=0.005 MIU/L-ACNC: 1.32 UIU/ML (ref 0.38–5.33)
WBC # BLD AUTO: 6.3 K/UL (ref 4.8–10.8)

## 2021-07-30 PROCEDURE — 84481 FREE ASSAY (FT-3): CPT

## 2021-07-30 PROCEDURE — 85025 COMPLETE CBC W/AUTO DIFF WBC: CPT

## 2021-07-30 PROCEDURE — 84443 ASSAY THYROID STIM HORMONE: CPT

## 2021-07-30 PROCEDURE — 84439 ASSAY OF FREE THYROXINE: CPT

## 2021-07-30 PROCEDURE — 36415 COLL VENOUS BLD VENIPUNCTURE: CPT

## 2021-07-31 ENCOUNTER — HOSPITAL ENCOUNTER (OUTPATIENT)
Dept: LAB | Facility: MEDICAL CENTER | Age: 41
End: 2021-07-31
Attending: FAMILY MEDICINE
Payer: COMMERCIAL

## 2021-07-31 LAB
ALBUMIN SERPL BCP-MCNC: 4.1 G/DL (ref 3.2–4.9)
ALBUMIN/GLOB SERPL: 1.3 G/DL
ALP SERPL-CCNC: 92 U/L (ref 30–99)
ALT SERPL-CCNC: 14 U/L (ref 2–50)
ANION GAP SERPL CALC-SCNC: 11 MMOL/L (ref 7–16)
AST SERPL-CCNC: 28 U/L (ref 12–45)
BILIRUB SERPL-MCNC: 0.2 MG/DL (ref 0.1–1.5)
BUN SERPL-MCNC: 20 MG/DL (ref 8–22)
CALCIUM SERPL-MCNC: 8.8 MG/DL (ref 8.5–10.5)
CHLORIDE SERPL-SCNC: 106 MMOL/L (ref 96–112)
CHOLEST SERPL-MCNC: 195 MG/DL (ref 100–199)
CO2 SERPL-SCNC: 20 MMOL/L (ref 20–33)
CREAT SERPL-MCNC: 1.04 MG/DL (ref 0.5–1.4)
FASTING STATUS PATIENT QL REPORTED: NORMAL
GLOBULIN SER CALC-MCNC: 3.1 G/DL (ref 1.9–3.5)
GLUCOSE SERPL-MCNC: 98 MG/DL (ref 65–99)
HDLC SERPL-MCNC: 69 MG/DL
LDLC SERPL CALC-MCNC: 99 MG/DL
POTASSIUM SERPL-SCNC: 4.8 MMOL/L (ref 3.6–5.5)
PROT SERPL-MCNC: 7.2 G/DL (ref 6–8.2)
SODIUM SERPL-SCNC: 137 MMOL/L (ref 135–145)
TRIGL SERPL-MCNC: 134 MG/DL (ref 0–149)

## 2021-07-31 PROCEDURE — 80053 COMPREHEN METABOLIC PANEL: CPT

## 2021-07-31 PROCEDURE — 36415 COLL VENOUS BLD VENIPUNCTURE: CPT

## 2021-07-31 PROCEDURE — 80061 LIPID PANEL: CPT

## 2021-08-05 ENCOUNTER — HOSPITAL ENCOUNTER (EMERGENCY)
Facility: MEDICAL CENTER | Age: 41
End: 2021-08-05
Attending: EMERGENCY MEDICINE
Payer: COMMERCIAL

## 2021-08-05 VITALS
BODY MASS INDEX: 33.24 KG/M2 | TEMPERATURE: 97.9 F | OXYGEN SATURATION: 98 % | WEIGHT: 187.61 LBS | HEART RATE: 70 BPM | HEIGHT: 63 IN | RESPIRATION RATE: 17 BRPM | SYSTOLIC BLOOD PRESSURE: 150 MMHG | DIASTOLIC BLOOD PRESSURE: 95 MMHG

## 2021-08-05 DIAGNOSIS — D64.9 MILD ANEMIA: ICD-10-CM

## 2021-08-05 LAB
ALBUMIN SERPL BCP-MCNC: 4.2 G/DL (ref 3.2–4.9)
ALBUMIN/GLOB SERPL: 1.3 G/DL
ALP SERPL-CCNC: 102 U/L (ref 30–99)
ALT SERPL-CCNC: 16 U/L (ref 2–50)
ANION GAP SERPL CALC-SCNC: 12 MMOL/L (ref 7–16)
APPEARANCE UR: CLEAR
AST SERPL-CCNC: 31 U/L (ref 12–45)
BACTERIA #/AREA URNS HPF: NEGATIVE /HPF
BASOPHILS # BLD AUTO: 0.7 % (ref 0–1.8)
BASOPHILS # BLD: 0.05 K/UL (ref 0–0.12)
BILIRUB SERPL-MCNC: <0.2 MG/DL (ref 0.1–1.5)
BILIRUB UR QL STRIP.AUTO: NEGATIVE
BUN SERPL-MCNC: 10 MG/DL (ref 8–22)
CALCIUM SERPL-MCNC: 9.2 MG/DL (ref 8.5–10.5)
CHLORIDE SERPL-SCNC: 106 MMOL/L (ref 96–112)
CO2 SERPL-SCNC: 20 MMOL/L (ref 20–33)
COLOR UR: YELLOW
CREAT SERPL-MCNC: 1.07 MG/DL (ref 0.5–1.4)
EOSINOPHIL # BLD AUTO: 0.15 K/UL (ref 0–0.51)
EOSINOPHIL NFR BLD: 2 % (ref 0–6.9)
EPI CELLS #/AREA URNS HPF: NEGATIVE /HPF
ERYTHROCYTE [DISTWIDTH] IN BLOOD BY AUTOMATED COUNT: 52.9 FL (ref 35.9–50)
GLOBULIN SER CALC-MCNC: 3.2 G/DL (ref 1.9–3.5)
GLUCOSE SERPL-MCNC: 155 MG/DL (ref 65–99)
GLUCOSE UR STRIP.AUTO-MCNC: NEGATIVE MG/DL
HCG SERPL QL: NEGATIVE
HCT VFR BLD AUTO: 31.4 % (ref 37–47)
HGB BLD-MCNC: 9.5 G/DL (ref 12–16)
HYALINE CASTS #/AREA URNS LPF: NORMAL /LPF
IMM GRANULOCYTES # BLD AUTO: 0.02 K/UL (ref 0–0.11)
IMM GRANULOCYTES NFR BLD AUTO: 0.3 % (ref 0–0.9)
KETONES UR STRIP.AUTO-MCNC: NEGATIVE MG/DL
LEUKOCYTE ESTERASE UR QL STRIP.AUTO: NEGATIVE
LIPASE SERPL-CCNC: 5 U/L (ref 11–82)
LYMPHOCYTES # BLD AUTO: 2.43 K/UL (ref 1–4.8)
LYMPHOCYTES NFR BLD: 32.7 % (ref 22–41)
MCH RBC QN AUTO: 26.5 PG (ref 27–33)
MCHC RBC AUTO-ENTMCNC: 30.3 G/DL (ref 33.6–35)
MCV RBC AUTO: 87.7 FL (ref 81.4–97.8)
MICRO URNS: ABNORMAL
MONOCYTES # BLD AUTO: 0.4 K/UL (ref 0–0.85)
MONOCYTES NFR BLD AUTO: 5.4 % (ref 0–13.4)
NEUTROPHILS # BLD AUTO: 4.39 K/UL (ref 2–7.15)
NEUTROPHILS NFR BLD: 58.9 % (ref 44–72)
NITRITE UR QL STRIP.AUTO: NEGATIVE
NRBC # BLD AUTO: 0 K/UL
NRBC BLD-RTO: 0 /100 WBC
PH UR STRIP.AUTO: 5.5 [PH] (ref 5–8)
PLATELET # BLD AUTO: 283 K/UL (ref 164–446)
PMV BLD AUTO: 10.4 FL (ref 9–12.9)
POTASSIUM SERPL-SCNC: 4.2 MMOL/L (ref 3.6–5.5)
PROT SERPL-MCNC: 7.4 G/DL (ref 6–8.2)
PROT UR QL STRIP: 30 MG/DL
RBC # BLD AUTO: 3.58 M/UL (ref 4.2–5.4)
RBC # URNS HPF: NORMAL /HPF
RBC UR QL AUTO: NEGATIVE
SODIUM SERPL-SCNC: 138 MMOL/L (ref 135–145)
SP GR UR STRIP.AUTO: 1.02
UROBILINOGEN UR STRIP.AUTO-MCNC: 0.2 MG/DL
WBC # BLD AUTO: 7.4 K/UL (ref 4.8–10.8)
WBC #/AREA URNS HPF: NORMAL /HPF

## 2021-08-05 PROCEDURE — 84703 CHORIONIC GONADOTROPIN ASSAY: CPT

## 2021-08-05 PROCEDURE — 83690 ASSAY OF LIPASE: CPT

## 2021-08-05 PROCEDURE — 85025 COMPLETE CBC W/AUTO DIFF WBC: CPT

## 2021-08-05 PROCEDURE — 81001 URINALYSIS AUTO W/SCOPE: CPT

## 2021-08-05 PROCEDURE — 80053 COMPREHEN METABOLIC PANEL: CPT

## 2021-08-05 PROCEDURE — 99284 EMERGENCY DEPT VISIT MOD MDM: CPT

## 2021-08-05 RX ORDER — LANOLIN ALCOHOL/MO/W.PET/CERES
325 CREAM (GRAM) TOPICAL
Qty: 90 TABLET | Refills: 0 | Status: SHIPPED | OUTPATIENT
Start: 2021-08-05 | End: 2021-10-21

## 2021-08-05 RX ORDER — BUSPIRONE HYDROCHLORIDE 15 MG/1
15 TABLET ORAL 2 TIMES DAILY
Status: SHIPPED | COMMUNITY
End: 2021-08-24 | Stop reason: SDUPTHER

## 2021-08-05 RX ORDER — GABAPENTIN 300 MG/1
300 CAPSULE ORAL 2 TIMES DAILY
Status: SHIPPED | COMMUNITY
End: 2021-10-21

## 2021-08-05 RX ORDER — ACAMPROSATE CALCIUM 333 MG/1
666 TABLET, DELAYED RELEASE ORAL 3 TIMES DAILY
Status: SHIPPED | COMMUNITY
End: 2021-10-21

## 2021-08-05 RX ORDER — AMOXICILLIN 250 MG
1 CAPSULE ORAL DAILY
Qty: 30 TABLET | Refills: 0 | Status: SHIPPED | OUTPATIENT
Start: 2021-08-05 | End: 2021-10-21

## 2021-08-05 ASSESSMENT — FIBROSIS 4 INDEX: FIB4 SCORE: 1.1

## 2021-08-05 NOTE — ED NOTES
Med Rec completed: per pt at bedside.     No ORAL antibiotics in last 30 days    [facility]: [phone]    Pt confirmed following allergies:  No Known Allergies     Pt's home medications:   No current facility-administered medications on file prior to encounter.     Medication Sig   • gabapentin (NEURONTIN) 300 MG Cap Take 300 mg by mouth 2 times a day.   • busPIRone (BUSPAR) 15 MG tablet Take 15 mg by mouth 2 times a day.   • acamprosate (CAMPRAL) 333 MG tablet Take 666 mg by mouth 3 times a day.   • traZODone (DESYREL) 100 MG Tab Take 1 Tab by mouth at bedtime as needed for Sleep.   • naltrexone (DEPADE) 50 MG Tab Take 1 Tab by mouth every day.   • FLUoxetine (PROZAC) 20 MG Cap Take 1 Cap by mouth every day.   • hydrOXYzine pamoate (VISTARIL) 50 MG Cap Take 1 Cap by mouth 2 times a day as needed for Anxiety.       Removed medications:   Medication Removal Reason   • [DISCONTINUED] thiamine (THIAMINE) 100 MG tablet Pt reports not taking     • [DISCONTINUED] multivitamin (THERAGRAN) Tab Pt reports not taking     • [DISCONTINUED] gabapentin (NEURONTIN) 300 MG Cap Reconciled dosing info, 300 mg twice daily.     • [DISCONTINUED] folic acid (FOLVITE) 1 MG Tab Pt reports not taking     • [DISCONTINUED] omeprazole (PRILOSEC) 40 MG delayed-release capsule  Pt reports not taking

## 2021-08-05 NOTE — ED NOTES
Discussed DC with patient, patient verbalized understanding regarding follow up and medications. Patient A&Ox4 prior to DC.

## 2021-08-05 NOTE — ED PROVIDER NOTES
ED Provider Note    CHIEF COMPLAINT  Chief Complaint   Patient presents with   • Abnormal Labs     pt sent from [facility] for her h&h. reports being told she needs a blood test. c/o abd blooding and weight gain.    • Dizziness     c/o fatigue and dizziness.        HPI  Donna Morales is a 41 y.o. female with history of chronic alcohol abuse, now sober for the last 45 days, here for abnormal labs.  Patient apparently had her evaluation by her primary care physician and was found to have a low hemoglobin.  She was told to come to the emergency department for further care.  Patient reports some fatigue and dizziness.  Patient denies any hematochezia, melena, hematemesis, fevers, chills or associated abdominal pain.  She does report that she has had some abdominal bloating.  Patient denies any vomiting.  Patient denies any vaginal bleeding outside of normal.  Patient denies any associated recent trauma.    REVIEW OF SYSTEMS  ROS    See HPI for further details. All other systems are negative.    PAST MEDICAL HISTORY   has a past medical history of Alcoholism (HCC), Anemia, Anesthesia, Anxiety, Dialysis (2008), Heart burn, Hypertension, Kidney failure (2008), Liver failure (HCC), Other specified symptom associated with female genital organs, Pancreatitis, and Wrist pain.    SOCIAL HISTORY  Social History     Tobacco Use   • Smoking status: Never Smoker   • Smokeless tobacco: Never Used   Vaping Use   • Vaping Use: Never used   Substance and Sexual Activity   • Alcohol use: Yes     Comment: sober 45 day 8/5/2021   • Drug use: Not Currently   • Sexual activity: Yes     Partners: Male     Birth control/protection: Patch       SURGICAL HISTORY   has a past surgical history that includes lissa by laparoscopy (2006); laparoscopy (2000); and flexor tendon repair (7/18/2012).    CURRENT MEDICATIONS  Home Medications     Reviewed by Cristy Fabian R.N. (Registered Nurse) on 08/05/21 at 1241  Med List Status: Not Addressed    Medication Last Dose Status   FLUoxetine (PROZAC) 20 MG Cap  Active   folic acid (FOLVITE) 1 MG Tab  Active   gabapentin (NEURONTIN) 300 MG Cap  Active   hydrOXYzine pamoate (VISTARIL) 50 MG Cap  Active   multivitamin (THERAGRAN) Tab  Active   naltrexone (DEPADE) 50 MG Tab  Active   omeprazole (PRILOSEC) 40 MG delayed-release capsule  Active   thiamine (THIAMINE) 100 MG tablet  Active   traZODone (DESYREL) 100 MG Tab  Active                ALLERGIES  No Known Allergies    PHYSICAL EXAM  Vitals:    08/05/21 1234   BP: 141/98   Pulse: 78   Resp: 16   Temp: 36.6 °C (97.9 °F)   SpO2: 98%       Physical Exam  Constitutional:       Appearance: She is well-developed.   HENT:      Head: Normocephalic and atraumatic.   Eyes:      Conjunctiva/sclera: Conjunctivae normal.   Cardiovascular:      Rate and Rhythm: Normal rate and regular rhythm.   Pulmonary:      Effort: Pulmonary effort is normal.      Breath sounds: Normal breath sounds.   Abdominal:      General: Bowel sounds are normal. There is no distension.      Palpations: Abdomen is soft.      Tenderness: There is no abdominal tenderness. There is no rebound.   Genitourinary:     Comments: Fecal occult is negative for blood  Musculoskeletal:      Cervical back: Normal range of motion and neck supple.   Skin:     General: Skin is warm and dry.      Findings: No rash.   Neurological:      Mental Status: She is alert and oriented to person, place, and time.   Psychiatric:         Behavior: Behavior normal.           DIAGNOSTIC STUDIES / PROCEDURES    LABS  Results for orders placed or performed during the hospital encounter of 08/05/21   CBC WITH DIFFERENTIAL   Result Value Ref Range    WBC 7.4 4.8 - 10.8 K/uL    RBC 3.58 (L) 4.20 - 5.40 M/uL    Hemoglobin 9.5 (L) 12.0 - 16.0 g/dL    Hematocrit 31.4 (L) 37.0 - 47.0 %    MCV 87.7 81.4 - 97.8 fL    MCH 26.5 (L) 27.0 - 33.0 pg    MCHC 30.3 (L) 33.6 - 35.0 g/dL    RDW 52.9 (H) 35.9 - 50.0 fL    Platelet Count 283 164 - 446  K/uL    MPV 10.4 9.0 - 12.9 fL    Neutrophils-Polys 58.90 44.00 - 72.00 %    Lymphocytes 32.70 22.00 - 41.00 %    Monocytes 5.40 0.00 - 13.40 %    Eosinophils 2.00 0.00 - 6.90 %    Basophils 0.70 0.00 - 1.80 %    Immature Granulocytes 0.30 0.00 - 0.90 %    Nucleated RBC 0.00 /100 WBC    Neutrophils (Absolute) 4.39 2.00 - 7.15 K/uL    Lymphs (Absolute) 2.43 1.00 - 4.80 K/uL    Monos (Absolute) 0.40 0.00 - 0.85 K/uL    Eos (Absolute) 0.15 0.00 - 0.51 K/uL    Baso (Absolute) 0.05 0.00 - 0.12 K/uL    Immature Granulocytes (abs) 0.02 0.00 - 0.11 K/uL    NRBC (Absolute) 0.00 K/uL   COMP METABOLIC PANEL   Result Value Ref Range    Sodium 138 135 - 145 mmol/L    Potassium 4.2 3.6 - 5.5 mmol/L    Chloride 106 96 - 112 mmol/L    Co2 20 20 - 33 mmol/L    Anion Gap 12.0 7.0 - 16.0    Glucose 155 (H) 65 - 99 mg/dL    Bun 10 8 - 22 mg/dL    Creatinine 1.07 0.50 - 1.40 mg/dL    Calcium 9.2 8.5 - 10.5 mg/dL    AST(SGOT) 31 12 - 45 U/L    ALT(SGPT) 16 2 - 50 U/L    Alkaline Phosphatase 102 (H) 30 - 99 U/L    Total Bilirubin <0.2 0.1 - 1.5 mg/dL    Albumin 4.2 3.2 - 4.9 g/dL    Total Protein 7.4 6.0 - 8.2 g/dL    Globulin 3.2 1.9 - 3.5 g/dL    A-G Ratio 1.3 g/dL   LIPASE   Result Value Ref Range    Lipase 5 (L) 11 - 82 U/L   HCG QUAL SERUM   Result Value Ref Range    Beta-Hcg Qualitative Serum Negative Negative   URINALYSIS,CULTURE IF INDICATED    Specimen: Blood   Result Value Ref Range    Color Yellow     Character Clear     Specific Gravity 1.016 <1.035    Ph 5.5 5.0 - 8.0    Glucose Negative Negative mg/dL    Ketones Negative Negative mg/dL    Protein 30 (A) Negative mg/dL    Bilirubin Negative Negative    Urobilinogen, Urine 0.2 Negative    Nitrite Negative Negative    Leukocyte Esterase Negative Negative    Occult Blood Negative Negative    Micro Urine Req Microscopic    URINE MICROSCOPIC (W/UA)   Result Value Ref Range    WBC 0-2 /hpf    RBC 0-2 /hpf    Bacteria Negative None /hpf    Epithelial Cells Negative /hpf    Hyaline  Cast 0-2 /lpf   ESTIMATED GFR   Result Value Ref Range    GFR If African American >60 >60 mL/min/1.73 m 2    GFR If Non  56 (A) >60 mL/min/1.73 m 2         RADIOLOGY  No orders to display       COURSE & MEDICAL DECISION MAKING  Pertinent Labs & Imaging studies reviewed. (See chart for details)    Patient here with questionable anemia found on outpatient labs.  Patient without any complaints of any bleeding outside of normal.  She is fecal occult negative from below.  Will check patient's hemoglobin here to confirm.  Patient's exam is otherwise entirely unremarkable.  I believe surgical pathology is highly unlikely.  She does not have evidence of ascites clinically on exam.  Patient with entirely unremarkable vitals.  Patient basic labs are entirely unremarkable.  I discussed the case with her primary care physician, he will follow up with patient as an outpatient.  I started patient on iron and stool softeners.  I discussed return precautions with patient.    The patient will return for worsening symptoms and is stable at the time of discharge. The patient verbalizes understanding and will comply.    FINAL IMPRESSION    1. Mild anemia      .         Electronically signed by: Hong Hansen M.D., 8/5/2021 2:20 PM

## 2021-08-05 NOTE — ED TRIAGE NOTES
"..  Chief Complaint   Patient presents with   • Abnormal Labs     [facility] for [facility]. reports being told she needs a blood test. c/o abd blooding and weight gain.    • Dizziness     c/o fatigue and dizziness.          ./98   Pulse 78   Temp 36.6 °C (97.9 °F) (Temporal)   Resp 16   Ht 1.6 m (5' 3\")   Wt 85.1 kg (187 lb 9.8 oz)   SpO2 98%        Explained triage process, to waiting room. Asked to inform RN if questions or concerns arise.   "

## 2021-08-24 ENCOUNTER — TELEMEDICINE (OUTPATIENT)
Dept: BEHAVIORAL HEALTH | Facility: CLINIC | Age: 41
End: 2021-08-24
Payer: COMMERCIAL

## 2021-08-24 VITALS — BODY MASS INDEX: 33.23 KG/M2 | HEIGHT: 63 IN

## 2021-08-24 DIAGNOSIS — F43.23 ADJUSTMENT DISORDER WITH MIXED ANXIETY AND DEPRESSED MOOD: ICD-10-CM

## 2021-08-24 DIAGNOSIS — F41.9 ANXIETY DISORDER, UNSPECIFIED TYPE: ICD-10-CM

## 2021-08-24 DIAGNOSIS — F51.04 CHRONIC INSOMNIA: ICD-10-CM

## 2021-08-24 DIAGNOSIS — F10.20 ALCOHOL USE DISORDER, SEVERE, DEPENDENCE (HCC): ICD-10-CM

## 2021-08-24 PROCEDURE — 90833 PSYTX W PT W E/M 30 MIN: CPT | Mod: 95 | Performed by: PSYCHIATRY & NEUROLOGY

## 2021-08-24 PROCEDURE — 99214 OFFICE O/P EST MOD 30 MIN: CPT | Mod: 95 | Performed by: PSYCHIATRY & NEUROLOGY

## 2021-08-24 RX ORDER — BUSPIRONE HYDROCHLORIDE 15 MG/1
15 TABLET ORAL 2 TIMES DAILY
Qty: 60 TABLET | Refills: 0 | Status: SHIPPED | OUTPATIENT
Start: 2021-08-24 | End: 2021-09-16 | Stop reason: SDUPTHER

## 2021-08-24 RX ORDER — TRAZODONE HYDROCHLORIDE 100 MG/1
200 TABLET ORAL
Qty: 60 TABLET | Refills: 0 | Status: SHIPPED | OUTPATIENT
Start: 2021-08-24 | End: 2021-09-16 | Stop reason: DRUGHIGH

## 2021-08-24 RX ORDER — FLUOXETINE HYDROCHLORIDE 20 MG/1
20 CAPSULE ORAL DAILY
Qty: 30 CAPSULE | Refills: 0 | Status: SHIPPED | OUTPATIENT
Start: 2021-08-24 | End: 2021-09-16 | Stop reason: SDUPTHER

## 2021-08-24 NOTE — PROGRESS NOTES
PSYCHIATRY VIRTUAL VISIT FOLLOW-UP NOTE      Chief Complaint   Patient presents with   • Depression   • Anxiety       This evaluation was conducted via [employer] using secure and encrypted videoconferencing technology. The patient was in a private location in the [facility].    The patient's identity was confirmed and verbal consent was obtained for this virtual visit.    History Of Present Illness:  Donna Morales is a 41 y.o. female with hypertension, alcohol use disorder, anxiety disorder, chronic insomnia comes in today for follow up, was last seen over 9 months.  She was seen with her mother today.  She has been having a tough time in regards to her mental health.  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  She is working with her union and has arbitration scheduled for March of next year.  She is having a hard time figuring out what sleep is for her.  Her mother is supportive and has been helping her both emotionally and financially.  She is having her sign up for [employer] insurance as well as unemployment.  She and her boyfriend are also taking a break from their relationship which is to help.  Her mother mentions that alcohol had a role to play in her contract not being renewed.  She has done all right on and off in regards to alcohol since her last appointment with me.  She did an outpatient rehab program at [facility] which went well but lately she has been drinking a bottle of vodka every day.  She has been off her psychiatric medications for about 2 weeks as well.  She mentions that since her last appointment her dose of Trazodone was increased to 200 mg and Buspar was added again.  She is having a hard time taking care of herself and mom is making sure that she is eating well.  She denies having thoughts of wanting to hurt herself.    Social History:   She and her boyfriend  "are taking a break from the relationship, lives alone in [place], good support from mother and grandmother and both live in town, unemployed.    Substance Use:  Alcohol - She has been drinking a bottle of vodka on a daily basis  Nicotine - Denies  Cannabis - Denies  Illicit drugs - Denies    Past Medication Trials:  Ativan, Topamax for alcohol use disorder (ineffective), Antabuse 500 mg    Medications:  Current Outpatient Medications   Medication Sig Dispense Refill   • busPIRone (BUSPAR) 15 MG tablet Take 1 Tablet by mouth 2 times a day. 60 Tablet 0   • FLUoxetine (PROZAC) 20 MG Cap Take 1 Capsule by mouth every day. 30 Capsule 0   • traZODone (DESYREL) 100 MG Tab Take 2 Tablets by mouth at bedtime. 60 Tablet 0   • senna-docusate (PERICOLACE OR SENOKOT S) 8.6-50 MG Tab Take 1 tablet by mouth every day. 30 tablet 0   • gabapentin (NEURONTIN) 300 MG Cap Take 300 mg by mouth 2 times a day. (Patient not taking: Reported on 8/24/2021)     • acamprosate (CAMPRAL) 333 MG tablet Take 666 mg by mouth 3 times a day. (Patient not taking: Reported on 8/24/2021)     • ferrous sulfate 325 (65 Fe) MG EC tablet Take 1 tablet by mouth 3 times a day with meals. (Patient not taking: Reported on 8/24/2021) 90 tablet 0   • naltrexone (DEPADE) 50 MG Tab Take 1 Tab by mouth every day. (Patient not taking: Reported on 8/24/2021) 90 Tab 0   • hydrOXYzine pamoate (VISTARIL) 50 MG Cap Take 1 Cap by mouth 2 times a day as needed for Anxiety. (Patient not taking: Reported on 8/24/2021) 180 Cap 0     No current facility-administered medications for this visit.       Review Of Systems:    Constitutional - Positive for fatigue  Psychiatric - Positive for anxiety, depression, alcohol use    Physical Examination:  Vital signs: Ht 1.6 m (5' 3\")   BMI 33.23 kg/m²     Musculoskeletal: No abnormal movements.     Mental Status Evaluation:   General: Young black female, tearful, dressed in casual attire, good grooming and hygiene, in no apparent distress, " "calm and cooperative, good eye contact, no psychomotor retardation  Orientation: Alert and oriented to person, place and time  Recent and remote memory: Grossly intact  Attention span and concentration: Grossly intact  Speech: Spontaneous, normal rate, rhythm and tone  Thought Process: Linear, logical and goal directed  Thought Content: Denies suicidal or homicidal ideations, intent or plan  Perception: No delusions noted  Associations: Intact  Language: Appropriate  Fund of knowledge and vocabulary: Grossly adequate  Mood: \"okay\"  Affect: Dysphoric, mood congruent  Insight: Good  Judgment: Good    Depression screening:  Depression Screen (PHQ-2/PHQ-9) 11/14/2019 2/19/2020 6/2/2021   PHQ-2 Total Score - - 0   PHQ-2 Total Score 0 0 -     Interpretation of PHQ-9 Total Score   Score Severity   1-4 No Depression   5-9 Mild Depression   10-14 Moderate Depression   15-19 Moderately Severe Depression   20-27 Severe Depression    Medical Records/Labs/Diagnostic Tests Reviewed:  [place] PDMP records - no prescribed controlled medications found in the last 2 years      Impression:  1.  Adjustment disorder with mixed anxiety and depression (unemployed, relationship stress) - new problem  2.  Alcohol use disorder, severe - worsening  3.  Unspecified anxiety disorder - worsening  4.  Chronic insomnia secondary to depression, anxiety, alcohol use - worsening    Plan:  1.  Restart Prozac 20 mg daily for depression and anxiety  2.  Restart Buspirone 15 mg twice daily for anxiety and depression augmentation  3.  Restart Trazodone 200 mg at bedtime for sleep  4.  Will hold off on restarting Naltrexone, Campral, Neurontin and Hydroxyzine to minimize side effects since she has been off medications for a few weeks  5.  I have encouraged her to start cutting back on her alcohol use and to think about temporarily moving in with grandmother for additional support  6.  Discussed with mother to look into inpatient rehab once she has [employer] as she " has failed several outpatient rehab programs  7.  Provided supportive psychotherapy (> 16 minutes) in regards to increased stress and alcohol use.  Provided emotional support in regards to the tough times that she has been going through.  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  Encouraged her to use to support from mother and grandmother as much as she can.    Return to clinic in 1 week or sooner if symptoms worsen    The proposed treatment plan was discussed with the patient who was provided the opportunity to ask questions and make suggestions regarding alternative treatment. Patient verbalized understanding and expressed agreement with the plan.     Sujey Dunbar M.D.  08/24/21    This note was created using voice recognition software (Dragon). The accuracy of the dictation is limited by the abilities of the software. I have reviewed the note prior to signing, however some errors in grammar and context are still possible. If you have any questions related to this note please do not hesitate to contact our office.

## 2021-09-02 ENCOUNTER — TELEMEDICINE (OUTPATIENT)
Dept: BEHAVIORAL HEALTH | Facility: CLINIC | Age: 41
End: 2021-09-02
Payer: COMMERCIAL

## 2021-09-02 VITALS — BODY MASS INDEX: 33.23 KG/M2 | HEIGHT: 63 IN

## 2021-09-02 DIAGNOSIS — F10.20 ALCOHOL USE DISORDER, SEVERE, DEPENDENCE (HCC): ICD-10-CM

## 2021-09-02 DIAGNOSIS — F43.23 ADJUSTMENT DISORDER WITH MIXED ANXIETY AND DEPRESSED MOOD: ICD-10-CM

## 2021-09-02 DIAGNOSIS — F41.9 ANXIETY DISORDER, UNSPECIFIED TYPE: ICD-10-CM

## 2021-09-02 DIAGNOSIS — F51.04 CHRONIC INSOMNIA: ICD-10-CM

## 2021-09-02 PROCEDURE — 99214 OFFICE O/P EST MOD 30 MIN: CPT | Mod: 95 | Performed by: PSYCHIATRY & NEUROLOGY

## 2021-09-02 NOTE — PROGRESS NOTES
PSYCHIATRY VIRTUAL VISIT FOLLOW-UP NOTE      Chief Complaint   Patient presents with   • Follow-Up     alcohol use disorder, anxiety, depression       This evaluation was conducted via [employer]Jane using secure and encrypted videoconferencing technology. The patient was in a private location in the [facility].    The patient's identity was confirmed and verbal consent was obtained for this virtual visit.    History Of Present Illness:  Donna Morales is a 41 y.o. female with hypertension, alcohol use disorder, anxiety disorder, chronic insomnia comes in today for follow up, was last seen 10 days ago.  She was seen with her mother today.  Her mother has noticed that she has been a bit better since her last appointment with me.  Her mother has been slowly tapering her off alcohol.  Her mother mentioned that she is rationing of alcohol.  She is down to maybe half to three-quarter bottle of vodka every day.  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  She has been seeing her mother on a daily basis now.  She has been taking 300 mg of trazodone as a 200 is not helping her sleep.  She is still sleeping about 3 or 4 hours.  She is still having depression and anxiety.  However, her affect was last appointment and she was tearful on the months.  She denies having thoughts of wanting to hurt herself.    Social History:   She and her boyfriend are taking a break from the relationship, lives alone in [place], good support from mother and grandmother and both live in [facility], unemployed.    Substance Use:  Alcohol - She has cut down on her alcohol use and is drinking half to three-quarter bottle of vodka daily now  Nicotine - Denies  Cannabis - Denies  Illicit drugs - Denies    Past Medication Trials:  Ativan, Topamax for alcohol use disorder (ineffective), Antabuse 500 mg    Medications:  Current Outpatient Medications   Medication Sig Dispense Refill   • busPIRone (BUSPAR) 15 MG tablet Take 1  "Tablet by mouth 2 times a day. 60 Tablet 0   • FLUoxetine (PROZAC) 20 MG Cap Take 1 Capsule by mouth every day. 30 Capsule 0   • traZODone (DESYREL) 100 MG Tab Take 2 Tablets by mouth at bedtime. (Patient taking differently: Take 300 mg by mouth at bedtime.) 60 Tablet 0   • senna-docusate (PERICOLACE OR SENOKOT S) 8.6-50 MG Tab Take 1 tablet by mouth every day. 30 tablet 0   • gabapentin (NEURONTIN) 300 MG Cap Take 300 mg by mouth 2 times a day. (Patient not taking: Reported on 8/24/2021)     • acamprosate (CAMPRAL) 333 MG tablet Take 666 mg by mouth 3 times a day. (Patient not taking: Reported on 8/24/2021)     • ferrous sulfate 325 (65 Fe) MG EC tablet Take 1 tablet by mouth 3 times a day with meals. (Patient not taking: Reported on 8/24/2021) 90 tablet 0   • naltrexone (DEPADE) 50 MG Tab Take 1 Tab by mouth every day. (Patient not taking: Reported on 8/24/2021) 90 Tab 0   • hydrOXYzine pamoate (VISTARIL) 50 MG Cap Take 1 Cap by mouth 2 times a day as needed for Anxiety. (Patient not taking: Reported on 8/24/2021) 180 Cap 0     No current facility-administered medications for this visit.       Review Of Systems:    Constitutional - Positive for fatigue  Psychiatric - Positive for anxiety, depression, alcohol use, poor sleep    Physical Examination:  Vital signs: Ht 1.6 m (5' 3\")   BMI 33.23 kg/m²     Musculoskeletal: No abnormal movements.     Mental Status Evaluation:   General: Young black female, tearful, dressed in casual attire, good grooming and hygiene, in no apparent distress, calm and cooperative, good eye contact, no psychomotor retardation  Orientation: Alert and oriented to person, place and time  Recent and remote memory: Grossly intact  Attention span and concentration: Grossly intact  Speech: Spontaneous, normal rate, rhythm and tone  Thought Process: Linear, logical and goal directed  Thought Content: Denies suicidal or homicidal ideations, intent or plan  Perception: No delusions " "noted  Associations: Intact  Language: Appropriate  Fund of knowledge and vocabulary: Grossly adequate  Mood: \"okay\"  Affect: Dysphoric, mood congruent  Insight: Good  Judgment: Good    Depression screening:  Depression Screen (PHQ-2/PHQ-9) 11/14/2019 2/19/2020 6/2/2021   PHQ-2 Total Score - - 0   PHQ-2 Total Score 0 0 -     Interpretation of PHQ-9 Total Score   Score Severity   1-4 No Depression   5-9 Mild Depression   10-14 Moderate Depression   15-19 Moderately Severe Depression   20-27 Severe Depression    Medical Records/Labs/Diagnostic Tests Reviewed:  [place] PDMP records - no prescribed controlled medications found in the last 2 years      Impression:  1.  Adjustment disorder with mixed anxiety and depression (unemployed, relationship stress) - stable  2.  Alcohol use disorder, severe - slight improvement   3.  Unspecified anxiety disorder - stable  4.  Chronic insomnia secondary to depression, anxiety, alcohol use - stable    Plan:  1.  Continue Prozac 20 mg daily for depression and anxiety  2.  Continue Buspirone 15 mg twice daily for anxiety and depression augmentation  3.  Increase Trazodone to 300 mg at bedtime for sleep  4.  Will continue to hold off on Naltrexone, Campral, Neurontin and Hydroxyzine  5.  I have advised her to continue to slowly cut down on her alcohol use.  Discussed local inpatient rehab resources and advised her to call those places and make an intake appointment.  She would really benefit from a 30 to 60-day inpatient rehab as she has failed outpatient rehab several times over the last few years.  6.  Provided supportive psychotherapy.  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  Encouraged her to spend time with her mother and grandmother for her biggest support system at this point.    Return to clinic in 2 weeks or sooner if symptoms worsen    The proposed treatment plan was discussed with the " patient who was provided the opportunity to ask questions and make suggestions regarding alternative treatment. Patient verbalized understanding and expressed agreement with the plan.     Sujey Dunbar M.D.  09/02/21    This note was created using voice recognition software (Dragon). The accuracy of the dictation is limited by the abilities of the software. I have reviewed the note prior to signing, however some errors in grammar and context are still possible. If you have any questions related to this note please do not hesitate to contact our office.

## 2021-09-16 ENCOUNTER — TELEMEDICINE (OUTPATIENT)
Dept: BEHAVIORAL HEALTH | Facility: CLINIC | Age: 41
End: 2021-09-16
Payer: COMMERCIAL

## 2021-09-16 VITALS — BODY MASS INDEX: 33.23 KG/M2 | HEIGHT: 63 IN

## 2021-09-16 DIAGNOSIS — F41.9 ANXIETY DISORDER, UNSPECIFIED TYPE: ICD-10-CM

## 2021-09-16 DIAGNOSIS — F51.04 CHRONIC INSOMNIA: ICD-10-CM

## 2021-09-16 DIAGNOSIS — F43.23 ADJUSTMENT DISORDER WITH MIXED ANXIETY AND DEPRESSED MOOD: ICD-10-CM

## 2021-09-16 DIAGNOSIS — F10.20 ALCOHOL USE DISORDER, SEVERE, DEPENDENCE (HCC): ICD-10-CM

## 2021-09-16 PROCEDURE — 90833 PSYTX W PT W E/M 30 MIN: CPT | Mod: 95 | Performed by: PSYCHIATRY & NEUROLOGY

## 2021-09-16 PROCEDURE — 99214 OFFICE O/P EST MOD 30 MIN: CPT | Mod: 95 | Performed by: PSYCHIATRY & NEUROLOGY

## 2021-09-16 RX ORDER — TRAZODONE HYDROCHLORIDE 150 MG/1
150-300 TABLET ORAL
Qty: 30 TABLET | Refills: 1 | Status: SHIPPED | OUTPATIENT
Start: 2021-09-16 | End: 2021-10-27 | Stop reason: SDUPTHER

## 2021-09-16 RX ORDER — FLUOXETINE HYDROCHLORIDE 20 MG/1
20 CAPSULE ORAL DAILY
Qty: 30 CAPSULE | Refills: 1 | Status: SHIPPED | OUTPATIENT
Start: 2021-09-16 | End: 2021-10-27 | Stop reason: DRUGHIGH

## 2021-09-16 RX ORDER — BUSPIRONE HYDROCHLORIDE 15 MG/1
15 TABLET ORAL 2 TIMES DAILY
Qty: 60 TABLET | Refills: 1 | Status: SHIPPED | OUTPATIENT
Start: 2021-09-16 | End: 2021-10-27 | Stop reason: SDUPTHER

## 2021-09-16 ASSESSMENT — PATIENT HEALTH QUESTIONNAIRE - PHQ9
5. POOR APPETITE OR OVEREATING: 3 - NEARLY EVERY DAY
SUM OF ALL RESPONSES TO PHQ QUESTIONS 1-9: 20
CLINICAL INTERPRETATION OF PHQ2 SCORE: 5

## 2021-09-16 NOTE — PROGRESS NOTES
PSYCHIATRY VIRTUAL VISIT FOLLOW-UP NOTE      Chief Complaint   Patient presents with   • Follow-Up     alcohol addiction, depression, anxiety       This evaluation was conducted via [employer] using secure and encrypted videoconferencing technology. The patient was in a private location in the [facility].    The patient's identity was confirmed and verbal consent was obtained for this virtual visit.    History Of Present Illness:  Donna Morales is a 41 y.o. female with hypertension, alcohol use disorder, anxiety disorder, chronic insomnia comes in today for follow up, was last seen 2 weeks ago.  She is doing a little bit better in regards to her mental health since her last appointment with me.  She continues to slowly cut back on her alcohol use and is down to half a bottle of vodka.  She took trip to [facility] with her mother and grandmother and had a nice time there.  She has been applying for unemployment but it has been a struggle.  She has not been able to think about an inpatient rehab as she wants to get on unemployment first.  She has been talking to her ex and that has been going well.  She is having a hard time with her appetite and is eating 1 meal a day.  She is having on and off struggles with sleep as well.  She denies having thoughts of wanting to hurt herself.    Social History:   She and her boyfriend are taking a break from the relationship, lives alone in [place], good support from mother and grandmother and both live in town, unemployed.    Substance Use:  Alcohol - She has cut down on her alcohol use and is drinking half bottle of vodka daily now  Nicotine - Denies  Cannabis - Denies  Illicit drugs - Denies    Past Medication Trials:  Ativan, Topamax for alcohol use disorder (ineffective), Antabuse 500 mg    Medications:  Current Outpatient Medications   Medication Sig Dispense Refill   • busPIRone (BUSPAR) 15 MG tablet Take 1 Tablet by mouth 2 times a day. 60 Tablet 0   • FLUoxetine  "(PROZAC) 20 MG Cap Take 1 Capsule by mouth every day. 30 Capsule 0   • gabapentin (NEURONTIN) 300 MG Cap Take 300 mg by mouth 2 times a day. (Patient not taking: Reported on 8/24/2021)     • acamprosate (CAMPRAL) 333 MG tablet Take 666 mg by mouth 3 times a day. (Patient not taking: Reported on 8/24/2021)     • ferrous sulfate 325 (65 Fe) MG EC tablet Take 1 tablet by mouth 3 times a day with meals. (Patient not taking: Reported on 8/24/2021) 90 tablet 0   • senna-docusate (PERICOLACE OR SENOKOT S) 8.6-50 MG Tab Take 1 tablet by mouth every day. 30 tablet 0   • naltrexone (DEPADE) 50 MG Tab Take 1 Tab by mouth every day. (Patient not taking: Reported on 8/24/2021) 90 Tab 0   • hydrOXYzine pamoate (VISTARIL) 50 MG Cap Take 1 Cap by mouth 2 times a day as needed for Anxiety. (Patient not taking: Reported on 8/24/2021) 180 Cap 0     No current facility-administered medications for this visit.       Review Of Systems:    Constitutional - Positive for fatigue, poor appetite  Psychiatric - Positive for anxiety, depression, alcohol use, poor sleep    Physical Examination:  Vital signs: Ht 1.6 m (5' 3\")   BMI 33.23 kg/m²     Musculoskeletal: No abnormal movements.     Mental Status Evaluation:   General: Young black female, dressed in casual attire, good grooming and hygiene, in no apparent distress, calm and cooperative, good eye contact, no psychomotor retardation  Orientation: Alert and oriented to person, place and time  Recent and remote memory: Grossly intact  Attention span and concentration: Grossly intact  Speech: Spontaneous, normal rate, rhythm and tone  Thought Process: Linear, logical and goal directed  Thought Content: Denies suicidal or homicidal ideations, intent or plan  Perception: No delusions noted  Associations: Intact  Language: Appropriate  Fund of knowledge and vocabulary: Grossly adequate  Mood: \"okay\"  Affect: Dysphoric, mood congruent  Insight: Good  Judgment: Good    Depression " screening:  Depression Screen (PHQ-2/PHQ-9) 11/14/2019 2/19/2020 6/2/2021   PHQ-2 Total Score - - 0   PHQ-2 Total Score 0 0 -     Interpretation of PHQ-9 Total Score   Score Severity   1-4 No Depression   5-9 Mild Depression   10-14 Moderate Depression   15-19 Moderately Severe Depression   20-27 Severe Depression    Medical Records/Labs/Diagnostic Tests Reviewed:  [place] PDMP records - no prescribed controlled medications found in the last 2 years      Impression:  1.  Adjustment disorder with mixed anxiety and depression (unemployed, relationship stress) - slight improvement   2.  Alcohol use disorder, severe - slight improvement   3.  Unspecified anxiety disorder - stable  4.  Chronic insomnia secondary to depression, anxiety, alcohol use - stable    Plan:  1.  Continue Prozac 20 mg daily for depression and anxiety  2.  Continue Buspirone 15 mg twice daily for anxiety and depression augmentation  3.  Continue Trazodone 150-300 mg at bedtime for sleep  4.  Will continue to hold off on restarting Naltrexone, Campral, Neurontin and Hydroxyzine  5.  Continue to slowly cut down on alcohol use  6.  CBC, CMP, Vitamin B1, B12 and folic acid ordered given daily heavy alcohol use  7.  Provided supportive psychotherapy (> 16 minutes).  Provided encouragement she has been slowly cutting down on her alcohol use and to continue to slowly cut down on her alcohol intake.  Discussed focusing on encouraged her to do something for herself on a regular basis when going out for a cup of coffee, taking a walk etc.    Return to clinic in 4 weeks or sooner if symptoms worsen    The proposed treatment plan was discussed with the patient who was provided the opportunity to ask questions and make suggestions regarding alternative treatment. Patient verbalized understanding and expressed agreement with the plan.     Sujey Dunbar M.D.  09/16/21    This note was created using voice recognition software (Dragon). The accuracy of the dictation  is limited by the abilities of the software. I have reviewed the note prior to signing, however some errors in grammar and context are still possible. If you have any questions related to this note please do not hesitate to contact our office.

## 2021-10-18 ENCOUNTER — HOSPITAL ENCOUNTER (OUTPATIENT)
Dept: LAB | Facility: MEDICAL CENTER | Age: 41
End: 2021-10-18
Attending: PSYCHIATRY & NEUROLOGY
Payer: COMMERCIAL

## 2021-10-18 DIAGNOSIS — F10.20 ALCOHOL USE DISORDER, SEVERE, DEPENDENCE (HCC): ICD-10-CM

## 2021-10-18 LAB
ALBUMIN SERPL BCP-MCNC: 4.7 G/DL (ref 3.2–4.9)
ALBUMIN/GLOB SERPL: 1.4 G/DL
ALP SERPL-CCNC: 106 U/L (ref 30–99)
ALT SERPL-CCNC: 56 U/L (ref 2–50)
ANION GAP SERPL CALC-SCNC: 20 MMOL/L (ref 7–16)
AST SERPL-CCNC: 221 U/L (ref 12–45)
BILIRUB SERPL-MCNC: 1.6 MG/DL (ref 0.1–1.5)
BUN SERPL-MCNC: 13 MG/DL (ref 8–22)
CALCIUM SERPL-MCNC: 9.6 MG/DL (ref 8.5–10.5)
CHLORIDE SERPL-SCNC: 99 MMOL/L (ref 96–112)
CO2 SERPL-SCNC: 19 MMOL/L (ref 20–33)
CREAT SERPL-MCNC: 1.11 MG/DL (ref 0.5–1.4)
ERYTHROCYTE [DISTWIDTH] IN BLOOD BY AUTOMATED COUNT: 80.9 FL (ref 35.9–50)
FOLATE SERPL-MCNC: 5.7 NG/ML
GLOBULIN SER CALC-MCNC: 3.3 G/DL (ref 1.9–3.5)
GLUCOSE SERPL-MCNC: 128 MG/DL (ref 65–99)
HCT VFR BLD AUTO: 32.6 % (ref 37–47)
HGB BLD-MCNC: 10.5 G/DL (ref 12–16)
MCH RBC QN AUTO: 27.6 PG (ref 27–33)
MCHC RBC AUTO-ENTMCNC: 32.2 G/DL (ref 33.6–35)
MCV RBC AUTO: 85.6 FL (ref 81.4–97.8)
MORPHOLOGY BLD-IMP: NORMAL
PLATELET # BLD AUTO: 127 K/UL (ref 164–446)
POTASSIUM SERPL-SCNC: 2.7 MMOL/L (ref 3.6–5.5)
PROT SERPL-MCNC: 8 G/DL (ref 6–8.2)
RBC # BLD AUTO: 3.81 M/UL (ref 4.2–5.4)
SODIUM SERPL-SCNC: 138 MMOL/L (ref 135–145)
VIT B12 SERPL-MCNC: 1003 PG/ML (ref 211–911)
WBC # BLD AUTO: 4.3 K/UL (ref 4.8–10.8)

## 2021-10-18 PROCEDURE — 84425 ASSAY OF VITAMIN B-1: CPT

## 2021-10-18 PROCEDURE — 85027 COMPLETE CBC AUTOMATED: CPT

## 2021-10-18 PROCEDURE — 36415 COLL VENOUS BLD VENIPUNCTURE: CPT

## 2021-10-18 PROCEDURE — 82607 VITAMIN B-12: CPT

## 2021-10-18 PROCEDURE — 80053 COMPREHEN METABOLIC PANEL: CPT

## 2021-10-18 PROCEDURE — 82746 ASSAY OF FOLIC ACID SERUM: CPT

## 2021-10-19 ENCOUNTER — TELEPHONE (OUTPATIENT)
Dept: BEHAVIORAL HEALTH | Facility: CLINIC | Age: 41
End: 2021-10-19

## 2021-10-19 NOTE — TELEPHONE ENCOUNTER
10/19 @ 7:55 AM - Reviewed patient's labs, potassium level low at 2.7.  I attempted to call the patient several times this morning but she was unreachable.  I also called her mother who is her emergency contact and involved in her care but unable to reach her as well, no voicemail set up to leave message.  I did send patient a [employer] message asking her to go to the ER for potassium replacement.  Will attempt to call her later during the day to follow-up.    10/19 @ 9:25 AM - Attempted to call both patient and mother to discuss labs, no answer, unable to leave voicemail.

## 2021-10-20 ENCOUNTER — HOSPITAL ENCOUNTER (OUTPATIENT)
Dept: LAB | Facility: MEDICAL CENTER | Age: 41
End: 2021-10-20
Attending: PSYCHIATRY & NEUROLOGY
Payer: COMMERCIAL

## 2021-10-20 DIAGNOSIS — E87.6 HYPOKALEMIA: ICD-10-CM

## 2021-10-20 DIAGNOSIS — F10.20 ALCOHOL USE DISORDER, SEVERE, DEPENDENCE (HCC): ICD-10-CM

## 2021-10-20 LAB
ANION GAP SERPL CALC-SCNC: 19 MMOL/L (ref 7–16)
BUN SERPL-MCNC: 18 MG/DL (ref 8–22)
CALCIUM SERPL-MCNC: 9.4 MG/DL (ref 8.5–10.5)
CHLORIDE SERPL-SCNC: 99 MMOL/L (ref 96–112)
CO2 SERPL-SCNC: 19 MMOL/L (ref 20–33)
CREAT SERPL-MCNC: 1.47 MG/DL (ref 0.5–1.4)
GLUCOSE SERPL-MCNC: 160 MG/DL (ref 65–99)
POTASSIUM SERPL-SCNC: 2.5 MMOL/L (ref 3.6–5.5)
SODIUM SERPL-SCNC: 137 MMOL/L (ref 135–145)

## 2021-10-20 PROCEDURE — 80048 BASIC METABOLIC PNL TOTAL CA: CPT

## 2021-10-20 PROCEDURE — 36415 COLL VENOUS BLD VENIPUNCTURE: CPT

## 2021-10-20 NOTE — PROGRESS NOTES
I was able to talk to the patient as well as her mother today in regards to hypokalemia on her most recent labs.  She is struggling with sore throat and nausea but denies any other physical health symptoms.  Her mother mentioned that she has cut down on her drinking to half a bottle every day.  Discussed with the patient about getting another lab today to follow-up on her potassium and to consider going to the ER for potassium replacement.  She has an appointment with me tomorrow and will follow-up on BMP that she will get done today

## 2021-10-21 ENCOUNTER — TELEPHONE (OUTPATIENT)
Dept: BEHAVIORAL HEALTH | Facility: CLINIC | Age: 41
End: 2021-10-21

## 2021-10-21 ENCOUNTER — HOSPITAL ENCOUNTER (INPATIENT)
Facility: MEDICAL CENTER | Age: 41
LOS: 4 days | DRG: 641 | End: 2021-10-25
Attending: EMERGENCY MEDICINE | Admitting: INTERNAL MEDICINE
Payer: COMMERCIAL

## 2021-10-21 ENCOUNTER — APPOINTMENT (OUTPATIENT)
Dept: BEHAVIORAL HEALTH | Facility: CLINIC | Age: 41
End: 2021-10-21
Payer: COMMERCIAL

## 2021-10-21 DIAGNOSIS — E87.6 HYPOKALEMIA: ICD-10-CM

## 2021-10-21 DIAGNOSIS — E86.0 DEHYDRATION: ICD-10-CM

## 2021-10-21 PROBLEM — R11.2 NAUSEA & VOMITING: Status: ACTIVE | Noted: 2021-10-21

## 2021-10-21 PROBLEM — R19.7 DIARRHEA: Status: ACTIVE | Noted: 2021-10-21

## 2021-10-21 LAB
ALBUMIN SERPL BCP-MCNC: 4.2 G/DL (ref 3.2–4.9)
ALBUMIN/GLOB SERPL: 1.2 G/DL
ALP SERPL-CCNC: 104 U/L (ref 30–99)
ALT SERPL-CCNC: 50 U/L (ref 2–50)
ANION GAP SERPL CALC-SCNC: 18 MMOL/L (ref 7–16)
ANISOCYTOSIS BLD QL SMEAR: ABNORMAL
APPEARANCE UR: CLEAR
AST SERPL-CCNC: 184 U/L (ref 12–45)
BACTERIA #/AREA URNS HPF: ABNORMAL /HPF
BASOPHILS # BLD AUTO: 2.7 % (ref 0–1.8)
BASOPHILS # BLD: 0.16 K/UL (ref 0–0.12)
BILIRUB SERPL-MCNC: 0.9 MG/DL (ref 0.1–1.5)
BILIRUB UR QL STRIP.AUTO: NEGATIVE
BUN SERPL-MCNC: 17 MG/DL (ref 8–22)
CALCIUM SERPL-MCNC: 9.2 MG/DL (ref 8.5–10.5)
CHLORIDE SERPL-SCNC: 100 MMOL/L (ref 96–112)
CO2 SERPL-SCNC: 19 MMOL/L (ref 20–33)
COLOR UR: YELLOW
CREAT SERPL-MCNC: 1.25 MG/DL (ref 0.5–1.4)
EKG IMPRESSION: NORMAL
EOSINOPHIL # BLD AUTO: 0 K/UL (ref 0–0.51)
EOSINOPHIL NFR BLD: 0 % (ref 0–6.9)
EPI CELLS #/AREA URNS HPF: ABNORMAL /HPF
ERYTHROCYTE [DISTWIDTH] IN BLOOD BY AUTOMATED COUNT: 77.3 FL (ref 35.9–50)
ERYTHROCYTE [DISTWIDTH] IN BLOOD BY AUTOMATED COUNT: 78.9 FL (ref 35.9–50)
FERRITIN SERPL-MCNC: 361 NG/ML (ref 10–291)
GIANT PLATELETS BLD QL SMEAR: NORMAL
GLOBULIN SER CALC-MCNC: 3.4 G/DL (ref 1.9–3.5)
GLUCOSE SERPL-MCNC: 130 MG/DL (ref 65–99)
GLUCOSE UR STRIP.AUTO-MCNC: NEGATIVE MG/DL
HCT VFR BLD AUTO: 29.4 % (ref 37–47)
HCT VFR BLD AUTO: 30.6 % (ref 37–47)
HGB BLD-MCNC: 9.8 G/DL (ref 12–16)
HGB BLD-MCNC: 9.9 G/DL (ref 12–16)
HGB RETIC QN AUTO: 32 PG/CELL (ref 29–35)
HYALINE CASTS #/AREA URNS LPF: ABNORMAL /LPF
HYPOCHROMIA BLD QL SMEAR: ABNORMAL
IMM RETICS NFR: 30.2 % (ref 9.3–17.4)
IRON SATN MFR SERPL: 33 % (ref 15–55)
IRON SERPL-MCNC: 85 UG/DL (ref 40–170)
KETONES UR STRIP.AUTO-MCNC: ABNORMAL MG/DL
LEUKOCYTE ESTERASE UR QL STRIP.AUTO: NEGATIVE
LIPASE SERPL-CCNC: 7 U/L (ref 11–82)
LYMPHOCYTES # BLD AUTO: 1.41 K/UL (ref 1–4.8)
LYMPHOCYTES NFR BLD: 23.9 % (ref 22–41)
MACROCYTES BLD QL SMEAR: ABNORMAL
MAGNESIUM SERPL-MCNC: 1.4 MG/DL (ref 1.5–2.5)
MANUAL DIFF BLD: NORMAL
MCH RBC QN AUTO: 27.6 PG (ref 27–33)
MCH RBC QN AUTO: 28.2 PG (ref 27–33)
MCHC RBC AUTO-ENTMCNC: 32.4 G/DL (ref 33.6–35)
MCHC RBC AUTO-ENTMCNC: 33.3 G/DL (ref 33.6–35)
MCV RBC AUTO: 84.7 FL (ref 81.4–97.8)
MCV RBC AUTO: 85.2 FL (ref 81.4–97.8)
MICRO URNS: ABNORMAL
MONOCYTES # BLD AUTO: 0.92 K/UL (ref 0–0.85)
MONOCYTES NFR BLD AUTO: 15.6 % (ref 0–13.4)
MORPHOLOGY BLD-IMP: NORMAL
MYELOCYTES NFR BLD MANUAL: 2.8 %
NEUTROPHILS # BLD AUTO: 3.25 K/UL (ref 2–7.15)
NEUTROPHILS NFR BLD: 54.1 % (ref 44–72)
NEUTS BAND NFR BLD MANUAL: 0.9 % (ref 0–10)
NITRITE UR QL STRIP.AUTO: NEGATIVE
NRBC # BLD AUTO: 0.08 K/UL
NRBC BLD-RTO: 1.3 /100 WBC
PH UR STRIP.AUTO: 6.5 [PH] (ref 5–8)
PLATELET # BLD AUTO: 143 K/UL (ref 164–446)
PLATELET # BLD AUTO: 152 K/UL (ref 164–446)
PLATELET BLD QL SMEAR: NORMAL
PMV BLD AUTO: 10 FL (ref 9–12.9)
PMV BLD AUTO: 9.9 FL (ref 9–12.9)
POLYCHROMASIA BLD QL SMEAR: NORMAL
POTASSIUM SERPL-SCNC: 2.3 MMOL/L (ref 3.6–5.5)
POTASSIUM SERPL-SCNC: 2.3 MMOL/L (ref 3.6–5.5)
POTASSIUM SERPL-SCNC: 2.6 MMOL/L (ref 3.6–5.5)
POTASSIUM UR-SCNC: 6 MMOL/L
PROT SERPL-MCNC: 7.6 G/DL (ref 6–8.2)
PROT UR QL STRIP: 100 MG/DL
RBC # BLD AUTO: 3.47 M/UL (ref 4.2–5.4)
RBC # BLD AUTO: 3.59 M/UL (ref 4.2–5.4)
RBC # URNS HPF: ABNORMAL /HPF
RBC BLD AUTO: PRESENT
RBC UR QL AUTO: NEGATIVE
RETICS # AUTO: 0.08 M/UL (ref 0.04–0.06)
RETICS/RBC NFR: 2.1 % (ref 0.8–2.1)
SODIUM SERPL-SCNC: 137 MMOL/L (ref 135–145)
SP GR UR STRIP.AUTO: 1.02
T4 FREE SERPL-MCNC: 0.94 NG/DL (ref 0.93–1.7)
TIBC SERPL-MCNC: 259 UG/DL (ref 250–450)
UIBC SERPL-MCNC: 174 UG/DL (ref 110–370)
UROBILINOGEN UR STRIP.AUTO-MCNC: 0.2 MG/DL
WBC # BLD AUTO: 5.9 K/UL (ref 4.8–10.8)
WBC # BLD AUTO: 5.9 K/UL (ref 4.8–10.8)
WBC #/AREA URNS HPF: ABNORMAL /HPF

## 2021-10-21 PROCEDURE — 700101 HCHG RX REV CODE 250: Performed by: EMERGENCY MEDICINE

## 2021-10-21 PROCEDURE — 85007 BL SMEAR W/DIFF WBC COUNT: CPT

## 2021-10-21 PROCEDURE — 96367 TX/PROPH/DG ADDL SEQ IV INF: CPT

## 2021-10-21 PROCEDURE — 700101 HCHG RX REV CODE 250: Performed by: STUDENT IN AN ORGANIZED HEALTH CARE EDUCATION/TRAINING PROGRAM

## 2021-10-21 PROCEDURE — 85046 RETICYTE/HGB CONCENTRATE: CPT

## 2021-10-21 PROCEDURE — 700111 HCHG RX REV CODE 636 W/ 250 OVERRIDE (IP): Performed by: STUDENT IN AN ORGANIZED HEALTH CARE EDUCATION/TRAINING PROGRAM

## 2021-10-21 PROCEDURE — 84133 ASSAY OF URINE POTASSIUM: CPT

## 2021-10-21 PROCEDURE — 770020 HCHG ROOM/CARE - TELE (206)

## 2021-10-21 PROCEDURE — 96375 TX/PRO/DX INJ NEW DRUG ADDON: CPT

## 2021-10-21 PROCEDURE — 99285 EMERGENCY DEPT VISIT HI MDM: CPT

## 2021-10-21 PROCEDURE — 700102 HCHG RX REV CODE 250 W/ 637 OVERRIDE(OP): Performed by: STUDENT IN AN ORGANIZED HEALTH CARE EDUCATION/TRAINING PROGRAM

## 2021-10-21 PROCEDURE — 96368 THER/DIAG CONCURRENT INF: CPT

## 2021-10-21 PROCEDURE — 84132 ASSAY OF SERUM POTASSIUM: CPT

## 2021-10-21 PROCEDURE — A9270 NON-COVERED ITEM OR SERVICE: HCPCS | Performed by: STUDENT IN AN ORGANIZED HEALTH CARE EDUCATION/TRAINING PROGRAM

## 2021-10-21 PROCEDURE — 83550 IRON BINDING TEST: CPT

## 2021-10-21 PROCEDURE — 83735 ASSAY OF MAGNESIUM: CPT

## 2021-10-21 PROCEDURE — 96365 THER/PROPH/DIAG IV INF INIT: CPT

## 2021-10-21 PROCEDURE — 93005 ELECTROCARDIOGRAM TRACING: CPT | Performed by: EMERGENCY MEDICINE

## 2021-10-21 PROCEDURE — A9270 NON-COVERED ITEM OR SERVICE: HCPCS | Performed by: INTERNAL MEDICINE

## 2021-10-21 PROCEDURE — 83540 ASSAY OF IRON: CPT

## 2021-10-21 PROCEDURE — 85027 COMPLETE CBC AUTOMATED: CPT

## 2021-10-21 PROCEDURE — 84439 ASSAY OF FREE THYROXINE: CPT

## 2021-10-21 PROCEDURE — 80053 COMPREHEN METABOLIC PANEL: CPT

## 2021-10-21 PROCEDURE — 700102 HCHG RX REV CODE 250 W/ 637 OVERRIDE(OP): Performed by: INTERNAL MEDICINE

## 2021-10-21 PROCEDURE — 99223 1ST HOSP IP/OBS HIGH 75: CPT | Mod: GC | Performed by: INTERNAL MEDICINE

## 2021-10-21 PROCEDURE — 81001 URINALYSIS AUTO W/SCOPE: CPT

## 2021-10-21 PROCEDURE — 36415 COLL VENOUS BLD VENIPUNCTURE: CPT

## 2021-10-21 PROCEDURE — 82728 ASSAY OF FERRITIN: CPT

## 2021-10-21 PROCEDURE — 700111 HCHG RX REV CODE 636 W/ 250 OVERRIDE (IP): Performed by: EMERGENCY MEDICINE

## 2021-10-21 PROCEDURE — 93005 ELECTROCARDIOGRAM TRACING: CPT

## 2021-10-21 PROCEDURE — 83690 ASSAY OF LIPASE: CPT

## 2021-10-21 RX ORDER — TRAZODONE HYDROCHLORIDE 100 MG/1
300 TABLET ORAL
Status: DISCONTINUED | OUTPATIENT
Start: 2021-10-21 | End: 2021-10-25 | Stop reason: HOSPADM

## 2021-10-21 RX ORDER — OMEPRAZOLE 20 MG/1
20 CAPSULE, DELAYED RELEASE ORAL DAILY
Status: DISCONTINUED | OUTPATIENT
Start: 2021-10-22 | End: 2021-10-25 | Stop reason: HOSPADM

## 2021-10-21 RX ORDER — LORAZEPAM 1 MG/1
1 TABLET ORAL EVERY 4 HOURS PRN
Status: DISCONTINUED | OUTPATIENT
Start: 2021-10-21 | End: 2021-10-25 | Stop reason: HOSPADM

## 2021-10-21 RX ORDER — LORAZEPAM 2 MG/ML
2 INJECTION INTRAMUSCULAR
Status: DISCONTINUED | OUTPATIENT
Start: 2021-10-21 | End: 2021-10-25 | Stop reason: HOSPADM

## 2021-10-21 RX ORDER — ACETAMINOPHEN 325 MG/1
650 TABLET ORAL EVERY 6 HOURS PRN
Status: DISCONTINUED | OUTPATIENT
Start: 2021-10-21 | End: 2021-10-25 | Stop reason: HOSPADM

## 2021-10-21 RX ORDER — LORAZEPAM 2 MG/ML
1 INJECTION INTRAMUSCULAR
Status: DISCONTINUED | OUTPATIENT
Start: 2021-10-21 | End: 2021-10-25 | Stop reason: HOSPADM

## 2021-10-21 RX ORDER — SODIUM CHLORIDE AND POTASSIUM CHLORIDE 300; 900 MG/100ML; MG/100ML
INJECTION, SOLUTION INTRAVENOUS CONTINUOUS
Status: DISCONTINUED | OUTPATIENT
Start: 2021-10-21 | End: 2021-10-22

## 2021-10-21 RX ORDER — LORAZEPAM 2 MG/1
2 TABLET ORAL
Status: DISCONTINUED | OUTPATIENT
Start: 2021-10-21 | End: 2021-10-25 | Stop reason: HOSPADM

## 2021-10-21 RX ORDER — POTASSIUM CHLORIDE 20 MEQ/1
60 TABLET, EXTENDED RELEASE ORAL ONCE
Status: COMPLETED | OUTPATIENT
Start: 2021-10-21 | End: 2021-10-21

## 2021-10-21 RX ORDER — PROMETHAZINE HYDROCHLORIDE 25 MG/1
12.5-25 SUPPOSITORY RECTAL EVERY 4 HOURS PRN
Status: DISCONTINUED | OUTPATIENT
Start: 2021-10-21 | End: 2021-10-25 | Stop reason: HOSPADM

## 2021-10-21 RX ORDER — LORAZEPAM 0.5 MG/1
0.5 TABLET ORAL EVERY 4 HOURS PRN
Status: DISCONTINUED | OUTPATIENT
Start: 2021-10-21 | End: 2021-10-25 | Stop reason: HOSPADM

## 2021-10-21 RX ORDER — LORAZEPAM 2 MG/1
4 TABLET ORAL
Status: DISCONTINUED | OUTPATIENT
Start: 2021-10-21 | End: 2021-10-25 | Stop reason: HOSPADM

## 2021-10-21 RX ORDER — GABAPENTIN 300 MG/1
300 CAPSULE ORAL 3 TIMES DAILY
Status: COMPLETED | OUTPATIENT
Start: 2021-10-21 | End: 2021-10-24

## 2021-10-21 RX ORDER — BUSPIRONE HYDROCHLORIDE 10 MG/1
15 TABLET ORAL 2 TIMES DAILY
Status: DISCONTINUED | OUTPATIENT
Start: 2021-10-21 | End: 2021-10-25 | Stop reason: HOSPADM

## 2021-10-21 RX ORDER — OXYCODONE HYDROCHLORIDE 5 MG/1
5 TABLET ORAL EVERY 6 HOURS PRN
Status: DISCONTINUED | OUTPATIENT
Start: 2021-10-21 | End: 2021-10-25 | Stop reason: HOSPADM

## 2021-10-21 RX ORDER — PROMETHAZINE HYDROCHLORIDE 25 MG/1
12.5-25 TABLET ORAL EVERY 4 HOURS PRN
Status: DISCONTINUED | OUTPATIENT
Start: 2021-10-21 | End: 2021-10-25 | Stop reason: HOSPADM

## 2021-10-21 RX ORDER — MAGNESIUM SULFATE HEPTAHYDRATE 40 MG/ML
4 INJECTION, SOLUTION INTRAVENOUS ONCE
Status: COMPLETED | OUTPATIENT
Start: 2021-10-21 | End: 2021-10-21

## 2021-10-21 RX ORDER — ONDANSETRON 4 MG/1
4 TABLET, ORALLY DISINTEGRATING ORAL EVERY 4 HOURS PRN
Status: DISCONTINUED | OUTPATIENT
Start: 2021-10-21 | End: 2021-10-21

## 2021-10-21 RX ORDER — FLUOXETINE HYDROCHLORIDE 20 MG/1
20 CAPSULE ORAL DAILY
Status: DISCONTINUED | OUTPATIENT
Start: 2021-10-22 | End: 2021-10-25 | Stop reason: HOSPADM

## 2021-10-21 RX ORDER — FOLIC ACID 1 MG/1
1 TABLET ORAL DAILY
Status: COMPLETED | OUTPATIENT
Start: 2021-10-22 | End: 2021-10-25

## 2021-10-21 RX ORDER — TRAZODONE HYDROCHLORIDE 100 MG/1
100-300 TABLET ORAL NIGHTLY
Status: ON HOLD | COMMUNITY
End: 2021-10-25

## 2021-10-21 RX ORDER — LORAZEPAM 2 MG/ML
1.5 INJECTION INTRAMUSCULAR
Status: DISCONTINUED | OUTPATIENT
Start: 2021-10-21 | End: 2021-10-25 | Stop reason: HOSPADM

## 2021-10-21 RX ORDER — POTASSIUM CHLORIDE 7.45 MG/ML
10 INJECTION INTRAVENOUS ONCE
Status: COMPLETED | OUTPATIENT
Start: 2021-10-21 | End: 2021-10-21

## 2021-10-21 RX ORDER — GAUZE BANDAGE 2" X 2"
100 BANDAGE TOPICAL DAILY
Status: COMPLETED | OUTPATIENT
Start: 2021-10-22 | End: 2021-10-25

## 2021-10-21 RX ORDER — LORAZEPAM 2 MG/ML
0.5 INJECTION INTRAMUSCULAR EVERY 4 HOURS PRN
Status: DISCONTINUED | OUTPATIENT
Start: 2021-10-21 | End: 2021-10-25 | Stop reason: HOSPADM

## 2021-10-21 RX ORDER — ONDANSETRON 4 MG/1
4 TABLET, ORALLY DISINTEGRATING ORAL EVERY 4 HOURS
Status: DISCONTINUED | OUTPATIENT
Start: 2021-10-21 | End: 2021-10-25 | Stop reason: HOSPADM

## 2021-10-21 RX ORDER — CHOLECALCIFEROL (VITAMIN D3) 125 MCG
5 CAPSULE ORAL NIGHTLY
Status: DISCONTINUED | OUTPATIENT
Start: 2021-10-21 | End: 2021-10-25 | Stop reason: HOSPADM

## 2021-10-21 RX ORDER — PHENOL 1.4 %
20 AEROSOL, SPRAY (ML) MUCOUS MEMBRANE
COMMUNITY

## 2021-10-21 RX ORDER — ONDANSETRON 2 MG/ML
4 INJECTION INTRAMUSCULAR; INTRAVENOUS EVERY 4 HOURS PRN
Status: DISCONTINUED | OUTPATIENT
Start: 2021-10-21 | End: 2021-10-25 | Stop reason: HOSPADM

## 2021-10-21 RX ORDER — PANTOPRAZOLE SODIUM 40 MG/10ML
40 INJECTION, POWDER, LYOPHILIZED, FOR SOLUTION INTRAVENOUS DAILY
Status: DISCONTINUED | OUTPATIENT
Start: 2021-10-21 | End: 2021-10-21

## 2021-10-21 RX ORDER — PROCHLORPERAZINE EDISYLATE 5 MG/ML
5-10 INJECTION INTRAMUSCULAR; INTRAVENOUS EVERY 4 HOURS PRN
Status: DISCONTINUED | OUTPATIENT
Start: 2021-10-21 | End: 2021-10-25 | Stop reason: HOSPADM

## 2021-10-21 RX ADMIN — Medication 400 MG: at 18:45

## 2021-10-21 RX ADMIN — MAGNESIUM SULFATE HEPTAHYDRATE 4 G: 40 INJECTION, SOLUTION INTRAVENOUS at 16:06

## 2021-10-21 RX ADMIN — ONDANSETRON 4 MG: 4 TABLET, ORALLY DISINTEGRATING ORAL at 22:10

## 2021-10-21 RX ADMIN — THIAMINE HYDROCHLORIDE: 100 INJECTION, SOLUTION INTRAMUSCULAR; INTRAVENOUS at 16:07

## 2021-10-21 RX ADMIN — LORAZEPAM 1 MG: 1 TABLET ORAL at 16:53

## 2021-10-21 RX ADMIN — Medication 5 MG: at 21:06

## 2021-10-21 RX ADMIN — POTASSIUM BICARBONATE 50 MEQ: 978 TABLET, EFFERVESCENT ORAL at 23:52

## 2021-10-21 RX ADMIN — BUSPIRONE HYDROCHLORIDE 15 MG: 10 TABLET ORAL at 18:45

## 2021-10-21 RX ADMIN — TRAZODONE HYDROCHLORIDE 300 MG: 100 TABLET ORAL at 21:06

## 2021-10-21 RX ADMIN — POTASSIUM CHLORIDE 60 MEQ: 1500 TABLET, EXTENDED RELEASE ORAL at 15:37

## 2021-10-21 RX ADMIN — LORAZEPAM 2 MG: 2 TABLET ORAL at 19:26

## 2021-10-21 RX ADMIN — POTASSIUM BICARBONATE 50 MEQ: 978 TABLET, EFFERVESCENT ORAL at 19:59

## 2021-10-21 RX ADMIN — ONDANSETRON 4 MG: 2 INJECTION INTRAMUSCULAR; INTRAVENOUS at 16:52

## 2021-10-21 RX ADMIN — POTASSIUM CHLORIDE 10 MEQ: 10 INJECTION, SOLUTION INTRAVENOUS at 13:53

## 2021-10-21 RX ADMIN — GABAPENTIN 300 MG: 300 CAPSULE ORAL at 18:45

## 2021-10-21 RX ADMIN — POTASSIUM BICARBONATE 50 MEQ: 978 TABLET, EFFERVESCENT ORAL at 22:10

## 2021-10-21 RX ADMIN — POTASSIUM CHLORIDE AND SODIUM CHLORIDE: 900; 300 INJECTION, SOLUTION INTRAVENOUS at 20:15

## 2021-10-21 RX ADMIN — ONDANSETRON 4 MG: 4 TABLET, ORALLY DISINTEGRATING ORAL at 19:59

## 2021-10-21 ASSESSMENT — COGNITIVE AND FUNCTIONAL STATUS - GENERAL
CLIMB 3 TO 5 STEPS WITH RAILING: A LITTLE
SUGGESTED CMS G CODE MODIFIER MOBILITY: CI
DAILY ACTIVITIY SCORE: 24
SUGGESTED CMS G CODE MODIFIER DAILY ACTIVITY: CH
MOBILITY SCORE: 23

## 2021-10-21 ASSESSMENT — ENCOUNTER SYMPTOMS
ABDOMINAL PAIN: 0
NECK PAIN: 0
WEAKNESS: 0
CHILLS: 0
HEARTBURN: 0
NAUSEA: 1
SPUTUM PRODUCTION: 0
WEIGHT LOSS: 0
TREMORS: 0
MYALGIAS: 0
DIZZINESS: 0
DEPRESSION: 0
SORE THROAT: 0
SEIZURES: 0
NERVOUS/ANXIOUS: 0
FEVER: 0
COUGH: 0
EYE REDNESS: 0
BLURRED VISION: 0
EYE PAIN: 0
SHORTNESS OF BREATH: 0
FOCAL WEAKNESS: 0
HEMOPTYSIS: 0
DIARRHEA: 1
PALPITATIONS: 0
VOMITING: 1
BACK PAIN: 0
HEADACHES: 0
WHEEZING: 0

## 2021-10-21 ASSESSMENT — LIFESTYLE VARIABLES
HAVE PEOPLE ANNOYED YOU BY CRITICIZING YOUR DRINKING: YES
ANXIETY: NO ANXIETY (AT EASE)
VISUAL DISTURBANCES: NOT PRESENT
AGITATION: NORMAL ACTIVITY
VISUAL DISTURBANCES: NOT PRESENT
TOTAL SCORE: 2
ORIENTATION AND CLOUDING OF SENSORIUM: ORIENTED AND CAN DO SERIAL ADDITIONS
TOTAL SCORE: 4
AUDITORY DISTURBANCES: NOT PRESENT
AGITATION: *
HAVE YOU EVER FELT YOU SHOULD CUT DOWN ON YOUR DRINKING: YES
HEADACHE, FULLNESS IN HEAD: NOT PRESENT
ON A TYPICAL DAY WHEN YOU DRINK ALCOHOL HOW MANY DRINKS DO YOU HAVE: 3
TOTAL SCORE: 11
TREMOR: TREMOR NOT VISIBLE BUT CAN BE FELT, FINGERTIP TO FINGERTIP
PAROXYSMAL SWEATS: NO SWEAT VISIBLE
HEADACHE, FULLNESS IN HEAD: NOT PRESENT
HAVE PEOPLE ANNOYED YOU BY CRITICIZING YOUR DRINKING: YES
TOTAL SCORE: 4
ORIENTATION AND CLOUDING OF SENSORIUM: ORIENTED AND CAN DO SERIAL ADDITIONS
HOW MANY TIMES IN THE PAST YEAR HAVE YOU HAD 5 OR MORE DRINKS IN A DAY: 5
TOTAL SCORE: 4
ORIENTATION AND CLOUDING OF SENSORIUM: ORIENTED AND CAN DO SERIAL ADDITIONS
TREMOR: TREMOR NOT VISIBLE BUT CAN BE FELT, FINGERTIP TO FINGERTIP
TOTAL SCORE: 4
EVER FELT BAD OR GUILTY ABOUT YOUR DRINKING: YES
HEADACHE, FULLNESS IN HEAD: NOT PRESENT
ANXIETY: NO ANXIETY (AT EASE)
NAUSEA AND VOMITING: INTERMITTENT NAUSEA WITH DRY HEAVES
AUDITORY DISTURBANCES: NOT PRESENT
CONSUMPTION TOTAL: INCOMPLETE
AGITATION: NORMAL ACTIVITY
AUDITORY DISTURBANCES: NOT PRESENT
EVER FELT BAD OR GUILTY ABOUT YOUR DRINKING: YES
TREMOR: *
EVER HAD A DRINK FIRST THING IN THE MORNING TO STEADY YOUR NERVES TO GET RID OF A HANGOVER: YES
ANXIETY: MODERATELY ANXIOUS OR GUARDED, SO ANXIETY IS INFERRED
EVER HAD A DRINK FIRST THING IN THE MORNING TO STEADY YOUR NERVES TO GET RID OF A HANGOVER: YES
CONSUMPTION TOTAL: POSITIVE
ALCOHOL_USE: YES
PAROXYSMAL SWEATS: NO SWEAT VISIBLE
AVERAGE NUMBER OF DAYS PER WEEK YOU HAVE A DRINK CONTAINING ALCOHOL: 7
TOTAL SCORE: 6
VISUAL DISTURBANCES: NOT PRESENT
NAUSEA AND VOMITING: INTERMITTENT NAUSEA WITH DRY HEAVES
HAVE YOU EVER FELT YOU SHOULD CUT DOWN ON YOUR DRINKING: YES
DOES PATIENT WANT TO STOP DRINKING: NO
NAUSEA AND VOMITING: MILD NAUSEA WITH NO VOMITING
DO YOU DRINK ALCOHOL: YES
TOTAL SCORE: 4
TOTAL SCORE: 4
PAROXYSMAL SWEATS: NO SWEAT VISIBLE

## 2021-10-21 ASSESSMENT — FIBROSIS 4 INDEX
FIB4 SCORE: 9.77
FIB4 SCORE: 7.19

## 2021-10-21 ASSESSMENT — PATIENT HEALTH QUESTIONNAIRE - PHQ9
SUM OF ALL RESPONSES TO PHQ QUESTIONS 1-9: 8
5. POOR APPETITE OR OVEREATING: SEVERAL DAYS
7. TROUBLE CONCENTRATING ON THINGS, SUCH AS READING THE NEWSPAPER OR WATCHING TELEVISION: SEVERAL DAYS
6. FEELING BAD ABOUT YOURSELF - OR THAT YOU ARE A FAILURE OR HAVE LET YOURSELF OR YOUR FAMILY DOWN: SEVERAL DAYS
9. THOUGHTS THAT YOU WOULD BE BETTER OFF DEAD, OR OF HURTING YOURSELF: NOT AT ALL
4. FEELING TIRED OR HAVING LITTLE ENERGY: SEVERAL DAYS
3. TROUBLE FALLING OR STAYING ASLEEP OR SLEEPING TOO MUCH: SEVERAL DAYS
SUM OF ALL RESPONSES TO PHQ9 QUESTIONS 1 AND 2: 2
1. LITTLE INTEREST OR PLEASURE IN DOING THINGS: SEVERAL DAYS
2. FEELING DOWN, DEPRESSED, IRRITABLE, OR HOPELESS: SEVERAL DAYS
8. MOVING OR SPEAKING SO SLOWLY THAT OTHER PEOPLE COULD HAVE NOTICED. OR THE OPPOSITE, BEING SO FIGETY OR RESTLESS THAT YOU HAVE BEEN MOVING AROUND A LOT MORE THAN USUAL: SEVERAL DAYS

## 2021-10-21 ASSESSMENT — PAIN DESCRIPTION - PAIN TYPE: TYPE: ACUTE PAIN

## 2021-10-21 NOTE — TELEPHONE ENCOUNTER
Received call from pts laboratory saying they had critical results of low potassium levels reaching 2.5 , provider is attempting to reach out

## 2021-10-21 NOTE — TELEPHONE ENCOUNTER
10/21/21 @ 7:50 AM - Called patient at both numbers as well as her mother to discuss abnormal labs and to advise her to go to the ER for potassium replacement.  Unable to reach patient or her mother.  My chart message sent.  She has an appointment with me later today but will attempt to call her before that.    10/21/21 @ 9:15 AM - Spoke with both patient and mom about her labs and advised both of them to go to the ER and they were both agreeable.

## 2021-10-21 NOTE — ED TRIAGE NOTES
"Chief Complaint   Patient presents with   • Sent by MD     Recent blood work, concern for kidney function and 'low potassium levels'   • Abnormal Labs     Elevated creatinine and Potassium of 2.5 yesterday. Pt states she has not been eating/drinking much lately due to stress, denies any other sx such as chest pain.       Pt ambulatory to triage for above complaints, VSS on RA, GCS 15, NAD.    Hx of kidney failure in 2008 with one round of dialysis r/t 'too much tylenol'. Hx alcoholism, pt is still daily drinker, last drink 03:00 today. EKG ordered.     Denies all s/sx of covid, denies recent travel, denies fevers.    Pt returned to [facility]. Educated on triage process and to inform staff of any changes.     /73   Pulse 100   Temp 36.1 °C (96.9 °F) (Temporal)   Resp 16   Ht 1.626 m (5' 4\")   Wt 71.3 kg (157 lb 3 oz)   SpO2 100%   BMI 26.98 kg/m²      "

## 2021-10-21 NOTE — ED PROVIDER NOTES
ED Provider Note    Scribed for Samina Church M.D. by Ashtyn Springer. 10/21/2021, 12:52 PM.    Primary care provider: David Coughlin M.D.  Means of arrival: Walk in   History obtained from: Patient   History limited by: None     CHIEF COMPLAINT  Chief Complaint   Patient presents with   • Sent by MD     Recent blood work, concern for kidney function and 'low potassium levels'   • Abnormal Labs     Elevated creatinine and Potassium of 2.5 yesterday. Pt states she has not been eating/drinking much lately due to stress, denies any other sx such as chest pain.       HPI  Donna Morales is a 41 y.o. female who presents to the Emergency Department for evaluation of abnormal lab results from yesterday. Patient reports that she was seen by her Psychiatrist today, who reviewed her routine lab results from 10/18/21, and found the patient to have K 2.5 and elevated creatinine, so they recommended she present to the ED for further evaluation. In the ED, she presents with additional symptoms of mild generalized abdominal pain, loss of appetite secondary to recent increased stress, low PO fluid intake. Denies radiating pain. No alleviating factors reported. Patient reports that she has history of dialysis for one day secondary to alcohol abuse. She admits she has been recently drinking alcohol with her last drink being early this morning.     REVIEW OF SYSTEMS  Pertinent positives include mild generalized abdominal pain, loss of appetite secondary to increased stress, low PO fluid intake.   Pertinent negatives include no radiating pain.    All other systems reviewed and negative.    PAST MEDICAL HISTORY   has a past medical history of Alcoholism (HCC), Anemia, Anesthesia, Anxiety, Dialysis (2008), Heart burn, Hypertension, Kidney failure (2008), Liver failure (HCC), Other specified symptom associated with female genital organs, Pancreatitis, and Wrist pain.    SURGICAL HISTORY   has a past surgical history that  "includes lissa by laparoscopy (2006); laparoscopy (2000); and flexor tendon repair (7/18/2012).    SOCIAL HISTORY  Social History     Tobacco Use   • Smoking status: Never Smoker   • Smokeless tobacco: Never Used   Vaping Use   • Vaping Use: Never used   Substance Use Topics   • Alcohol use: Yes     Comment: 1/2 bottle of vodka daily   • Drug use: Not Currently      Social History     Substance and Sexual Activity   Drug Use Not Currently       FAMILY HISTORY  Family History   Problem Relation Age of Onset   • Alcohol abuse Father    • Drug abuse Father    • Diabetes Other    • Stroke Other    • Hypertension Other    • Alcohol abuse Paternal Grandfather    • Psychiatric Illness Paternal Grandmother         agoraphobia   • Alcohol abuse Paternal Grandmother    • Alcohol abuse Paternal Aunt    • Drug abuse Paternal Aunt    • Stroke Maternal Grandmother        CURRENT MEDICATIONS  Home Medications     Reviewed by Carmen Diaz (Pharmacy Tech) on 10/21/21 at 1415  Med List Status: Complete   Medication Last Dose Status   busPIRone (BUSPAR) 15 MG tablet 10/20/2021 Active   FLUoxetine (PROZAC) 20 MG Cap 10/20/2021 Active   Melatonin 10 MG Tab 10/20/2021 Active   traZODone (DESYREL) 100 MG Tab 10/20/2021 Active   traZODone (DESYREL) 150 MG Tab NOT YET STARTED Active              ALLERGIES  No Known Allergies    PHYSICAL EXAM  VITAL SIGNS: /78   Pulse 93   Temp 36.8 °C (98.3 °F) (Temporal)   Resp 16   Ht 1.626 m (5' 4\")   Wt 71.3 kg (157 lb 3 oz)   SpO2 100%   BMI 26.98 kg/m²   Constitutional: Alert in no apparent distress.  HENT: No signs of trauma, Bilateral external ears normal, Nose normal.   Eyes: Pupils are equal and reactive, Conjunctiva normal, Non-icteric.   Neck: Normal range of motion, No tenderness, Supple, No stridor.   Cardiovascular: Regular rate and rhythm, no murmurs.   Thorax & Lungs: Normal breath sounds, No respiratory distress, No wheezing, No chest tenderness.   Abdomen: Bowel sounds " normal, Soft, No tenderness, No masses, No peritoneal signs.  Skin: Warm, Dry, No erythema, No rash.   Musculoskeletal:  No major deformities noted.  Neurologic: Alert, moving all extremities without difficulty, no focal deficits.    LABS  Labs Reviewed   CBC WITH DIFFERENTIAL - Abnormal; Notable for the following components:       Result Value    RBC 3.47 (*)     Hemoglobin 9.8 (*)     Hematocrit 29.4 (*)     MCHC 33.3 (*)     RDW 77.3 (*)     Platelet Count 143 (*)     Monocytes 15.60 (*)     Basophils 2.70 (*)     Monos (Absolute) 0.92 (*)     Baso (Absolute) 0.16 (*)     Anisocytosis 2+ (*)     Macrocytosis 2+ (*)     All other components within normal limits   COMP METABOLIC PANEL - Abnormal; Notable for the following components:    Potassium 2.3 (*)     Co2 19 (*)     Anion Gap 18.0 (*)     Glucose 130 (*)     AST(SGOT) 184 (*)     Alkaline Phosphatase 104 (*)     All other components within normal limits   ESTIMATED GFR - Abnormal; Notable for the following components:    GFR If  57 (*)     GFR If Non  47 (*)     All other components within normal limits   MAGNESIUM - Abnormal; Notable for the following components:    Magnesium 1.4 (*)     All other components within normal limits   DIFFERENTIAL MANUAL   PERIPHERAL SMEAR REVIEW   PLATELET ESTIMATE   MORPHOLOGY   URINALYSIS   LIPASE   CBC WITHOUT DIFFERENTIAL   RETICULOCYTES COUNT   IRON/TOTAL IRON BIND   FERRITIN   FREE THYROXINE   POTASSIUM SERUM (K)   POTASSIUM SERUM (K)   POTASSIUM SERUM (K)   URINE POTASSIUM RANDOM     All labs reviewed by me.    EKG    Results for orders placed or performed during the hospital encounter of 10/21/21   EKG   Result Value Ref Range    Report       [facility] Emergency Dept.    Test Date:  2021-10-21  Pt Name:    GERARDO RAMIREZ               Department: ER  MRN:        [id number]                      Room:  Gender:     Female                       Technician: [id number]  :         1980                   Requested By:ER TRIAGE PROTOCOL  Order #:    [id number]                    Reading MD: YUNIEL ABREU    Measurements  Intervals                                Axis  Rate:       85                           P:          74  VT:         160                          QRS:        31  QRSD:       90                           T:          66  QT:         400  QTc:        476    Interpretive Statements  SINUS RHYTHM  Compared to ECG 01/21/2019 05:34:59  T-wave abnormality no longer present  Impression: No evidence of acute ischemia.  Electronically Signed On 10- 14:06:14 PDT by YUNIEL ABREU       12 Lead EKG interpreted by me to show:    COURSE & MEDICAL DECISION MAKING  Pertinent Labs & Imaging studies reviewed. (See chart for details)    Differential diagnoses include but are not limited to: Hypokalemia, Dehydration.     12:52 PM - Patient seen and examined at bedside. Patient will be treated with Detox IV 1000mL infusion and potassium chloride 10mEq. Ordered GFR, differential manual, peripheral smear review, platelet estimate, morphology, CBC with diff., CMP, Urinalysis, and EKG to evaluate her symptoms.     HYDRATION: Based on the patient's presentation of Dehydration the patient was given IV fluids. IV Hydration was used because oral hydration was not adequate alone. Upon recheck following hydration, the patient was improved.    2:17 PM - Paged Hospitalist    2:30PM  - I reevaluated the patient at bedside. The patient informs me they feel improved following administration. I discussed the patient's diagnostic study results as shown above. I informed the patient of my plan to admit today given the patient's current presentation and diagnostic study results. Patient verbalizes understanding and support with my plan for admission.     3:37 PM Dr. Penaloza (Hospitalist) has placed admission orders, but I have not spoken to him at this time.     Decision Making:  This is a 41 y.o. year  old female who presents with abnormal labs.  Patient is history of alcoholism and has had hypokalemia seen on her labs for the last few days.  It was repeated today and it is lower at 2.3.  She has some ANEL as well with a slightly elevated creatinine over her baseline.  I do think this is likely secondary to her alcoholism.  However given her significant hypokalemia I do think she requires hospitalization for potassium replacement and IV fluids.    DISPOSITION:  Patient will be hospitalized in guarded condition.    FINAL IMPRESSION  1. Hypokalemia    2. Dehydration         This dictation has been created using voice recognition software and/or scribes. The accuracy of the dictation is limited by the abilities of the software and the expertise of the scribes. I expect there may be some errors of grammar and possibly content. I made every attempt to manually correct the errors within my dictation. However, errors related to voice recognition software and/or scribes may still exist and should be interpreted within the appropriate context.     Ashtyn HARDIN (Scribe), am scribing for, and in the presence of, Samina Church M.D..    Electronically signed by: Ashtyn Springer (Scribe), 10/21/2021    ISamina M.D. personally performed the services described in this documentation, as scribed by Ashtyn Springer in my presence, and it is both accurate and complete.    C    The note accurately reflects work and decisions made by me.  Samina Church M.D.  10/21/2021  5:26 PM

## 2021-10-21 NOTE — ED NOTES
Med rec completed per pt   Allergies reviewed  No PO antibiotics in the last 30 days    Patient states that she is finishing up her Trazodone 100 mg prescription before she starts taking the Trazodone 150 mg prescription

## 2021-10-22 LAB
ALBUMIN SERPL BCP-MCNC: 3.5 G/DL (ref 3.2–4.9)
ALBUMIN/GLOB SERPL: 1.7 G/DL
ALP SERPL-CCNC: 81 U/L (ref 30–99)
ALT SERPL-CCNC: 37 U/L (ref 2–50)
ANION GAP SERPL CALC-SCNC: 10 MMOL/L (ref 7–16)
ANION GAP SERPL CALC-SCNC: 11 MMOL/L (ref 7–16)
ANION GAP SERPL CALC-SCNC: 9 MMOL/L (ref 7–16)
AST SERPL-CCNC: 164 U/L (ref 12–45)
BILIRUB SERPL-MCNC: 0.8 MG/DL (ref 0.1–1.5)
BUN SERPL-MCNC: 10 MG/DL (ref 8–22)
BUN SERPL-MCNC: 12 MG/DL (ref 8–22)
BUN SERPL-MCNC: 12 MG/DL (ref 8–22)
CALCIUM SERPL-MCNC: 7.6 MG/DL (ref 8.5–10.5)
CALCIUM SERPL-MCNC: 8.5 MG/DL (ref 8.5–10.5)
CALCIUM SERPL-MCNC: 8.5 MG/DL (ref 8.5–10.5)
CHLORIDE SERPL-SCNC: 108 MMOL/L (ref 96–112)
CHLORIDE SERPL-SCNC: 110 MMOL/L (ref 96–112)
CHLORIDE SERPL-SCNC: 111 MMOL/L (ref 96–112)
CO2 SERPL-SCNC: 19 MMOL/L (ref 20–33)
CO2 SERPL-SCNC: 19 MMOL/L (ref 20–33)
CO2 SERPL-SCNC: 21 MMOL/L (ref 20–33)
CREAT SERPL-MCNC: 1.1 MG/DL (ref 0.5–1.4)
CREAT SERPL-MCNC: 1.19 MG/DL (ref 0.5–1.4)
CREAT SERPL-MCNC: 1.22 MG/DL (ref 0.5–1.4)
EKG IMPRESSION: NORMAL
EKG IMPRESSION: NORMAL
ERYTHROCYTE [DISTWIDTH] IN BLOOD BY AUTOMATED COUNT: 77.8 FL (ref 35.9–50)
GLOBULIN SER CALC-MCNC: 2.1 G/DL (ref 1.9–3.5)
GLUCOSE SERPL-MCNC: 108 MG/DL (ref 65–99)
GLUCOSE SERPL-MCNC: 115 MG/DL (ref 65–99)
GLUCOSE SERPL-MCNC: 97 MG/DL (ref 65–99)
HCT VFR BLD AUTO: 23.7 % (ref 37–47)
HGB BLD-MCNC: 7.8 G/DL (ref 12–16)
MAGNESIUM SERPL-MCNC: 1.8 MG/DL (ref 1.5–2.5)
MAGNESIUM SERPL-MCNC: 2 MG/DL (ref 1.5–2.5)
MAGNESIUM SERPL-MCNC: 2.1 MG/DL (ref 1.5–2.5)
MAGNESIUM SERPL-MCNC: 2.2 MG/DL (ref 1.5–2.5)
MAGNESIUM SERPL-MCNC: 2.8 MG/DL (ref 1.5–2.5)
MCH RBC QN AUTO: 28 PG (ref 27–33)
MCHC RBC AUTO-ENTMCNC: 32.9 G/DL (ref 33.6–35)
MCV RBC AUTO: 84.9 FL (ref 81.4–97.8)
PHOSPHATE SERPL-MCNC: 0.6 MG/DL (ref 2.5–4.5)
PHOSPHATE SERPL-MCNC: 1.5 MG/DL (ref 2.5–4.5)
PHOSPHATE SERPL-MCNC: 1.6 MG/DL (ref 2.5–4.5)
PHOSPHATE SERPL-MCNC: 2 MG/DL (ref 2.5–4.5)
PHOSPHATE SERPL-MCNC: 2.2 MG/DL (ref 2.5–4.5)
PHOSPHATE SERPL-MCNC: 3.2 MG/DL (ref 2.5–4.5)
PLATELET # BLD AUTO: 133 K/UL (ref 164–446)
PMV BLD AUTO: 10.7 FL (ref 9–12.9)
POTASSIUM SERPL-SCNC: 3.9 MMOL/L (ref 3.6–5.5)
POTASSIUM SERPL-SCNC: 4 MMOL/L (ref 3.6–5.5)
POTASSIUM SERPL-SCNC: 4.4 MMOL/L (ref 3.6–5.5)
PROT SERPL-MCNC: 5.6 G/DL (ref 6–8.2)
RBC # BLD AUTO: 2.79 M/UL (ref 4.2–5.4)
SODIUM SERPL-SCNC: 138 MMOL/L (ref 135–145)
SODIUM SERPL-SCNC: 140 MMOL/L (ref 135–145)
SODIUM SERPL-SCNC: 140 MMOL/L (ref 135–145)
WBC # BLD AUTO: 3.6 K/UL (ref 4.8–10.8)

## 2021-10-22 PROCEDURE — 700102 HCHG RX REV CODE 250 W/ 637 OVERRIDE(OP): Performed by: STUDENT IN AN ORGANIZED HEALTH CARE EDUCATION/TRAINING PROGRAM

## 2021-10-22 PROCEDURE — 700101 HCHG RX REV CODE 250: Performed by: INTERNAL MEDICINE

## 2021-10-22 PROCEDURE — 93010 ELECTROCARDIOGRAM REPORT: CPT | Mod: 76 | Performed by: INTERNAL MEDICINE

## 2021-10-22 PROCEDURE — 700111 HCHG RX REV CODE 636 W/ 250 OVERRIDE (IP): Performed by: STUDENT IN AN ORGANIZED HEALTH CARE EDUCATION/TRAINING PROGRAM

## 2021-10-22 PROCEDURE — 80048 BASIC METABOLIC PNL TOTAL CA: CPT

## 2021-10-22 PROCEDURE — 700101 HCHG RX REV CODE 250: Performed by: STUDENT IN AN ORGANIZED HEALTH CARE EDUCATION/TRAINING PROGRAM

## 2021-10-22 PROCEDURE — A9270 NON-COVERED ITEM OR SERVICE: HCPCS | Performed by: STUDENT IN AN ORGANIZED HEALTH CARE EDUCATION/TRAINING PROGRAM

## 2021-10-22 PROCEDURE — 36415 COLL VENOUS BLD VENIPUNCTURE: CPT

## 2021-10-22 PROCEDURE — 80053 COMPREHEN METABOLIC PANEL: CPT

## 2021-10-22 PROCEDURE — 83735 ASSAY OF MAGNESIUM: CPT | Mod: 91

## 2021-10-22 PROCEDURE — 93005 ELECTROCARDIOGRAM TRACING: CPT | Performed by: STUDENT IN AN ORGANIZED HEALTH CARE EDUCATION/TRAINING PROGRAM

## 2021-10-22 PROCEDURE — 700102 HCHG RX REV CODE 250 W/ 637 OVERRIDE(OP): Performed by: INTERNAL MEDICINE

## 2021-10-22 PROCEDURE — 770020 HCHG ROOM/CARE - TELE (206)

## 2021-10-22 PROCEDURE — 93005 ELECTROCARDIOGRAM TRACING: CPT | Performed by: INTERNAL MEDICINE

## 2021-10-22 PROCEDURE — 85027 COMPLETE CBC AUTOMATED: CPT

## 2021-10-22 PROCEDURE — 84100 ASSAY OF PHOSPHORUS: CPT | Mod: 91

## 2021-10-22 PROCEDURE — 94760 N-INVAS EAR/PLS OXIMETRY 1: CPT

## 2021-10-22 PROCEDURE — 93010 ELECTROCARDIOGRAM REPORT: CPT | Performed by: INTERNAL MEDICINE

## 2021-10-22 PROCEDURE — 99232 SBSQ HOSP IP/OBS MODERATE 35: CPT | Mod: GC | Performed by: INTERNAL MEDICINE

## 2021-10-22 PROCEDURE — A9270 NON-COVERED ITEM OR SERVICE: HCPCS | Performed by: INTERNAL MEDICINE

## 2021-10-22 PROCEDURE — 700105 HCHG RX REV CODE 258: Performed by: STUDENT IN AN ORGANIZED HEALTH CARE EDUCATION/TRAINING PROGRAM

## 2021-10-22 RX ORDER — MAGNESIUM SULFATE HEPTAHYDRATE 40 MG/ML
2 INJECTION, SOLUTION INTRAVENOUS 2 TIMES DAILY
Status: DISCONTINUED | OUTPATIENT
Start: 2021-10-22 | End: 2021-10-22

## 2021-10-22 RX ORDER — MAGNESIUM SULFATE HEPTAHYDRATE 40 MG/ML
2 INJECTION, SOLUTION INTRAVENOUS ONCE
Status: DISCONTINUED | OUTPATIENT
Start: 2021-10-22 | End: 2021-10-22

## 2021-10-22 RX ADMIN — ONDANSETRON 4 MG: 4 TABLET, ORALLY DISINTEGRATING ORAL at 05:38

## 2021-10-22 RX ADMIN — OXYCODONE 5 MG: 5 TABLET ORAL at 21:41

## 2021-10-22 RX ADMIN — POTASSIUM PHOSPHATE, MONOBASIC AND POTASSIUM PHOSPHATE, DIBASIC 30 MMOL: 224; 236 INJECTION, SOLUTION, CONCENTRATE INTRAVENOUS at 03:49

## 2021-10-22 RX ADMIN — ONDANSETRON 4 MG: 4 TABLET, ORALLY DISINTEGRATING ORAL at 12:18

## 2021-10-22 RX ADMIN — Medication 400 MG: at 17:22

## 2021-10-22 RX ADMIN — THERA TABS 1 TABLET: TAB at 05:38

## 2021-10-22 RX ADMIN — FOLIC ACID 1 MG: 1 TABLET ORAL at 05:38

## 2021-10-22 RX ADMIN — LORAZEPAM 0.5 MG: 0.5 TABLET ORAL at 12:16

## 2021-10-22 RX ADMIN — BUSPIRONE HYDROCHLORIDE 15 MG: 10 TABLET ORAL at 17:19

## 2021-10-22 RX ADMIN — DIBASIC SODIUM PHOSPHATE, MONOBASIC POTASSIUM PHOSPHATE AND MONOBASIC SODIUM PHOSPHATE 500 MG: 852; 155; 130 TABLET ORAL at 17:20

## 2021-10-22 RX ADMIN — DIBASIC SODIUM PHOSPHATE, MONOBASIC POTASSIUM PHOSPHATE AND MONOBASIC SODIUM PHOSPHATE 500 MG: 852; 155; 130 TABLET ORAL at 11:54

## 2021-10-22 RX ADMIN — DIBASIC SODIUM PHOSPHATE, MONOBASIC POTASSIUM PHOSPHATE AND MONOBASIC SODIUM PHOSPHATE 500 MG: 852; 155; 130 TABLET ORAL at 05:39

## 2021-10-22 RX ADMIN — BUSPIRONE HYDROCHLORIDE 15 MG: 10 TABLET ORAL at 05:38

## 2021-10-22 RX ADMIN — ONDANSETRON 4 MG: 4 TABLET, ORALLY DISINTEGRATING ORAL at 21:42

## 2021-10-22 RX ADMIN — OMEPRAZOLE 20 MG: 20 CAPSULE, DELAYED RELEASE ORAL at 05:38

## 2021-10-22 RX ADMIN — FLUOXETINE 20 MG: 20 CAPSULE ORAL at 05:38

## 2021-10-22 RX ADMIN — LORAZEPAM 0.5 MG: 0.5 TABLET ORAL at 21:41

## 2021-10-22 RX ADMIN — GABAPENTIN 300 MG: 300 CAPSULE ORAL at 05:38

## 2021-10-22 RX ADMIN — GABAPENTIN 300 MG: 300 CAPSULE ORAL at 17:19

## 2021-10-22 RX ADMIN — POTASSIUM PHOSPHATE, MONOBASIC AND POTASSIUM PHOSPHATE, DIBASIC 30 MMOL: 224; 236 INJECTION, SOLUTION, CONCENTRATE INTRAVENOUS at 18:47

## 2021-10-22 RX ADMIN — LORAZEPAM 0.5 MG: 0.5 TABLET ORAL at 08:40

## 2021-10-22 RX ADMIN — ENOXAPARIN SODIUM 40 MG: 40 INJECTION SUBCUTANEOUS at 05:38

## 2021-10-22 RX ADMIN — POTASSIUM CHLORIDE AND SODIUM CHLORIDE: 900; 300 INJECTION, SOLUTION INTRAVENOUS at 01:15

## 2021-10-22 RX ADMIN — ONDANSETRON 4 MG: 4 TABLET, ORALLY DISINTEGRATING ORAL at 17:22

## 2021-10-22 RX ADMIN — Medication 100 MG: at 05:39

## 2021-10-22 RX ADMIN — GABAPENTIN 300 MG: 300 CAPSULE ORAL at 11:54

## 2021-10-22 RX ADMIN — Medication 400 MG: at 05:38

## 2021-10-22 RX ADMIN — LORAZEPAM 1 MG: 1 TABLET ORAL at 17:30

## 2021-10-22 RX ADMIN — TRAZODONE HYDROCHLORIDE 300 MG: 100 TABLET ORAL at 21:42

## 2021-10-22 RX ADMIN — Medication 5 MG: at 21:41

## 2021-10-22 ASSESSMENT — LIFESTYLE VARIABLES
HEADACHE, FULLNESS IN HEAD: VERY MILD
ORIENTATION AND CLOUDING OF SENSORIUM: ORIENTED AND CAN DO SERIAL ADDITIONS
AGITATION: NORMAL ACTIVITY
AGITATION: NORMAL ACTIVITY
VISUAL DISTURBANCES: NOT PRESENT
PAROXYSMAL SWEATS: NO SWEAT VISIBLE
TOTAL SCORE: 5
VISUAL DISTURBANCES: NOT PRESENT
ANXIETY: *
NAUSEA AND VOMITING: NO NAUSEA AND NO VOMITING
AGITATION: NORMAL ACTIVITY
PAROXYSMAL SWEATS: NO SWEAT VISIBLE
AUDITORY DISTURBANCES: NOT PRESENT
TREMOR: MODERATE TREMOR WITH ARMS EXTENDED
ANXIETY: *
AUDITORY DISTURBANCES: NOT PRESENT
VISUAL DISTURBANCES: NOT PRESENT
HEADACHE, FULLNESS IN HEAD: NOT PRESENT
TREMOR: MODERATE TREMOR WITH ARMS EXTENDED
HEADACHE, FULLNESS IN HEAD: NOT PRESENT
TREMOR: TREMOR NOT VISIBLE BUT CAN BE FELT, FINGERTIP TO FINGERTIP
NAUSEA AND VOMITING: MILD NAUSEA WITH NO VOMITING
ANXIETY: MILDLY ANXIOUS
ANXIETY: NO ANXIETY (AT EASE)
TREMOR: MODERATE TREMOR WITH ARMS EXTENDED
TOTAL SCORE: 7
AUDITORY DISTURBANCES: NOT PRESENT
TOTAL SCORE: 2
TREMOR: TREMOR NOT VISIBLE BUT CAN BE FELT, FINGERTIP TO FINGERTIP
NAUSEA AND VOMITING: NO NAUSEA AND NO VOMITING
AUDITORY DISTURBANCES: NOT PRESENT
NAUSEA AND VOMITING: MILD NAUSEA WITH NO VOMITING
NAUSEA AND VOMITING: MILD NAUSEA WITH NO VOMITING
ORIENTATION AND CLOUDING OF SENSORIUM: ORIENTED AND CAN DO SERIAL ADDITIONS
ANXIETY: NO ANXIETY (AT EASE)
ORIENTATION AND CLOUDING OF SENSORIUM: ORIENTED AND CAN DO SERIAL ADDITIONS
ORIENTATION AND CLOUDING OF SENSORIUM: ORIENTED AND CAN DO SERIAL ADDITIONS
TOTAL SCORE: 2
HEADACHE, FULLNESS IN HEAD: NOT PRESENT
TREMOR: *
AGITATION: NORMAL ACTIVITY
AGITATION: NORMAL ACTIVITY
ANXIETY: NO ANXIETY (AT EASE)
VISUAL DISTURBANCES: NOT PRESENT
VISUAL DISTURBANCES: NOT PRESENT
NAUSEA AND VOMITING: MILD NAUSEA WITH NO VOMITING
ANXIETY: *
PAROXYSMAL SWEATS: NO SWEAT VISIBLE
HEADACHE, FULLNESS IN HEAD: VERY MILD
VISUAL DISTURBANCES: NOT PRESENT
ORIENTATION AND CLOUDING OF SENSORIUM: ORIENTED AND CAN DO SERIAL ADDITIONS
AGITATION: NORMAL ACTIVITY
AUDITORY DISTURBANCES: NOT PRESENT
HEADACHE, FULLNESS IN HEAD: NOT PRESENT
AUDITORY DISTURBANCES: NOT PRESENT
AGITATION: NORMAL ACTIVITY
TOTAL SCORE: 9
ORIENTATION AND CLOUDING OF SENSORIUM: ORIENTED AND CAN DO SERIAL ADDITIONS
TOTAL SCORE: 7
PAROXYSMAL SWEATS: NO SWEAT VISIBLE

## 2021-10-22 ASSESSMENT — ENCOUNTER SYMPTOMS
PALPITATIONS: 0
CHILLS: 0
VOMITING: 0
COUGH: 0
NAUSEA: 0
HEADACHES: 1
FEVER: 0

## 2021-10-22 ASSESSMENT — PAIN DESCRIPTION - PAIN TYPE
TYPE: ACUTE PAIN
TYPE: ACUTE PAIN

## 2021-10-22 ASSESSMENT — FIBROSIS 4 INDEX: FIB4 SCORE: 8.51

## 2021-10-22 NOTE — DIETARY
"Nutrition services: Day 1 of admit.  Donna Morales is a 41 y.o. female with admitting DX of  hypokalemia  Consult received for MST 3: 14-23 lb weight loss in 3 months    Met with pt at beside. She reports she recently lost 20-30 lbs d/t stress and n/v for the past 2 weeks. She states she vomited today.  lbs. Pt feel asleep during visit.     Assessment:  Height: 162.6 cm (5' 4\")  Weight: 71.2 kg (156 lb 15.5 oz)  Body mass index is 26.94 kg/m²., BMI classification: overweight  Diet/Intake: Cardiac Diet    Evaluation:   1. Pt admitted for hypokalemia with nausea, vomiting, diarrhea, alcohol use disorder, hypomagnesemia, hypokalemia, metabolic acidosis, anemia, transaminitis  2. Per MD note, pt has a 1 month history of poor PO intake with chronic alcohol abuse  3. Per Chart History, pt's weight was 187.6 lb (08/05/21)  Pt has had a 16.3% weight loss in < 3 months which is severe.    4. Limited information to review in ADL's for PO intake. Pt consumed 25-50% of breakfast based on RD judgment of tray in room today  5. Labs: phos 2.0, Mg 2.8  6. Meds: Mag oxide, omeprazole, Zofran, Phos, Potassium bicarb, thiamine, MVI, folic acid, Ativan, promethazine, prochlorperazine  7. Skin: No staged wounds noted  8. GI: last BM 10/22      Malnutrition Risk: Pt at risk d/t 16.3% weight loss in < 3 months which is severe. Pt fell asleep when attempting to perform Nutrition Focused Physical Exam.     Recommendations/Plan:  1. Continue Cardiac Diet  2. Encourage intake of meals  3. Document intake of all meals  as % taken in ADL's to provide interdisciplinary communication across all shifts.   4. Monitor weight.  5. Monitor labs for refeeding daily. Thiamine in MAR.   6. Nutrition Focused Physical Exam  7. Nutrition rep will continue to see patient for ongoing meal and snack preferences.     RD following.             "

## 2021-10-22 NOTE — ASSESSMENT & PLAN NOTE
Resolving  High anion gap metabolic acidosis likely secondary to lactic acidosis in the setting of dehydration and poor p.o. intake versus starvation ketosis given poor p.o. intake and alcohol use.  -IV fluids to replete  -Fixed underlying medical conditions

## 2021-10-22 NOTE — ASSESSMENT & PLAN NOTE
AST to ALT greater than 2-1 predominance without cholestatic pattern consistent with alcohol use.  -We will continue to follow on CMP

## 2021-10-22 NOTE — PROGRESS NOTES
[facility] ATTENDING NOTE:   Gael Harris MD      Visit Time:   Attending/resident bedside rounds 9-11:30 AM     PATIENT ID  Name:             Donna Morales   YOB: 1980  Age:                 41 y.o.  female   MRN:               [id number]  Admit:  10/21/2021     I saw and examined the patient and discussed the management with the resident staff.  I reviewed the resident's note and agree with the resident's findings and plan as documented in the resident's note except as documented in the attending note. Please reference resident daily note for complete information.    The chart was reviewed and summarized.  Available labs, imaging, O2 sats ,  EKGs were reviewed. Available nursing, consultant, and resident notes were reviewed. I am actively involved in the patient's care.                                                                              ______________________________________________________________________            41(  admit Oct 21  )  INTERVAL:  Chart reviewed/summarized,       admit from ER with K severely low 2.3, complications of ETOH use, poor PO intake, no NVD, no diuretics, no DM2 , ETOH hx      OCT 21: AF, HR , 95% RA, 71KG     OPM: trazodone, melatonin, trazodone, prozadc 20mg, buspar     CORE:  Code Status (  FULL  --------------------------------------------------------------------------------------------------  Hospital Summary/ Patient System Review      NP:   *admit(    Impression: KERRIE: trazadone, prozac, buspar use ,  Impressoin: hx ETOH use  - c/b fatty liver, pancreatitis, DAVID  - thiamine, CIWA/gabapentin   Impression: high risk for DAVID -- thiamine, gabapentin, CIWA, K, MG , fluid repair      EENT:   *admit(       MSK/PAIN:   *admit(       CVS:   *admit(  ,   EKG: QTc 476, SR      PULM:   *admit(       GI:   *admit(  BMI 26, AST 184H, ALT 50, ALP 104H, BR 0.9, ALB 4.2,  33% FE, lipase 7  ImpressioN: hepatitis, esophagitis,  gastritis due to ETOH  - PPI   Impression: Bx (2006) mod fatty change/no fibrosis,  hx cholecystectomy     2019 CT w/o: RK pyelo ? , no abscess, fatty enlargement of liver , fibrodis, chronic pancreatitis , US: no ascteis, hepatopetal flow, liver 11cm, no ascites , post lissa     :   *admit(  UA 2-5 WBC, 0-2 RBC     RENAL:   *admit(  Na 137, K 2.3, CO2 19, dgap 8, , Bun 17, c R1.25 (1.04) , CA 9.2, ALB 4.2, UK 6 (appropriately low) , MG 1.4L   Impression: HAGMA with severely low KCL, poor nutrition, GI loss , low MG   , PO/IV replacement MG  Impression: prerenal ANEL, poor PO intake, > reassess after fluids      HEME:   *admit(  WBC 5.9, HB 9.8, PTL 143L, monos 920H, basophils 160H , macrocytosis , foatel 6, B12 1003, FT4 0.66, FT3 wnl , Retic 2.1, Ferritin 361   Impression: macrocytic anemia, nutritional/ETOH/liver pathology   --> Retic, B12, Ferritin, stool heme      ENDO:   *admit(  FT 0.94   Impression: hx euthyroid sick, recheck FT4      July 2021:  FT4 0.66L , TSH 1.320 , FT3 2.03      DERM/BREAST:   *admit(       ID:   *admit(

## 2021-10-22 NOTE — H&P
"History & Physical Note    Date of Admission: 10/21/2021  Admission Status: Inpatient  [facility] Team: [facility]  Blue Team  Attending: Gael Harris M.D.   Senior Resident: Dr. Penaloza  Intern: Dr. Freeman  Contact Number: [phone]    Chief Complaint: \"My psychiatrist told me to come in because my potassium was low x2 days.\"    History of Present Illness (HPI):   Donna is a 41 y.o. female who presented 10/21/2021 with a potassium of 2.3, currently asymptomatic, a 2-month history of poor p.o. intake secondary to nausea and vomiting as well as diarrhea in the setting of chronic alcohol use, without much other p.o. intake.  Patient states that within the last 2 months she has had many stressors in her life including losing her job as a .  Patient has been working with her psychiatrist to deal with depression and anxiety.  Patient endorses some right upper quadrant pain, that does not radiate to back or shoulder, is not better or worse after eating.  Patient describes the pain as intermittent and sometimes sharp, though patient has a history of gallbladder removal.  Patient also has right upper quadrant ultrasound within the past 10 years without evidence of cirrhosis.  Patient states that anytime she tries to eat food she feels nauseous and endorses vomiting, and as such is only able to keep down alcohol drinking approximately three quarters of a standard sized bottle of vodka per day.  Patient states that her last drink was approximately this morning.  Vomiting is nonbilious, usually clear, associated with nausea, not temporal to any specific time.  Patient not endorsing dysphagia or choking.  Patient also endorsing diarrhea, patient having approximately 4 stools per day usually small-volume, brown, soft [place] stool chart scale type VI, not fatty do not float on ball, no melena or hematochezia.  Patient denies any new fevers, chills, night sweats, chest pain, shortness of breath, dysuria, " musculoskeletal pain, weakness, lightheadedness, diaphoresis, or tremors.    Review of Systems:   Review of Systems   Constitutional: Negative for chills, fever, malaise/fatigue and weight loss.   HENT: Negative for congestion, hearing loss, nosebleeds and sore throat.    Eyes: Negative for blurred vision, pain and redness.   Respiratory: Negative for cough, hemoptysis, sputum production, shortness of breath and wheezing.    Cardiovascular: Negative for chest pain, palpitations and leg swelling.   Gastrointestinal: Positive for diarrhea, nausea and vomiting. Negative for abdominal pain and heartburn.   Genitourinary: Negative for dysuria, frequency and hematuria.   Musculoskeletal: Negative for back pain, joint pain, myalgias and neck pain.   Skin: Negative for itching and rash.   Neurological: Negative for dizziness, tremors, focal weakness, seizures, weakness and headaches.   Psychiatric/Behavioral: Negative for depression. The patient is not nervous/anxious.         Past Medical History:   Past Medical History was reviewed with patient.   has a past medical history of Alcoholism (HCC), Anemia, Anesthesia, Anxiety, Dialysis (2008), Heart burn, Hypertension, Kidney failure (2008), Liver failure (HCC), Other specified symptom associated with female genital organs, Pancreatitis, and Wrist pain.    Past Surgical History: Past Surgical History was reviewed with patient.   has a past surgical history that includes lissa by laparoscopy (2006); laparoscopy (2000); and flexor tendon repair (7/18/2012).    Medications: Medications have been reviewed with patient.  Prior to Admission Medications   Prescriptions Last Dose Informant Patient Reported? Taking?   FLUoxetine (PROZAC) 20 MG Cap 10/20/2021 at PM Patient No No   Sig: Take 1 Capsule by mouth every day.   Melatonin 10 MG Tab 10/20/2021 at PM Patient Yes Yes   Sig: Take 20 mg by mouth at bedtime.   busPIRone (BUSPAR) 15 MG tablet 10/20/2021 at PM Patient No No   Sig:  Take 1 Tablet by mouth 2 times a day.   traZODone (DESYREL) 100 MG Tab 10/20/2021 at PM Patient Yes Yes   Sig: Take 100-300 mg by mouth every evening.   traZODone (DESYREL) 150 MG Tab NOT YET STARTED at NOT YET STARTED Patient No No   Sig: Take 1-2 Tablets by mouth at bedtime.      Facility-Administered Medications: None        Allergies: Allergies have been reviewed with patient.  No Known Allergies    Family History:   family history includes Alcohol abuse in her father, paternal aunt, paternal grandfather, and paternal grandmother; Diabetes in an other family member; Drug abuse in her father and paternal aunt; Hypertension in an other family member; Psychiatric Illness in her paternal grandmother; Stroke in her maternal grandmother and another family member.      Social History:   Tobacco: Denies history  Alcohol: Historically drank 1 bottle of vodka standard size per day, now three quarters bottle of vodka standard size per day, history of alcohol withdrawals complicated by seizures  Recreational drugs (illegal and prescription): Denies use  Employment: Currently unemployed and arbitration as a .  Activity Level: Active, performs all ADLs,   Living situation: Lives alone, helps take care of grandmother, family in town  Recent travel: Denies any recent travel  Primary Care Provider: reviewed David Coughlin M.D.  Other (stressors, spirituality, exposures): Patient recently without contract renewed for job, significant stressor in the last 2 months.  Physical Exam:   Vitals:  Temp:  [36.1 °C (96.9 °F)-36.8 °C (98.3 °F)] 36.6 °C (97.8 °F)  Pulse:  [] 92  Resp:  [15-18] 18  BP: (101-139)/(72-86) 136/78  SpO2:  [91 %-100 %] 95 %    Physical Exam  Constitutional:       General: She is not in acute distress.     Appearance: Normal appearance. She is not ill-appearing.   HENT:      Head: Normocephalic and atraumatic.      Right Ear: External ear normal.      Left Ear: External ear normal.       Nose: Nose normal. No congestion or rhinorrhea.      Mouth/Throat:      Mouth: Mucous membranes are moist.      Pharynx: Oropharynx is clear. No oropharyngeal exudate or posterior oropharyngeal erythema.   Eyes:      General:         Right eye: No discharge.      Extraocular Movements: Extraocular movements intact.      Conjunctiva/sclera: Conjunctivae normal.      Pupils: Pupils are equal, round, and reactive to light.   Cardiovascular:      Rate and Rhythm: Normal rate and regular rhythm.      Pulses: Normal pulses.      Heart sounds: Normal heart sounds. No murmur heard.   No friction rub. No gallop.    Pulmonary:      Effort: Pulmonary effort is normal. No respiratory distress.      Breath sounds: Normal breath sounds. No stridor. No wheezing, rhonchi or rales.   Abdominal:      General: Abdomen is flat. Bowel sounds are normal. There is no distension.      Palpations: Abdomen is soft. There is no mass.      Tenderness: There is no abdominal tenderness. There is no guarding.   Musculoskeletal:      Cervical back: Normal range of motion and neck supple. No tenderness.      Right lower leg: No edema.      Left lower leg: No edema.   Lymphadenopathy:      Cervical: No cervical adenopathy.   Skin:     General: Skin is warm and dry.      Capillary Refill: Capillary refill takes less than 2 seconds.      Coloration: Skin is not jaundiced.      Findings: No lesion or rash.   Neurological:      General: No focal deficit present.      Mental Status: She is alert and oriented to person, place, and time.      Cranial Nerves: No cranial nerve deficit.      Sensory: No sensory deficit.      Motor: No weakness.   Psychiatric:         Mood and Affect: Mood normal.         Behavior: Behavior normal.         Thought Content: Thought content normal.         Labs:   CBC remarkable for hemoglobin 9.8, chronic iron deficiency anemia, platelet count 143 consistent with alcohol use, white blood cells 5.9 unremarkable  CMP remarkable  for sodium of 137 potassium 2.3!,  Chloride 100, bicarb 19, glucose 130, creatinine 1.25, baseline likely around 1, AST of 184, ALT 50 consistent with alcoholic pattern, alk phos 104, total bilirubin 0.9 magnesium 1.4    EKG: Per my read, sinus rhythm, QTc 471    Imaging:   No new imaging    Previous Data Review: reviewed    Problem Representation:   Ms. Morales is a pleasant 41-year-old female with a past medical history significant for chronic alcohol use complicated by withdrawals with seizures, pancreatitis, likely alcoholic gastritis, chronic normocytic anemia, with iron deficiency in the past as well as a history of pancytopenia secondary to alcohol use, presenting for hypokalemia with nausea and vomiting, and alcohol use.  Hypokalemia  Assessment & Plan  Hypokalemia likely in the setting of chronic alcohol use disorder as well as diarrhea and nausea and vomiting, obtained urine potassium which was 6, less likely intrinsic renal problem and more likely secondary to dilution via low osmotic fluid and alcohol use and underlying nausea and vomiting.  -Patient placed on telemetry for continued monitoring for arrhythmias  -Labs: Potassium every 2 hours  -Replete potassium with IV potassium as well as K-Lyte oral 50 mg every 2 hours  -We treat symptomatically with Zofran, patient denies diarrhea at this time    Diarrhea  Assessment & Plan  Patient with 2-month history of loose [place] school scale 6 bowel movements 4 times daily on average, while patient previously had well formed stools 1 time daily in the past.  Stools are not loose watery, not brown, no inciting events or recent travel, low concern for infectious etiology, possible pancreatic etiology versus osmotic diarrhea secondary to alcohol use and poor p.o. intake.  Likely contributing to metabolic abnormalities.  No complaints at this time.  -If lipase elevated can consider fecal fat, fecal calprotectin  -We will treat symptomatically for now if appears,  consider starting bulking agent    Nausea & vomiting  Assessment & Plan  Patient with nausea and vomiting possibly related to alcoholic pancreatitis, has had in the past, blood glucose slightly elevated at 130, patient endorsing preop previous EGD, though no records on file, denies any symptoms of GERD, on physical exam no right upper quadrant tenderness to palpation, no Cooper sign.  No left upper quadrant tenderness as well, no radiation of pain.  Likely secondary to alcohol use versus alcoholic pancreatitis.  Labs: Obtain lipase  Imaging: None at this time next  Therapeutic: Zofran scheduled      Adjustment disorder with mixed anxiety and depressed mood- (present on admission)  Assessment & Plan  Continue patient's home medications, currently followed by psychiatry.    Chronic insomnia- (present on admission)  Assessment & Plan  Continue patient's home medications, trazodone and melatonin    Anxiety disorder, unspecified- (present on admission)  Assessment & Plan  Continue patient's home medications    Alcohol use disorder, severe, dependence (HCC)- (present on admission)  Assessment & Plan  Patient with history of chronic alcohol use, without known cirrhosis, transaminitis present, pancytopenia also present, history of withdrawal patient with last drink today.  Patient with family history of alcohol use disorder.  Patient does not wish to stop at this time.  -Continue to follow liver function tests, continue to replete underlying metabolic abnormalities as above  -CIWA protocol, fall, aspiration protocols  -Gabapentin 300 mg 3 times daily for seizure prophylaxis  -Counseled patient on alcohol cessation, discussed the deleterious effects of alcohol on her underlying illnesses such as hypokalemia, other unspecified metabolic abnormalities, pancytopenia as well as nausea and vomiting  -Given hypokalemia and chronic alcohol use, will avoid carbohydrates to avoid intracellular shift.  -Oral  thiamine        Hypomagnesemia- (present on admission)  Assessment & Plan  Current magnesium of 1.4 likely secondary to nutritional deficiencies as chronic alcoholism  -Replete IV and oral magnesium  -Monitor on labs    High anion gap metabolic acidosis- (present on admission)  Assessment & Plan  High anion gap metabolic acidosis likely secondary to lactic acidosis in the setting of dehydration and poor p.o. intake versus starvation ketosis given poor p.o. intake and alcohol use.  -IV fluids to replete  -Fixed underlying medical conditions      Transaminitis- (present on admission)  Assessment & Plan  AST to ALT greater than 2-1 predominance without cholestatic pattern consistent with alcohol use.  -We will continue to follow on CMP    Anemia- (present on admission)  Assessment & Plan  Patient with hemoglobin of 9.8, patient with a history of iron deficiency anemia per chart and per patient, patient was never received prescription for oral iron, and given her nausea and vomiting likely would not of tolerated.  Likely multifactorial in origin given chronic alcohol use and possible underlying liver damage.  -Labs: Obtain iron panel reticulocyte count  -Likely multifactorial in origin, can consider supplementing vitamins and iron given results of laboratory testing.  FEN: IV fluid with potassium, replete lites as above, Will avoid carbohydrates  GI: IV PPI  CODE STATUS: Full code.

## 2021-10-22 NOTE — ASSESSMENT & PLAN NOTE
Patient with nausea and vomiting possibly related to alcoholic pancreatitis, has had in the past, blood glucose slightly elevated at 130, patient endorsing preop previous EGD, though no records on file, denies any symptoms of GERD, on physical exam no right upper quadrant tenderness to palpation, no Cooper sign.  No left upper quadrant tenderness as well, no radiation of pain.  Likely secondary to alcohol use versus alcoholic pancreatitis.  Lipase not elevated, history of cholecystectomy.  Diffuse cramping pain likely secondary to diarrhea.  Imaging: None at this time next  Therapeutic: Zofran

## 2021-10-22 NOTE — ASSESSMENT & PLAN NOTE
Patient with hemoglobin of 9.8 on admission 7.8 yesterday likely 2/2 hemodilution, patient with a history of iron deficiency anemia per chart and per patient, patient was never received prescription for oral iron, and given her nausea and vomiting likely would not of tolerated.  Likely multifactorial in origin given chronic alcohol use and possible underlying liver damage.  -Likely multifactorial in origin, supplement vitamins and iron given results of laboratory testing.  -Consider IV iron supplementation.

## 2021-10-22 NOTE — CARE PLAN
Problem: Lifestyle Changes  Goal: Patient's ability to identify lifestyle changes and available resources to help reduce recurrence of condition will improve  Outcome: Not Progressing   The patient is Watcher - Medium risk of patient condition declining or worsening         Progress made toward(s) clinical / shift goals:      Patient is not progressing towards the following goals:      Problem: Lifestyle Changes  Goal: Patient's ability to identify lifestyle changes and available resources to help reduce recurrence of condition will improve  Outcome: Not Progressing

## 2021-10-22 NOTE — CARE PLAN
Problem: Nutritional:  Goal: Achieve adequate nutritional intake  Description: Patient will consume >50% of meals  Outcome: Not Met     See RD note.

## 2021-10-22 NOTE — PROGRESS NOTES
Daily Progress Note:     Date of Service: 10/22/2021  Primary Team: [facility] Blue Team   Attending: Dr. Harris  Senior Resident: Dr. Penaloza  Intern: Dr. Freeman  Contact:  [phone]    Chief Complaint: Psychiatrist recommended ER    Subjective:  Patient is a 41 year old female with PMH of chronic alcohol use complicated by withdrawals with seizures, pancreatitis, pancytopenia 2/2 alcohol use. She presented 10/21 with K of 2.3 without EKG changes. Patient reports 2 month hx of poor oral intake, daily alcohol use (3/4 a tall vodka bottle, last drink morning of admission), nausea with NBNB vomit, diarrhea. Admitted for electrolyte derangement.     Patient appeared very drowsy this morning. She was sleeping in bed. Denies any discomfort today.     Review of Systems:   Review of Systems   Constitutional: Negative for chills and fever.   Respiratory: Negative for cough.    Cardiovascular: Negative for chest pain and palpitations.   Gastrointestinal: Negative for nausea and vomiting.   Neurological: Positive for headaches.   All other systems reviewed and are negative.    Objective Data:   Vitals:   Temp:  [36.2 °C (97.1 °F)-37 °C (98.6 °F)] 36.3 °C (97.4 °F)  Pulse:  [] 88  Resp:  [16-18] 18  BP: ()/(66-91) 126/88  SpO2:  [93 %-99 %] 93 %    Physical Exam:   Physical Exam  Constitutional:       General: She is not in acute distress.     Appearance: She is normal weight. She is not ill-appearing.   HENT:      Mouth/Throat:      Mouth: Mucous membranes are moist.   Cardiovascular:      Rate and Rhythm: Normal rate and regular rhythm.      Pulses: Normal pulses.      Heart sounds: Normal heart sounds. No murmur heard.     Pulmonary:      Effort: Pulmonary effort is normal.      Breath sounds: No wheezing, rhonchi or rales.   Abdominal:      General: Abdomen is flat.      Palpations: Abdomen is soft.      Tenderness: There is no abdominal tenderness. There is no guarding or rebound.   Musculoskeletal:          General: Normal range of motion.      Right lower leg: No edema.      Left lower leg: No edema.   Skin:     General: Skin is warm.      Capillary Refill: Capillary refill takes less than 2 seconds.      Coloration: Skin is not jaundiced.   Neurological:      General: No focal deficit present.      Mental Status: She is alert and oriented to person, place, and time.   Psychiatric:         Mood and Affect: Mood normal.         Behavior: Behavior normal.     Labs:  CBC with HBG 7.8. WBC 3.6. .   Retic count normal   CMP with K improved, mag improved,   Urine K normal     Micro:  None    EKG:  3x sinus     Imaging:   None     Meds:  Current Outpatient Medications   Medication Instructions   • busPIRone (BUSPAR) 15 mg, Oral, 2 TIMES DAILY   • FLUoxetine (PROZAC) 20 mg, Oral, DAILY   • Melatonin 20 mg, Oral, EVERY BEDTIME   • traZODone (DESYREL) 150-300 mg, Oral, EVERY BEDTIME   • traZODone (DESYREL) 100-300 mg, Oral, NIGHTLY      Scheduled Medications   Medication Dose Frequency   • phosphorus  500 mg TID   • [Held by provider] potassium bicarbonate  25 mEq BID   • potassium phosphate IVPB (CUSTOM)  15 mmol Once   • magnesium sulfate  2 g Once   • busPIRone  15 mg BID   • FLUoxetine  20 mg DAILY   • enoxaparin  40 mg DAILY   • thiamine  100 mg DAILY    And   • multivitamin  1 Tablet DAILY    And   • folic acid  1 mg DAILY   • melatonin  5 mg Nightly   • gabapentin  300 mg TID   • ondansetron  4 mg Q4HRS   • omeprazole  20 mg DAILY   • magnesium oxide  400 mg BID   • traZODone  300 mg QHS     Consultants/Specialty:  None     Problem Representation: Patient is a 41 year old female with PMH of chronic alcohol use complicated by withdrawals with seizures, pancreatitis, pancytopenia 2/2 alcohol use. She presented 10/21 with K of 2.3 without EKG changes. Patient reports 2 month hx of poor oral intake, daily alcohol use (3/4 a tall vodka bottle, last drink morning of admission), nausea with NBNB vomit, diarrhea. Admitted  for electrolyte derangement.     Hypokalemia - (present on admission)  Assessment & Plan  Resolving  Hypokalemia likely in the setting of chronic alcohol use disorder as well as diarrhea and nausea and vomiting, obtained urine potassium which was 6, less likely intrinsic renal problem and more likely secondary to dilution via low osmotic fluid and alcohol use and underlying nausea and vomiting.  -Replete potassium with IV and oral potassium   -Treat nausea with Zofran  -Monitor CMP    Alcohol use disorder, severe, dependence (HCC)- (present on admission)  Assessment & Plan  Patient with history of chronic alcohol use, no known cirrhosis, transaminitis, pancytopenia  History of withdrawal patient with last drink today.    Patient does not wish to stop at this time.  -CIWA protocol, fall, aspiration protocols  -Gabapentin 300 mg 3 times daily for seizure prophylaxis  -Continue scheduled Zofran, omeprazole  -Counseled patient on alcohol cessation, discussed the deleterious effects of alcohol on her underlying illnesses such as hypokalemia, other unspecified metabolic abnormalities, pancytopenia as well as nausea and vomiting  -Continue to follow LFTs, replete electrolytes     Hypophosphatemia  Assessment & Plan  Improving   Likely secondary to nutritional deficiencies as chronic alcoholism  -Replete with IV and oral phos  -Monitor CMP    Hypomagnesemia- (present on admission)  Assessment & Plan  Resolved  Likely secondary to nutritional deficiencies as chronic alcoholism  -Replete with IV and oral magnesium  -Monitor CMP     Diarrhea- (present on admission)  Assessment & Plan  Improved  Patient with 2-month history of loose [place] school scale 6 bowel movements 4 times daily on average, while patient previously had well formed stools 1 time daily in the past.  Stools are not loose watery, not brown, no inciting events or recent travel, low concern for infectious etiology, possible pancreatic etiology versus osmotic  diarrhea secondary to alcohol use and poor p.o. intake.  Likely contributing to metabolic abnormalities.  No complaints at this time.  -We will treat symptomatically for now if appears, consider starting bulking agent     Nausea & vomiting  Assessment & Plan  Resolved  Patient reports prior EGD, though no records on file  No abdominal discomfort on exam  Likely secondary to alcohol use versus alcoholic pancreatitis.  Therapeutic: Zofran scheduled     Anemia- (present on admission)  Assessment & Plan  Patient with hemoglobin of 7.8-10.5. MCV normal.   Likely worsening due to hemodilution with IV fluids   History of iron deficiency anemia per chart   Likely also due to chronic alcohol use and bone marrow suppression  -Can consider iron supplement      Adjustment disorder with mixed anxiety and depressed mood- (present on admission)  Assessment & Plan  Continue patient's home medications, currently followed by psychiatry.     Chronic insomnia- (present on admission)  Assessment & Plan  Continue patient's home medications, trazodone and melatonin     Anxiety disorder, unspecified- (present on admission)  Assessment & Plan  Continue patient's home medications     High anion gap metabolic acidosis- (present on admission)  Assessment & Plan  Resolved   High anion gap metabolic acidosis likely secondary to lactic acidosis in the setting of dehydration and poor p.o. intake versus starvation ketosis given poor p.o. intake and alcohol use.  -IV fluids    Transaminitis- (present on admission)  Assessment & Plan  AST to ALT greater than 2-1, indicating that transaminitis is likely due to alcohol use    FEN: IV fluid   GI: PPI  CODE STATUS: Full code.

## 2021-10-22 NOTE — PROGRESS NOTES
[facility] [place] [facility]NAL MEDICINE ATTENDING NOTE:   Gael Harris MD      Visit Time:   Attending/resident bedside rounds 9-11:30 AM     PATIENT ID  Name:             Donna Morales   YOB: 1980  Age:                 41 y.o.  female   MRN:               [id number]  Admit:  10/21/2021     I saw and examined the patient and discussed the management with the resident staff.  I reviewed the resident's note and agree with the resident's findings and plan as documented in the resident's note except as documented in the attending note. Please reference resident daily note for complete information.    The chart was reviewed and summarized.  Available labs, imaging, O2 sats ,  EKGs were reviewed. Available nursing, consultant, and resident notes were reviewed. I am actively involved in the patient's care.                                                                              ______________________________________________________________________            41(  admit Oct 21  )  INTERVAL:  Chart reviewed/summarized,      MG 2.2, K 4, CR 1.10, CA 7.6, PHOS 2-2.22, WBC 3.6, HB 7.8,       Oct 22AM: VSS, , HR 80, has required few doses of ATIVAN, BP, HR stable currently   minimal DAVID, alert, lungs clear, not hyperadrenergic, K, MG better, abd soft - continue on CIWA, incr gabapentin if needed , K, MG, PHOS replacement   WBC 3.6, HB 7.8 (10), , K 4.4 (2.6),  , GFR 48 , phos 2.2     admit from ER with K severely low 2.3, complications of ETOH use, poor PO intake, no NVD, no diuretics, no DM2 , ETOH hx      OCT 21: AF, HR , 95% RA, 71KG     OPM: trazodone, melatonin, trazodone, prozadc 20mg, buspar     CORE:  Code Status (  FULL  --------------------------------------------------------------------------------------------------  Hospital Summary/ Patient System Review      NP:   *admit(    Impression: KERRIE: trazadone, prozac, buspar use ,  Impressoin: hx ETOH use  - c/b fatty  liver, pancreatitis, DAVID  - thiamine, CIWA/gabapentin   Impression: DAVID  -- thiamine, gabapentin, CIWA, K, MG , fluid repair      EENT:   *admit(       MSK/PAIN:   *admit(       CVS:   *admit(  ,   EKG: QTc 476, SR      PULM:   *admit(       GI:   *admit(  BMI 26, AST 184H, ALT 50, ALP 104H, BR 0.9, ALB 4.2,  33% FE, lipase 7  ImpressioN: hepatitis, esophagitis, gastritis due to ETOH  - PPI   Impression: Bx (2006) mod fatty change/no fibrosis,  hx cholecystectomy     2019 CT w/o: RK pyelo ? , no abscess, fatty enlargement of liver , fibrodis, chronic pancreatitis , US: no ascteis, hepatopetal flow, liver 11cm, no ascites , post lissa     :   *admit(  UA 2-5 WBC, 0-2 RBC     RENAL:   *admit(  Na 137, K 2.3, CO2 19, dgap 8, , Bun 17, c R1.25 (1.04) , CA 9.2, ALB 4.2, UK 6 (appropriately low) , MG 1.4L   Impression: HAGMA with severely low KCL, poor nutrition, GI loss , low MG , ETOH ketoacidosis   , PO/IV replacement MG -> improved   Impression: prerenal ANEL, poor PO intake, > reassess after fluids  - resolving      HEME:   *admit(  WBC 5.9, HB 9.8, PTL 143L, monos 920H, basophils 160H , macrocytosis , foatel 6, B12 1003, FT4 0.66, FT3 wnl , Retic 2.1, Ferritin 361   Impression: macrocytic anemia, nutritional/ETOH/liver pathology   --> Retic, B12, Ferritin, stool heme   Impression: pancytopenia, toxic      ENDO:   *admit(  FT 0.94   Impression: hx euthyroid sick, recheck FT4      July 2021:  FT4 0.66L , TSH 1.320 , FT3 2.03      DERM/BREAST:   *admit(       ID:   *admit(

## 2021-10-22 NOTE — ASSESSMENT & PLAN NOTE
Current magnesium of 1.8 likely secondary to nutritional deficiencies as chronic alcoholism  -Replete IV and oral magnesium  -Monitor on labs

## 2021-10-22 NOTE — ASSESSMENT & PLAN NOTE
Patient with 2-month history of loose [place] [employer] scale 6 bowel movements 4 times daily on average, while patient previously had well formed stools 1 time daily in the past.  Stools are not loose watery, not brown, no inciting events or recent travel, low concern for infectious etiology, possible pancreatic etiology versus osmotic diarrhea secondary to alcohol use and poor p.o. intake.  Likely contributing to metabolic abnormalities. Improving.  -Loperamide

## 2021-10-22 NOTE — ASSESSMENT & PLAN NOTE
Patient with history of chronic alcohol use, without known cirrhosis, transaminitis present, pancytopenia also present, history of withdrawal patient with last drink today.  Patient with family history of alcohol use disorder.  Patient does wish to stop at this time.  CIWA is low, only requiring small amounts of Ativan in addition to gabapentin.  -Continue to follow liver function tests, continue to replete underlying metabolic abnormalities as above  -CIWA protocol, fall, aspirtion protocolsa  -Gabapentin 300 mg 3 times daily for seizure prophylaxis  -Counseled patient on alcohol cessation, discussed the deleterious effects of alcohol on her underlying illnesses such as hypokalemia, other unspecified metabolic abnormalities, pancytopenia as well as nausea and vomiting  -Oral thiamine

## 2021-10-22 NOTE — ASSESSMENT & PLAN NOTE
Hypokalemia likely in the setting of chronic alcohol use disorder as well as diarrhea and nausea and vomiting, obtained urine potassium which was 6, less likely intrinsic renal problem and more likely secondary to dilution via low osmotic fluid and alcohol use and underlying nausea and vomiting.  -Patient placed on telemetry for continued monitoring for arrhythmias  -Labs: BMP, MG phos daily   -Recheck K might need daily K though now wnl's likely some intrinsic GI loss.   -We treat symptomatically with Zofran

## 2021-10-23 LAB
ANION GAP SERPL CALC-SCNC: 13 MMOL/L (ref 7–16)
ANION GAP SERPL CALC-SCNC: 14 MMOL/L (ref 7–16)
BASOPHILS # BLD AUTO: 1.1 % (ref 0–1.8)
BASOPHILS # BLD: 0.05 K/UL (ref 0–0.12)
BUN SERPL-MCNC: 8 MG/DL (ref 8–22)
BUN SERPL-MCNC: 9 MG/DL (ref 8–22)
CALCIUM SERPL-MCNC: 8.1 MG/DL (ref 8.5–10.5)
CALCIUM SERPL-MCNC: 8.3 MG/DL (ref 8.5–10.5)
CHLORIDE SERPL-SCNC: 106 MMOL/L (ref 96–112)
CHLORIDE SERPL-SCNC: 108 MMOL/L (ref 96–112)
CO2 SERPL-SCNC: 18 MMOL/L (ref 20–33)
CO2 SERPL-SCNC: 19 MMOL/L (ref 20–33)
CREAT SERPL-MCNC: 1.11 MG/DL (ref 0.5–1.4)
CREAT SERPL-MCNC: 1.23 MG/DL (ref 0.5–1.4)
EOSINOPHIL # BLD AUTO: 0.08 K/UL (ref 0–0.51)
EOSINOPHIL NFR BLD: 1.7 % (ref 0–6.9)
ERYTHROCYTE [DISTWIDTH] IN BLOOD BY AUTOMATED COUNT: 89.3 FL (ref 35.9–50)
GLUCOSE SERPL-MCNC: 109 MG/DL (ref 65–99)
GLUCOSE SERPL-MCNC: 121 MG/DL (ref 65–99)
HCT VFR BLD AUTO: 25.7 % (ref 37–47)
HGB BLD-MCNC: 7.9 G/DL (ref 12–16)
IMM GRANULOCYTES # BLD AUTO: 0.11 K/UL (ref 0–0.11)
IMM GRANULOCYTES NFR BLD AUTO: 2.4 % (ref 0–0.9)
LYMPHOCYTES # BLD AUTO: 1.79 K/UL (ref 1–4.8)
LYMPHOCYTES NFR BLD: 38.2 % (ref 22–41)
MAGNESIUM SERPL-MCNC: 1.7 MG/DL (ref 1.5–2.5)
MAGNESIUM SERPL-MCNC: 1.7 MG/DL (ref 1.5–2.5)
MAGNESIUM SERPL-MCNC: 2.1 MG/DL (ref 1.5–2.5)
MCH RBC QN AUTO: 27.6 PG (ref 27–33)
MCHC RBC AUTO-ENTMCNC: 30.7 G/DL (ref 33.6–35)
MCV RBC AUTO: 89.9 FL (ref 81.4–97.8)
MONOCYTES # BLD AUTO: 0.57 K/UL (ref 0–0.85)
MONOCYTES NFR BLD AUTO: 12.2 % (ref 0–13.4)
NEUTROPHILS # BLD AUTO: 2.08 K/UL (ref 2–7.15)
NEUTROPHILS NFR BLD: 44.4 % (ref 44–72)
NRBC # BLD AUTO: 0.13 K/UL
NRBC BLD-RTO: 2.8 /100 WBC
PHOSPHATE SERPL-MCNC: 4.5 MG/DL (ref 2.5–4.5)
PHOSPHATE SERPL-MCNC: 4.7 MG/DL (ref 2.5–4.5)
PLATELET # BLD AUTO: 154 K/UL (ref 164–446)
PMV BLD AUTO: 10.7 FL (ref 9–12.9)
POTASSIUM SERPL-SCNC: 4.1 MMOL/L (ref 3.6–5.5)
POTASSIUM SERPL-SCNC: 4.3 MMOL/L (ref 3.6–5.5)
RBC # BLD AUTO: 2.86 M/UL (ref 4.2–5.4)
SODIUM SERPL-SCNC: 138 MMOL/L (ref 135–145)
SODIUM SERPL-SCNC: 140 MMOL/L (ref 135–145)
WBC # BLD AUTO: 4.7 K/UL (ref 4.8–10.8)

## 2021-10-23 PROCEDURE — 83735 ASSAY OF MAGNESIUM: CPT

## 2021-10-23 PROCEDURE — 700102 HCHG RX REV CODE 250 W/ 637 OVERRIDE(OP): Performed by: INTERNAL MEDICINE

## 2021-10-23 PROCEDURE — 36415 COLL VENOUS BLD VENIPUNCTURE: CPT

## 2021-10-23 PROCEDURE — 700102 HCHG RX REV CODE 250 W/ 637 OVERRIDE(OP): Performed by: STUDENT IN AN ORGANIZED HEALTH CARE EDUCATION/TRAINING PROGRAM

## 2021-10-23 PROCEDURE — 99232 SBSQ HOSP IP/OBS MODERATE 35: CPT | Mod: GC | Performed by: INTERNAL MEDICINE

## 2021-10-23 PROCEDURE — 770020 HCHG ROOM/CARE - TELE (206)

## 2021-10-23 PROCEDURE — 700111 HCHG RX REV CODE 636 W/ 250 OVERRIDE (IP): Performed by: STUDENT IN AN ORGANIZED HEALTH CARE EDUCATION/TRAINING PROGRAM

## 2021-10-23 PROCEDURE — 80048 BASIC METABOLIC PNL TOTAL CA: CPT

## 2021-10-23 PROCEDURE — A9270 NON-COVERED ITEM OR SERVICE: HCPCS | Performed by: INTERNAL MEDICINE

## 2021-10-23 PROCEDURE — A9270 NON-COVERED ITEM OR SERVICE: HCPCS | Performed by: STUDENT IN AN ORGANIZED HEALTH CARE EDUCATION/TRAINING PROGRAM

## 2021-10-23 PROCEDURE — 85025 COMPLETE CBC W/AUTO DIFF WBC: CPT

## 2021-10-23 PROCEDURE — 84100 ASSAY OF PHOSPHORUS: CPT | Mod: 91

## 2021-10-23 RX ORDER — LOPERAMIDE HYDROCHLORIDE 2 MG/1
2 CAPSULE ORAL 4 TIMES DAILY PRN
Status: DISCONTINUED | OUTPATIENT
Start: 2021-10-23 | End: 2021-10-23 | Stop reason: ALTCHOICE

## 2021-10-23 RX ORDER — LOPERAMIDE HYDROCHLORIDE 2 MG/1
4 CAPSULE ORAL ONCE
Status: COMPLETED | OUTPATIENT
Start: 2021-10-23 | End: 2021-10-23

## 2021-10-23 RX ORDER — LOPERAMIDE HYDROCHLORIDE 2 MG/1
2 CAPSULE ORAL 4 TIMES DAILY PRN
Status: DISCONTINUED | OUTPATIENT
Start: 2021-10-23 | End: 2021-10-25 | Stop reason: HOSPADM

## 2021-10-23 RX ORDER — MAGNESIUM SULFATE HEPTAHYDRATE 40 MG/ML
2 INJECTION, SOLUTION INTRAVENOUS ONCE
Status: COMPLETED | OUTPATIENT
Start: 2021-10-23 | End: 2021-10-23

## 2021-10-23 RX ADMIN — Medication 400 MG: at 04:24

## 2021-10-23 RX ADMIN — LORAZEPAM 0.5 MG: 0.5 TABLET ORAL at 01:20

## 2021-10-23 RX ADMIN — GABAPENTIN 300 MG: 300 CAPSULE ORAL at 04:24

## 2021-10-23 RX ADMIN — LOPERAMIDE HYDROCHLORIDE 2 MG: 2 CAPSULE ORAL at 20:07

## 2021-10-23 RX ADMIN — GABAPENTIN 300 MG: 300 CAPSULE ORAL at 17:59

## 2021-10-23 RX ADMIN — THERA TABS 1 TABLET: TAB at 04:24

## 2021-10-23 RX ADMIN — Medication 400 MG: at 17:59

## 2021-10-23 RX ADMIN — FLUOXETINE 20 MG: 20 CAPSULE ORAL at 04:24

## 2021-10-23 RX ADMIN — BUSPIRONE HYDROCHLORIDE 15 MG: 10 TABLET ORAL at 04:23

## 2021-10-23 RX ADMIN — ONDANSETRON 4 MG: 4 TABLET, ORALLY DISINTEGRATING ORAL at 01:20

## 2021-10-23 RX ADMIN — LOPERAMIDE HYDROCHLORIDE 2 MG: 2 CAPSULE ORAL at 22:08

## 2021-10-23 RX ADMIN — ONDANSETRON 4 MG: 4 TABLET, ORALLY DISINTEGRATING ORAL at 17:59

## 2021-10-23 RX ADMIN — LOPERAMIDE HYDROCHLORIDE 4 MG: 2 CAPSULE ORAL at 11:50

## 2021-10-23 RX ADMIN — LORAZEPAM 0.5 MG: 0.5 TABLET ORAL at 22:07

## 2021-10-23 RX ADMIN — LORAZEPAM 1 MG: 1 TABLET ORAL at 08:59

## 2021-10-23 RX ADMIN — Medication 100 MG: at 04:24

## 2021-10-23 RX ADMIN — ONDANSETRON 4 MG: 4 TABLET, ORALLY DISINTEGRATING ORAL at 09:02

## 2021-10-23 RX ADMIN — BUSPIRONE HYDROCHLORIDE 15 MG: 10 TABLET ORAL at 17:59

## 2021-10-23 RX ADMIN — LORAZEPAM 0.5 MG: 0.5 TABLET ORAL at 14:11

## 2021-10-23 RX ADMIN — FOLIC ACID 1 MG: 1 TABLET ORAL at 04:24

## 2021-10-23 RX ADMIN — ENOXAPARIN SODIUM 40 MG: 40 INJECTION SUBCUTANEOUS at 04:24

## 2021-10-23 RX ADMIN — OXYCODONE 5 MG: 5 TABLET ORAL at 20:12

## 2021-10-23 RX ADMIN — OXYCODONE 5 MG: 5 TABLET ORAL at 08:59

## 2021-10-23 RX ADMIN — TRAZODONE HYDROCHLORIDE 300 MG: 100 TABLET ORAL at 22:07

## 2021-10-23 RX ADMIN — GABAPENTIN 300 MG: 300 CAPSULE ORAL at 11:51

## 2021-10-23 RX ADMIN — MAGNESIUM SULFATE IN WATER 2 G: 40 INJECTION, SOLUTION INTRAVENOUS at 09:01

## 2021-10-23 RX ADMIN — OMEPRAZOLE 20 MG: 20 CAPSULE, DELAYED RELEASE ORAL at 04:24

## 2021-10-23 RX ADMIN — DIBASIC SODIUM PHOSPHATE, MONOBASIC POTASSIUM PHOSPHATE AND MONOBASIC SODIUM PHOSPHATE 500 MG: 852; 155; 130 TABLET ORAL at 01:19

## 2021-10-23 RX ADMIN — Medication 5 MG: at 22:07

## 2021-10-23 RX ADMIN — ONDANSETRON 4 MG: 4 TABLET, ORALLY DISINTEGRATING ORAL at 14:11

## 2021-10-23 ASSESSMENT — LIFESTYLE VARIABLES
NAUSEA AND VOMITING: NO NAUSEA AND NO VOMITING
AGITATION: NORMAL ACTIVITY
TOTAL SCORE: 2
HEADACHE, FULLNESS IN HEAD: NOT PRESENT
AUDITORY DISTURBANCES: NOT PRESENT
ORIENTATION AND CLOUDING OF SENSORIUM: ORIENTED AND CAN DO SERIAL ADDITIONS
HEADACHE, FULLNESS IN HEAD: NOT PRESENT
TOTAL SCORE: 6
ORIENTATION AND CLOUDING OF SENSORIUM: ORIENTED AND CAN DO SERIAL ADDITIONS
PAROXYSMAL SWEATS: NO SWEAT VISIBLE
ORIENTATION AND CLOUDING OF SENSORIUM: ORIENTED AND CAN DO SERIAL ADDITIONS
AUDITORY DISTURBANCES: NOT PRESENT
ANXIETY: MILDLY ANXIOUS
NAUSEA AND VOMITING: NO NAUSEA AND NO VOMITING
ANXIETY: *
ANXIETY: *
HEADACHE, FULLNESS IN HEAD: NOT PRESENT
AGITATION: NORMAL ACTIVITY
AGITATION: NORMAL ACTIVITY
PAROXYSMAL SWEATS: NO SWEAT VISIBLE
TREMOR: *
TOTAL SCORE: 4
AUDITORY DISTURBANCES: NOT PRESENT
VISUAL DISTURBANCES: NOT PRESENT
TREMOR: *
TOTAL SCORE: 6
AGITATION: NORMAL ACTIVITY
ORIENTATION AND CLOUDING OF SENSORIUM: ORIENTED AND CAN DO SERIAL ADDITIONS
ORIENTATION AND CLOUDING OF SENSORIUM: ORIENTED AND CAN DO SERIAL ADDITIONS
HEADACHE, FULLNESS IN HEAD: NOT PRESENT
AGITATION: NORMAL ACTIVITY
AGITATION: NORMAL ACTIVITY
TREMOR: *
ANXIETY: *
PAROXYSMAL SWEATS: NO SWEAT VISIBLE
TREMOR: TREMOR NOT VISIBLE BUT CAN BE FELT, FINGERTIP TO FINGERTIP
ANXIETY: *
VISUAL DISTURBANCES: NOT PRESENT
TOTAL SCORE: 5
TREMOR: *
PAROXYSMAL SWEATS: NO SWEAT VISIBLE
AUDITORY DISTURBANCES: NOT PRESENT
PAROXYSMAL SWEATS: NO SWEAT VISIBLE
NAUSEA AND VOMITING: MILD NAUSEA WITH NO VOMITING
ORIENTATION AND CLOUDING OF SENSORIUM: ORIENTED AND CAN DO SERIAL ADDITIONS
NAUSEA AND VOMITING: NO NAUSEA AND NO VOMITING
ORIENTATION AND CLOUDING OF SENSORIUM: ORIENTED AND CAN DO SERIAL ADDITIONS
TREMOR: *
TREMOR: *
TOTAL SCORE: 6
ANXIETY: NO ANXIETY (AT EASE)
HEADACHE, FULLNESS IN HEAD: NOT PRESENT
NAUSEA AND VOMITING: NO NAUSEA AND NO VOMITING
VISUAL DISTURBANCES: NOT PRESENT
PAROXYSMAL SWEATS: NO SWEAT VISIBLE
HEADACHE, FULLNESS IN HEAD: NOT PRESENT
AUDITORY DISTURBANCES: NOT PRESENT
AGITATION: NORMAL ACTIVITY
PAROXYSMAL SWEATS: BARELY PERCEPTIBLE SWEATING. PALMS MOIST
AUDITORY DISTURBANCES: NOT PRESENT
VISUAL DISTURBANCES: NOT PRESENT
ANXIETY: MILDLY ANXIOUS
HEADACHE, FULLNESS IN HEAD: VERY MILD
NAUSEA AND VOMITING: NO NAUSEA AND NO VOMITING
VISUAL DISTURBANCES: NOT PRESENT
AUDITORY DISTURBANCES: NOT PRESENT
NAUSEA AND VOMITING: NO NAUSEA AND NO VOMITING
TOTAL SCORE: 2

## 2021-10-23 ASSESSMENT — ENCOUNTER SYMPTOMS
ABDOMINAL PAIN: 1
BLURRED VISION: 0
ORTHOPNEA: 0
NERVOUS/ANXIOUS: 0
PALPITATIONS: 0
WHEEZING: 0
HEARTBURN: 0
COUGH: 0
CONSTIPATION: 0
FEVER: 0
SENSORY CHANGE: 0
DIZZINESS: 0
VOMITING: 0
MYALGIAS: 0
WEAKNESS: 0
BACK PAIN: 0
SHORTNESS OF BREATH: 0
DIARRHEA: 1
NAUSEA: 0
DIAPHORESIS: 0
WEIGHT LOSS: 0
FOCAL WEAKNESS: 0
BLOOD IN STOOL: 0
CHILLS: 0
SORE THROAT: 0
HEMOPTYSIS: 0
HEADACHES: 0
DOUBLE VISION: 0
DEPRESSION: 0

## 2021-10-23 ASSESSMENT — PAIN DESCRIPTION - PAIN TYPE
TYPE: ACUTE PAIN
TYPE: ACUTE PAIN

## 2021-10-23 NOTE — PROGRESS NOTES
COVID surge in effect. Report received from RN. Pt observed needing to use restroom. POC discussed if necessary and all questions answered. Bed locked and in low position, with call light and appropriate belongings within reach. All needs and requirements met at this time.

## 2021-10-23 NOTE — PROGRESS NOTES
"Daily Progress Note:     Date of Service: 10/23/2021  Primary Team: [facility] Blue Team and [facility] Gray Team   Attending: Gael Harris M.D.   Senior Resident: Dr. Penaloza  Intern: Dr. Freeman  Contact:  [phone]    Chief Complaint:   \"Told to come in by my psychiatry\"    Subjective:  No acute events overnight, patient has had multiple (4) episodes of diarrhea including in bed overnight, no watery stool, just loose and brown.  Not described as fatty.  Will give p.o. loperamide.  Patient otherwise doing well with CIWA's of 6 and 5 respectively with 0.5 mg Ativan given yesterday.  Donna is a 41 y.o. female who presented 10/21/2021 with a potassium of 2.3, currently asymptomatic, a 2-month history of poor p.o. intake secondary to nausea and vomiting as well as diarrhea in the setting of chronic alcohol use, without much other p.o. intake.  Patient remains hospitalized for electrolyte repletion.    Patient states that the only complaint is her \"pressure in her abdomen, slightly worse on right upper quadrant but also when left upper quadrant and right lower quadrant.\"  Patient also states that she has had 4 episodes of diarrhea overnight, and that it is difficult for her to get to the bathroom secondary to telemetry and monitoring cords.  Patient states that nausea/vomiting has resolved, and that her only symptoms of alcohol withdrawal include minor tremors.  Patient otherwise doing well.      Consultants/Specialty:  None     Review of Systems:    Review of Systems   Constitutional: Negative for chills, diaphoresis, fever and weight loss.   HENT: Negative for congestion, hearing loss and sore throat.    Eyes: Negative for blurred vision and double vision.   Respiratory: Negative for cough, hemoptysis, shortness of breath and wheezing.    Cardiovascular: Negative for chest pain, palpitations, orthopnea and leg swelling.   Gastrointestinal: Positive for abdominal pain and diarrhea. Negative for blood in stool, " constipation, heartburn, melena, nausea and vomiting.   Genitourinary: Negative for dysuria, frequency, hematuria and urgency.   Musculoskeletal: Negative for back pain, joint pain and myalgias.   Neurological: Negative for dizziness, sensory change, focal weakness, weakness and headaches.   Psychiatric/Behavioral: Negative for depression. The patient is not nervous/anxious.         Objective Data:   Physical Exam:   Vitals:   Temp:  [36 °C (96.8 °F)-36.4 °C (97.5 °F)] 36.3 °C (97.3 °F)  Pulse:  [9-88] 81  Resp:  [16-18] 16  BP: (104-126)/(77-88) 104/78  SpO2:  [92 %-97 %] 94 %     Physical Exam  Constitutional:       General: She is not in acute distress, sitting up in bed.      Appearance: Normal appearance. She is not ill-appearing.   HENT:      Head: Normocephalic and atraumatic.      Right Ear: External ear normal.      Left Ear: External ear normal.      Nose: Nose normal. No congestion or rhinorrhea.      Mouth/Throat:      Mouth: Mucous membranes are moist.      Pharynx: Oropharynx is clear. No oropharyngeal exudate or posterior oropharyngeal erythema.   Eyes:      General:         Right eye: No discharge.      Extraocular Movements: Extraocular movements intact.      Conjunctiva/sclera: Conjunctivae normal.      Pupils: Pupils are equal, round, and reactive to light.   Cardiovascular:      Rate and Rhythm: Normal rate and regular rhythm.      Pulses: Normal pulses.      Heart sounds: Normal heart sounds. No murmur heard.   No friction rub. No gallop.    Pulmonary:      Effort: Pulmonary effort is normal. No respiratory distress.      Breath sounds: Normal breath sounds. No stridor. No wheezing, rhonchi or rales.   Abdominal:      General: Abdomen is flat. Bowel sounds are normal. There is no distension.      Palpations: Abdomen is soft. There is no mass.      Tenderness: There is no abdominal tenderness. There is no guarding.   Musculoskeletal:      Cervical back: Normal range of motion and neck supple. No  tenderness.      Right lower leg: No edema.      Left lower leg: No edema.   Lymphadenopathy:      Cervical: No cervical adenopathy.   Skin:     General: Skin is warm and dry.      Capillary Refill: Capillary refill takes less than 2 seconds.      Coloration: Skin is not jaundiced.      Findings: No lesion or rash.   Neurological:      General: No focal deficit present.      Mental Status: She is alert and oriented to person, place, and time.      Cranial Nerves: No cranial nerve deficit.      Sensory: No sensory deficit.      Motor: No weakness.   Psychiatric:         Mood and Affect: Mood normal.         Behavior: Behavior normal.         Thought Content: Thought content normal.     Labs:   CBC: White blood cell count 4.7, hemoglobin 7.9 stable from yesterday 7.8 will transfuse if less than 7, platelet count 154.  Reticulocyte count 2.1% immature is 30.2%  BMP: BMP remarkable for sodium 140 potassium 4.3 CO2 19, glucose 121, GFR 58 creatinine 1.23, phosphorus 4.7, magnesium 1.7 replete magnesium  Imaging:   No new imaging  Problem Representation:  Ms. Morales is a pleasant 41-year-old female with a past medical history significant for chronic alcohol use complicated by withdrawals with seizures, pancreatitis, likely alcoholic gastritis, chronic normocytic anemia, with iron deficiency in the past as well as a history of pancytopenia secondary to alcohol use, presenting for hypokalemia with nausea and vomiting, and alcohol use now K repleted, still hypomagnesemia and on CIWA for ETOH withdraw.     Hypokalemia  Assessment & Plan  Hypokalemia likely in the setting of chronic alcohol use disorder as well as diarrhea and nausea and vomiting, obtained urine potassium which was 6, less likely intrinsic renal problem and more likely secondary to dilution via low osmotic fluid and alcohol use and underlying nausea and vomiting.  -Patient placed on telemetry for continued monitoring for arrhythmias  -Labs: BMP, MG phos daily    -Recheck K might need daily K though now wnl's likely some intrinsic GI loss.   -We treat symptomatically with Zofran    Diarrhea  Assessment & Plan  Patient with 2-month history of loose [employer] [employer] scale 6 bowel movements 4 times daily on average, while patient previously had well formed stools 1 time daily in the past.  Stools are not loose watery, not brown, no inciting events or recent travel, low concern for infectious etiology, possible pancreatic etiology versus osmotic diarrhea secondary to alcohol use and poor p.o. intake.  Likely contributing to metabolic abnormalities.  No complaints at this time.  -Loperamide    Nausea & vomiting  Assessment & Plan  Patient with nausea and vomiting possibly related to alcoholic pancreatitis, has had in the past, blood glucose slightly elevated at 130, patient endorsing preop previous EGD, though no records on file, denies any symptoms of GERD, on physical exam no right upper quadrant tenderness to palpation, no Cooper sign.  No left upper quadrant tenderness as well, no radiation of pain.  Likely secondary to alcohol use versus alcoholic pancreatitis.  Lipase not elevated, history of cholecystectomy.  Diffuse cramping pain likely secondary to diarrhea.  Imaging: None at this time next  Therapeutic: Zofran scheduled      Adjustment disorder with mixed anxiety and depressed mood- (present on admission)  Assessment & Plan  Continue patient's home medications, currently followed by psychiatry.    Chronic insomnia- (present on admission)  Assessment & Plan  Continue patient's home medications, trazodone and melatonin    Anxiety disorder, unspecified- (present on admission)  Assessment & Plan  Continue patient's home medications    Alcohol use disorder, severe, dependence (HCC)- (present on admission)  Assessment & Plan  Patient with history of chronic alcohol use, without known cirrhosis, transaminitis present, pancytopenia also present, history of withdrawal patient  with last drink today.  Patient with family history of alcohol use disorder.  Patient does not wish to stop at this time.  CIWA is low, only requiring small amounts of Ativan in addition to gabapentin.  -Continue to follow liver function tests, continue to replete underlying metabolic abnormalities as above  -CIWA protocol, fall, aspiration protocols  -Gabapentin 300 mg 3 times daily for seizure prophylaxis  -Counseled patient on alcohol cessation, discussed the deleterious effects of alcohol on her underlying illnesses such as hypokalemia, other unspecified metabolic abnormalities, pancytopenia as well as nausea and vomiting  -Oral thiamine        Hypomagnesemia- (present on admission)  Assessment & Plan  Current magnesium of 1.7 likely secondary to nutritional deficiencies as chronic alcoholism  -Replete IV and oral magnesium  -Monitor on labs    High anion gap metabolic acidosis- (present on admission)  Assessment & Plan  High anion gap metabolic acidosis likely secondary to lactic acidosis in the setting of dehydration and poor p.o. intake versus starvation ketosis given poor p.o. intake and alcohol use.  -IV fluids to replete  -Fixed underlying medical conditions      Transaminitis- (present on admission)  Assessment & Plan  AST to ALT greater than 2-1 predominance without cholestatic pattern consistent with alcohol use.  -We will continue to follow on CMP    Anemia- (present on admission)  Assessment & Plan  Patient with hemoglobin of 9.8 on admission 7.8 yesterday likely 2/2 hemodilution, patient with a history of iron deficiency anemia per chart and per patient, patient was never received prescription for oral iron, and given her nausea and vomiting likely would not of tolerated.  Likely multifactorial in origin given chronic alcohol use and possible underlying liver damage.  -Likely multifactorial in origin, supplement vitamins and iron given results of laboratory testing.  -Consider IV iron  supplementation.     DVT: Lovenox  CODE STATUS: Full code

## 2021-10-23 NOTE — CARE PLAN
Problem: Knowledge Deficit - Standard  Goal: Patient and family/care givers will demonstrate understanding of plan of care, disease process/condition, diagnostic tests and medications  Outcome: Progressing     Problem: Optimal Care for Alcohol Withdrawal  Goal: Optimal Care for the alcohol withdrawal patient  Outcome: Progressing     The patient is Watcher - Medium risk of patient condition declining or worsening    Shift Goals  Clinical Goals: [facility]  Patient Goals: ambulate safely       Patient is not progressing towards the following goals:      Problem: Psychosocial  Goal: Patient's level of anxiety will decrease  Outcome: Not Progressing

## 2021-10-23 NOTE — PROGRESS NOTES
[facility] [place] [facility]NAL MEDICINE ATTENDING NOTE:   Gael Harris MD      Visit Time:   Attending/resident bedside rounds 9-11:30 AM     PATIENT ID  Name:             Donna Morales   YOB: 1980  Age:                 41 y.o.  female   MRN:               [id number]  Admit:  10/21/2021     I saw and examined the patient and discussed the management with the resident staff.  I reviewed the resident's note and agree with the resident's findings and plan as documented in the resident's note except as documented in the attending note. Please reference resident daily note for complete information.    The chart was reviewed and summarized.  Available labs, imaging, O2 sats ,  EKGs were reviewed. Available nursing, consultant, and resident notes were reviewed. I am actively involved in the patient's care.                                                                              ______________________________________________________________________            41(  admit Oct 21  )  INTERVAL:  Chart reviewed/summarized,       Oct 23AM: VSS, HR 80, BP 100s, 92% RA  alert, minimal anxiety, RUQ pain persists, not progressive, eating OK,mild nausea, ambulatory, MG still low, K and PHOS better -- continue maintenance replacement , continue gabapentin, possible DC In 1-2 days (DAVID mostly )   MG 1.7, PHOS 4.8 (post IV, PO), K 4.3, CO2 19, CR 1.23, Dgap 3  EKG: QTc 466, SR      MEDR:  buspar, enox LD, proac 20mg, gabapentin 300 TID, MG, melatonin, PPI, zofran , trazodone      MG 2.2, K 4, CR 1.10, CA 7.6, PHOS 2-2.22, WBC 3.6, HB 7.8,       Oct 22AM: VSS, , HR 80, has required few doses of ATIVAN, BP, HR stable currently   minimal DAVID, alert, lungs clear, not hyperadrenergic, K, MG better, abd soft - continue on CIWA, incr gabapentin if needed , K, MG, PHOS replacement   WBC 3.6, HB 7.8 (10), , K 4.4 (2.6),  , GFR 48 , phos 2.2     admit from ER with K severely low 2.3, complications of  ETOH use, poor PO intake, no NVD, no diuretics, no DM2 , ETOH hx      OCT 21: AF, HR , 95% RA, 71KG     OPM: trazodone, melatonin, trazodone, prozadc 20mg, buspar     CORE:  Code Status (  FULL  --------------------------------------------------------------------------------------------------  Hospital Summary/ Patient System Review      NP:   *admit(    Impression: KERRIE: trazadone, prozac, buspar use ,  Impressoin: hx ETOH use  - c/b fatty liver, pancreatitis, DAVID  - thiamine, CIWA/gabapentin   Impression: DAVID  -- thiamine, gabapentin, CIWA, K, MG , fluid repair      EENT:   *admit(       MSK/PAIN:   *admit(       CVS:   *admit(  ,   EKG: QTc 476, SR      PULM:   *admit(       GI:   *admit(  BMI 26, AST 184H, ALT 50, ALP 104H, BR 0.9, ALB 4.2,  33% FE, lipase 7  ImpressioN: hepatitis, esophagitis, gastritis due to ETOH  - PPI   Impression: Bx (2006) mod fatty change/no fibrosis,  hx cholecystectomy     2019 CT w/o: RK pyelo ? , no abscess, fatty enlargement of liver , fibrodis, chronic pancreatitis , US: no ascteis, hepatopetal flow, liver 11cm, no ascites , post lissa     :   *admit(  UA 2-5 WBC, 0-2 RBC     RENAL:   *admit(  Na 137, K 2.3, CO2 19, dgap 8, , Bun 17, c R1.25 (1.04) , CA 9.2, ALB 4.2, UK 6 (appropriately low) , MG 1.4L   Impression: HAGMA with severely low KCL, poor nutrition, GI loss , low MG , ETOH ketoacidosis   , PO/IV replacement MG -> improved   Impression: prerenal ANEL, poor PO intake, > reassess after fluids  - resolving      HEME:   *admit(  WBC 5.9, HB 9.8, PTL 143L, monos 920H, basophils 160H , macrocytosis , foatel 6, B12 1003, FT4 0.66, FT3 wnl , Retic 2.1, Ferritin 361   Impression: macrocytic anemia, nutritional/ETOH/liver pathology   --> Retic, B12, Ferritin, stool heme   Impression: pancytopenia, toxic      ENDO:   *admit(  FT 0.94   Impression: hx euthyroid sick, recheck FT4      July 2021:  FT4 0.66L , TSH 1.320 , FT3 2.03      DERM/BREAST:   *admit(        ID:   *admit(

## 2021-10-24 LAB
ANION GAP SERPL CALC-SCNC: 13 MMOL/L (ref 7–16)
BUN SERPL-MCNC: 7 MG/DL (ref 8–22)
CALCIUM SERPL-MCNC: 8.4 MG/DL (ref 8.5–10.5)
CHLORIDE SERPL-SCNC: 107 MMOL/L (ref 96–112)
CO2 SERPL-SCNC: 20 MMOL/L (ref 20–33)
CREAT SERPL-MCNC: 0.98 MG/DL (ref 0.5–1.4)
ERYTHROCYTE [DISTWIDTH] IN BLOOD BY AUTOMATED COUNT: 91.9 FL (ref 35.9–50)
GLUCOSE SERPL-MCNC: 128 MG/DL (ref 65–99)
HCT VFR BLD AUTO: 27.1 % (ref 37–47)
HGB BLD-MCNC: 8.5 G/DL (ref 12–16)
MAGNESIUM SERPL-MCNC: 1.8 MG/DL (ref 1.5–2.5)
MCH RBC QN AUTO: 28.3 PG (ref 27–33)
MCHC RBC AUTO-ENTMCNC: 31.4 G/DL (ref 33.6–35)
MCV RBC AUTO: 90.3 FL (ref 81.4–97.8)
PHOSPHATE SERPL-MCNC: 2.7 MG/DL (ref 2.5–4.5)
PLATELET # BLD AUTO: 162 K/UL (ref 164–446)
PMV BLD AUTO: 10 FL (ref 9–12.9)
POTASSIUM SERPL-SCNC: 3.8 MMOL/L (ref 3.6–5.5)
RBC # BLD AUTO: 3 M/UL (ref 4.2–5.4)
SODIUM SERPL-SCNC: 140 MMOL/L (ref 135–145)
VIT B1 BLD-MCNC: 50 NMOL/L (ref 70–180)
WBC # BLD AUTO: 3.4 K/UL (ref 4.8–10.8)

## 2021-10-24 PROCEDURE — 700102 HCHG RX REV CODE 250 W/ 637 OVERRIDE(OP): Performed by: STUDENT IN AN ORGANIZED HEALTH CARE EDUCATION/TRAINING PROGRAM

## 2021-10-24 PROCEDURE — A9270 NON-COVERED ITEM OR SERVICE: HCPCS | Performed by: STUDENT IN AN ORGANIZED HEALTH CARE EDUCATION/TRAINING PROGRAM

## 2021-10-24 PROCEDURE — 99232 SBSQ HOSP IP/OBS MODERATE 35: CPT | Mod: GC | Performed by: INTERNAL MEDICINE

## 2021-10-24 PROCEDURE — 700111 HCHG RX REV CODE 636 W/ 250 OVERRIDE (IP): Performed by: STUDENT IN AN ORGANIZED HEALTH CARE EDUCATION/TRAINING PROGRAM

## 2021-10-24 PROCEDURE — 80048 BASIC METABOLIC PNL TOTAL CA: CPT

## 2021-10-24 PROCEDURE — 83735 ASSAY OF MAGNESIUM: CPT

## 2021-10-24 PROCEDURE — 770020 HCHG ROOM/CARE - TELE (206)

## 2021-10-24 PROCEDURE — 700102 HCHG RX REV CODE 250 W/ 637 OVERRIDE(OP): Performed by: INTERNAL MEDICINE

## 2021-10-24 PROCEDURE — A9270 NON-COVERED ITEM OR SERVICE: HCPCS | Performed by: INTERNAL MEDICINE

## 2021-10-24 PROCEDURE — 85027 COMPLETE CBC AUTOMATED: CPT

## 2021-10-24 PROCEDURE — 84100 ASSAY OF PHOSPHORUS: CPT

## 2021-10-24 PROCEDURE — 36415 COLL VENOUS BLD VENIPUNCTURE: CPT

## 2021-10-24 RX ORDER — CALCIUM CARBONATE 500 MG/1
1000 TABLET, CHEWABLE ORAL ONCE
Status: COMPLETED | OUTPATIENT
Start: 2021-10-24 | End: 2021-10-24

## 2021-10-24 RX ADMIN — BUSPIRONE HYDROCHLORIDE 15 MG: 10 TABLET ORAL at 04:08

## 2021-10-24 RX ADMIN — Medication 400 MG: at 04:08

## 2021-10-24 RX ADMIN — THERA TABS 1 TABLET: TAB at 04:08

## 2021-10-24 RX ADMIN — LOPERAMIDE HYDROCHLORIDE 2 MG: 2 CAPSULE ORAL at 21:21

## 2021-10-24 RX ADMIN — ENOXAPARIN SODIUM 40 MG: 40 INJECTION SUBCUTANEOUS at 04:09

## 2021-10-24 RX ADMIN — ONDANSETRON 4 MG: 4 TABLET, ORALLY DISINTEGRATING ORAL at 14:03

## 2021-10-24 RX ADMIN — OXYCODONE 5 MG: 5 TABLET ORAL at 21:21

## 2021-10-24 RX ADMIN — CALCIUM CARBONATE 1000 MG: 500 TABLET, CHEWABLE ORAL at 07:53

## 2021-10-24 RX ADMIN — OMEPRAZOLE 20 MG: 20 CAPSULE, DELAYED RELEASE ORAL at 04:08

## 2021-10-24 RX ADMIN — LORAZEPAM 0.5 MG: 0.5 TABLET ORAL at 08:02

## 2021-10-24 RX ADMIN — POTASSIUM BICARBONATE 50 MEQ: 978 TABLET, EFFERVESCENT ORAL at 07:53

## 2021-10-24 RX ADMIN — ONDANSETRON 4 MG: 4 TABLET, ORALLY DISINTEGRATING ORAL at 17:10

## 2021-10-24 RX ADMIN — FLUOXETINE 20 MG: 20 CAPSULE ORAL at 04:08

## 2021-10-24 RX ADMIN — Medication 100 MG: at 04:08

## 2021-10-24 RX ADMIN — FOLIC ACID 1 MG: 1 TABLET ORAL at 04:09

## 2021-10-24 RX ADMIN — TRAZODONE HYDROCHLORIDE 300 MG: 100 TABLET ORAL at 21:12

## 2021-10-24 RX ADMIN — GABAPENTIN 300 MG: 300 CAPSULE ORAL at 11:56

## 2021-10-24 RX ADMIN — Medication 5 MG: at 21:11

## 2021-10-24 RX ADMIN — BUSPIRONE HYDROCHLORIDE 15 MG: 10 TABLET ORAL at 17:10

## 2021-10-24 RX ADMIN — LOPERAMIDE HYDROCHLORIDE 2 MG: 2 CAPSULE ORAL at 07:53

## 2021-10-24 RX ADMIN — GABAPENTIN 300 MG: 300 CAPSULE ORAL at 04:09

## 2021-10-24 RX ADMIN — Medication 400 MG: at 17:10

## 2021-10-24 ASSESSMENT — LIFESTYLE VARIABLES
TREMOR: NO TREMOR
HEADACHE, FULLNESS IN HEAD: NOT PRESENT
TOTAL SCORE: 1
PAROXYSMAL SWEATS: NO SWEAT VISIBLE
NAUSEA AND VOMITING: NO NAUSEA AND NO VOMITING
ANXIETY: *
HEADACHE, FULLNESS IN HEAD: NOT PRESENT
AUDITORY DISTURBANCES: NOT PRESENT
ANXIETY: MILDLY ANXIOUS
ORIENTATION AND CLOUDING OF SENSORIUM: ORIENTED AND CAN DO SERIAL ADDITIONS
ANXIETY: NO ANXIETY (AT EASE)
AGITATION: NORMAL ACTIVITY
ANXIETY: *
TREMOR: *
NAUSEA AND VOMITING: NO NAUSEA AND NO VOMITING
AGITATION: NORMAL ACTIVITY
AUDITORY DISTURBANCES: NOT PRESENT
ORIENTATION AND CLOUDING OF SENSORIUM: ORIENTED AND CAN DO SERIAL ADDITIONS
HEADACHE, FULLNESS IN HEAD: NOT PRESENT
ORIENTATION AND CLOUDING OF SENSORIUM: ORIENTED AND CAN DO SERIAL ADDITIONS
AUDITORY DISTURBANCES: NOT PRESENT
NAUSEA AND VOMITING: NO NAUSEA AND NO VOMITING
VISUAL DISTURBANCES: NOT PRESENT
PAROXYSMAL SWEATS: NO SWEAT VISIBLE
VISUAL DISTURBANCES: NOT PRESENT
ORIENTATION AND CLOUDING OF SENSORIUM: ORIENTED AND CAN DO SERIAL ADDITIONS
HEADACHE, FULLNESS IN HEAD: NOT PRESENT
HEADACHE, FULLNESS IN HEAD: NOT PRESENT
VISUAL DISTURBANCES: NOT PRESENT
AGITATION: NORMAL ACTIVITY
TOTAL SCORE: 3
TREMOR: NO TREMOR
PAROXYSMAL SWEATS: NO SWEAT VISIBLE
AUDITORY DISTURBANCES: NOT PRESENT
TREMOR: *
ANXIETY: MILDLY ANXIOUS
PAROXYSMAL SWEATS: NO SWEAT VISIBLE
TREMOR: NO TREMOR
TOTAL SCORE: 1
PAROXYSMAL SWEATS: NO SWEAT VISIBLE
VISUAL DISTURBANCES: NOT PRESENT
VISUAL DISTURBANCES: NOT PRESENT
HEADACHE, FULLNESS IN HEAD: NOT PRESENT
AUDITORY DISTURBANCES: NOT PRESENT
AGITATION: NORMAL ACTIVITY
VISUAL DISTURBANCES: NOT PRESENT
ANXIETY: MILDLY ANXIOUS
TREMOR: TREMOR NOT VISIBLE BUT CAN BE FELT, FINGERTIP TO FINGERTIP
NAUSEA AND VOMITING: MILD NAUSEA WITH NO VOMITING
AGITATION: NORMAL ACTIVITY
NAUSEA AND VOMITING: NO NAUSEA AND NO VOMITING
PAROXYSMAL SWEATS: NO SWEAT VISIBLE
TOTAL SCORE: 4
VISUAL DISTURBANCES: NOT PRESENT
NAUSEA AND VOMITING: NO NAUSEA AND NO VOMITING
PAROXYSMAL SWEATS: NO SWEAT VISIBLE
ORIENTATION AND CLOUDING OF SENSORIUM: ORIENTED AND CAN DO SERIAL ADDITIONS
HEADACHE, FULLNESS IN HEAD: NOT PRESENT
ORIENTATION AND CLOUDING OF SENSORIUM: ORIENTED AND CAN DO SERIAL ADDITIONS
TREMOR: *
ORIENTATION AND CLOUDING OF SENSORIUM: ORIENTED AND CAN DO SERIAL ADDITIONS
ANXIETY: MILDLY ANXIOUS
TOTAL SCORE: 5
TOTAL SCORE: 1
NAUSEA AND VOMITING: NO NAUSEA AND NO VOMITING
AGITATION: NORMAL ACTIVITY
AGITATION: NORMAL ACTIVITY
TOTAL SCORE: 2
AUDITORY DISTURBANCES: NOT PRESENT
AUDITORY DISTURBANCES: NOT PRESENT

## 2021-10-24 ASSESSMENT — ENCOUNTER SYMPTOMS
VOMITING: 0
FOCAL WEAKNESS: 0
CONSTIPATION: 0
BLOOD IN STOOL: 0
DOUBLE VISION: 0
COUGH: 0
WHEEZING: 0
WEIGHT LOSS: 0
DEPRESSION: 0
PALPITATIONS: 0
SHORTNESS OF BREATH: 0
NAUSEA: 0
HEADACHES: 0
HEMOPTYSIS: 0
WEAKNESS: 0
SORE THROAT: 0
ABDOMINAL PAIN: 1
NERVOUS/ANXIOUS: 0
DIARRHEA: 0
ORTHOPNEA: 0
FEVER: 0
DIZZINESS: 0
BLURRED VISION: 0
DIAPHORESIS: 0
BACK PAIN: 0
MYALGIAS: 0
SENSORY CHANGE: 0
CHILLS: 0
HEARTBURN: 0

## 2021-10-24 ASSESSMENT — PAIN DESCRIPTION - PAIN TYPE
TYPE: ACUTE PAIN
TYPE: ACUTE PAIN;CHRONIC PAIN

## 2021-10-24 ASSESSMENT — FIBROSIS 4 INDEX: FIB4 SCORE: 6.99

## 2021-10-24 NOTE — PROGRESS NOTES
"Daily Progress Note:     Date of Service: 10/24/2021  Primary Team: R  Blue Team and [facility] Gray Team   Attending: Gael Harris M.D.   Senior Resident: Dr. Penaloza  Intern: Dr. Freeman  Contact:  [phone]    Chief Complaint:   \"Told to come in by my psychiatry\"    Subjective:  No acute events overnight, nursing requesting clearance for patient to come off of telemetry.  Not described as fatty.  Will continue to give p.o. loperamide.  Patient otherwise doing well with CIWA's of 6 and 5 respectively with 0.5 mg Ativan given yesterday.  Donna is a 41 y.o. female who presented 10/21/2021 with a potassium of 2.3, currently asymptomatic, a 2-month history of poor p.o. intake secondary to nausea and vomiting as well as diarrhea in the setting of chronic alcohol use, without much other p.o. intake.  Patient remains hospitalized for electrolyte repletion.    Patient states that her overnight complaints include \"pressure in her abdomen, which was better with the oxycodone.\"  Patient also states that diarrhea has improved, only tremors for withdrawal symptoms and patient had trouble sleeping overnight.  Patient states that nausea/vomiting has resolved, and that her only symptoms of alcohol withdrawal include minor tremors.  Patient otherwise doing well.       Consultants/Specialty:  None     Review of Systems:    Review of Systems   Constitutional: Negative for chills, diaphoresis, fever and weight loss.   HENT: Negative for congestion, hearing loss and sore throat.    Eyes: Negative for blurred vision and double vision.   Respiratory: Negative for cough, hemoptysis, shortness of breath and wheezing.    Cardiovascular: Negative for chest pain, palpitations, orthopnea and leg swelling.   Gastrointestinal: Positive for abdominal pain. Negative for blood in stool, constipation, diarrhea, heartburn, melena, nausea and vomiting.   Genitourinary: Negative for dysuria, frequency, hematuria and urgency. "   Musculoskeletal: Negative for back pain, joint pain and myalgias.   Neurological: Negative for dizziness, sensory change, focal weakness, weakness and headaches.   Psychiatric/Behavioral: Negative for depression. The patient is not nervous/anxious.         Objective Data:   Physical Exam:   Vitals:   Temp:  [36.3 °C (97.3 °F)-37.1 °C (98.8 °F)] 37.1 °C (98.8 °F)  Pulse:  [80-88] 88  Resp:  [16] 16  BP: (104-132)/(72-91) 127/88  SpO2:  [92 %-95 %] 94 %     Physical Exam  Constitutional:       General: She is not in acute distress, sitting up in bed.      Appearance: Normal appearance. She is not ill-appearing.   HENT:      Head: Normocephalic and atraumatic.      Right Ear: External ear normal.      Left Ear: External ear normal.      Nose: Nose normal. No congestion or rhinorrhea.      Mouth/Throat:      Mouth: Mucous membranes are moist.      Pharynx: Oropharynx is clear. No oropharyngeal exudate or posterior oropharyngeal erythema.   Eyes:      General:         Right eye: No discharge.      Extraocular Movements: Extraocular movements intact.      Conjunctiva/sclera: Conjunctivae normal.      Pupils: Pupils are equal, round, and reactive to light.   Cardiovascular:      Rate and Rhythm: Normal rate and regular rhythm.      Pulses: Normal pulses.      Heart sounds: Normal heart sounds. No murmur heard.   No friction rub. No gallop.    Pulmonary:      Effort: Pulmonary effort is normal. No respiratory distress.      Breath sounds: Normal breath sounds. No stridor. No wheezing, rhonchi or rales.   Abdominal:      General: Abdomen is flat. Bowel sounds are normal. There is no distension.      Palpations: Abdomen is soft. There is no mass.      Tenderness: There is no abdominal tenderness. There is no guarding.   Musculoskeletal:      Cervical back: Normal range of motion and neck supple. No tenderness.      Right lower leg: No edema.      Left lower leg: No edema.   Lymphadenopathy:      Cervical: No cervical  adenopathy.   Skin:     General: Skin is warm and dry.      Capillary Refill: Capillary refill takes less than 2 seconds.      Coloration: Skin is not jaundiced.      Findings: No lesion or rash.   Neurological:      General: No focal deficit present.      Mental Status: She is alert and oriented to person, place, and time.      Cranial Nerves: No cranial nerve deficit.      Sensory: No sensory deficit.      Motor: No weakness.   Psychiatric:         Mood and Affect: Mood normal.         Behavior: Behavior normal.         Thought Content: Thought content normal.     Labs:   CBC WBC 3.4, hemoglobin 8.5, platelets 162  Sodium 140 potassium 3.8 replete, chloride 107, bicarb 20 improving, creatinine 0.98 improving GFR greater than 60, magnesium 1.8 replete    Imaging:   No new imaging    Problem Representation:  Ms. Morales is a pleasant 41-year-old female with a past medical history significant for chronic alcohol use complicated by withdrawals with seizures, pancreatitis, likely alcoholic gastritis, chronic normocytic anemia, with iron deficiency in the past as well as a history of pancytopenia secondary to alcohol use, presenting for hypokalemia with nausea and vomiting, and alcohol use now going through withdrawals as patient wants to quit, will monitor for 1 more day then likely discharge in a.m. tomorrow.     Hypokalemia  Assessment & Plan  Hypokalemia likely in the setting of chronic alcohol use disorder as well as diarrhea and nausea and vomiting, obtained urine potassium which was 6, less likely intrinsic renal problem and more likely secondary to dilution via low osmotic fluid and alcohol use and underlying nausea and vomiting.  -Patient placed on telemetry for continued monitoring for arrhythmias  -Labs: BMP, MG phos daily   -Recheck K might need daily K though now wnl's likely some intrinsic GI loss.   -We treat symptomatically with Zofran    Diarrhea  Assessment & Plan  Patient with 2-month history of loose  [employer] [employer] scale 6 bowel movements 4 times daily on average, while patient previously had well formed stools 1 time daily in the past.  Stools are not loose watery, not brown, no inciting events or recent travel, low concern for infectious etiology, possible pancreatic etiology versus osmotic diarrhea secondary to alcohol use and poor p.o. intake.  Likely contributing to metabolic abnormalities. Improving.  -Loperamide    Nausea & vomiting  Assessment & Plan  Patient with nausea and vomiting possibly related to alcoholic pancreatitis, has had in the past, blood glucose slightly elevated at 130, patient endorsing preop previous EGD, though no records on file, denies any symptoms of GERD, on physical exam no right upper quadrant tenderness to palpation, no Cooper sign.  No left upper quadrant tenderness as well, no radiation of pain.  Likely secondary to alcohol use versus alcoholic pancreatitis.  Lipase not elevated, history of cholecystectomy.  Diffuse cramping pain likely secondary to diarrhea.  Imaging: None at this time next  Therapeutic: Zofran       Adjustment disorder with mixed anxiety and depressed mood- (present on admission)  Assessment & Plan  Continue patient's home medications, currently followed by psychiatry.    Chronic insomnia- (present on admission)  Assessment & Plan  Continue patient's home medications, trazodone and melatonin    Anxiety disorder, unspecified- (present on admission)  Assessment & Plan  Continue patient's home medications    Alcohol use disorder, severe, dependence (HCC)- (present on admission)  Assessment & Plan  Patient with history of chronic alcohol use, without known cirrhosis, transaminitis present, pancytopenia also present, history of withdrawal patient with last drink today.  Patient with family history of alcohol use disorder.  Patient does wish to stop at this time.  CIWA is low, only requiring small amounts of Ativan in addition to gabapentin.  -Continue to follow  liver function tests, continue to replete underlying metabolic abnormalities as above  -CIWA protocol, fall, aspirtion protocolsa  -Gabapentin 300 mg 3 times daily for seizure prophylaxis  -Counseled patient on alcohol cessation, discussed the deleterious effects of alcohol on her underlying illnesses such as hypokalemia, other unspecified metabolic abnormalities, pancytopenia as well as nausea and vomiting  -Oral thiamine         Hypomagnesemia- (present on admission)  Assessment & Plan  Current magnesium of 1.8 likely secondary to nutritional deficiencies as chronic alcoholism  -Replete IV and oral magnesium  -Monitor on labs    High anion gap metabolic acidosis- (present on admission)  Assessment & Plan  Resolving  High anion gap metabolic acidosis likely secondary to lactic acidosis in the setting of dehydration and poor p.o. intake versus starvation ketosis given poor p.o. intake and alcohol use.  -IV fluids to replete  -Fixed underlying medical conditions      Transaminitis- (present on admission)  Assessment & Plan  AST to ALT greater than 2-1 predominance without cholestatic pattern consistent with alcohol use.  -We will continue to follow on CMP    Anemia- (present on admission)  Assessment & Plan  Patient with hemoglobin of 9.8 on admission 7.8 yesterday likely 2/2 hemodilution, patient with a history of iron deficiency anemia per chart and per patient, patient was never received prescription for oral iron, and given her nausea and vomiting likely would not of tolerated.  Likely multifactorial in origin given chronic alcohol use and possible underlying liver damage.  -Likely multifactorial in origin, supplement vitamins and iron given results of laboratory testing.  -Consider IV iron supplementation.     DVT: Lovenox  CODE STATUS: Full code

## 2021-10-24 NOTE — PROGRESS NOTES
COVID surge in effect. Report received from RN. Pt observed asking to use restroom, imodium given to prevent diarrhea. POC discussed if necessary and all questions answered. Bed locked and in low position, with call light and appropriate belongings within reach. All needs and requirements met at this time.

## 2021-10-24 NOTE — CARE PLAN
Problem: Knowledge Deficit - Standard  Goal: Patient and family/care givers will demonstrate understanding of plan of care, disease process/condition, diagnostic tests and medications  Outcome: Progressing     Problem: Optimal Care for Alcohol Withdrawal  Goal: Optimal Care for the alcohol withdrawal patient  Outcome: Progressing   The patient is Watcher - Medium risk of patient condition declining or worsening    Shift Goals  Clinical Goals: bowel care, imodium  Patient Goals: sleep    Patient is not progressing towards the following goals:      Problem: Psychosocial  Goal: Patient's level of anxiety will decrease  Outcome: Not Progressing

## 2021-10-24 NOTE — PROGRESS NOTES
Handoff provided to JOSHUA Jha. Poc/updates provided. Pt in bed in lowest position/locked and call light in reach

## 2021-10-25 VITALS
HEIGHT: 64 IN | SYSTOLIC BLOOD PRESSURE: 137 MMHG | HEART RATE: 77 BPM | DIASTOLIC BLOOD PRESSURE: 95 MMHG | RESPIRATION RATE: 18 BRPM | TEMPERATURE: 98.5 F | WEIGHT: 183.42 LBS | BODY MASS INDEX: 31.31 KG/M2 | OXYGEN SATURATION: 93 %

## 2021-10-25 LAB
ANION GAP SERPL CALC-SCNC: 11 MMOL/L (ref 7–16)
BUN SERPL-MCNC: 5 MG/DL (ref 8–22)
CALCIUM SERPL-MCNC: 9 MG/DL (ref 8.5–10.5)
CHLORIDE SERPL-SCNC: 104 MMOL/L (ref 96–112)
CO2 SERPL-SCNC: 23 MMOL/L (ref 20–33)
CREAT SERPL-MCNC: 1.12 MG/DL (ref 0.5–1.4)
GLUCOSE SERPL-MCNC: 118 MG/DL (ref 65–99)
MAGNESIUM SERPL-MCNC: 1.3 MG/DL (ref 1.5–2.5)
POTASSIUM SERPL-SCNC: 4.8 MMOL/L (ref 3.6–5.5)
SODIUM SERPL-SCNC: 138 MMOL/L (ref 135–145)

## 2021-10-25 PROCEDURE — 83735 ASSAY OF MAGNESIUM: CPT

## 2021-10-25 PROCEDURE — 700102 HCHG RX REV CODE 250 W/ 637 OVERRIDE(OP): Performed by: STUDENT IN AN ORGANIZED HEALTH CARE EDUCATION/TRAINING PROGRAM

## 2021-10-25 PROCEDURE — A9270 NON-COVERED ITEM OR SERVICE: HCPCS | Performed by: STUDENT IN AN ORGANIZED HEALTH CARE EDUCATION/TRAINING PROGRAM

## 2021-10-25 PROCEDURE — 99239 HOSP IP/OBS DSCHRG MGMT >30: CPT | Mod: GC | Performed by: INTERNAL MEDICINE

## 2021-10-25 PROCEDURE — 700111 HCHG RX REV CODE 636 W/ 250 OVERRIDE (IP): Performed by: STUDENT IN AN ORGANIZED HEALTH CARE EDUCATION/TRAINING PROGRAM

## 2021-10-25 PROCEDURE — 700102 HCHG RX REV CODE 250 W/ 637 OVERRIDE(OP): Performed by: INTERNAL MEDICINE

## 2021-10-25 PROCEDURE — A9270 NON-COVERED ITEM OR SERVICE: HCPCS | Performed by: INTERNAL MEDICINE

## 2021-10-25 PROCEDURE — 80048 BASIC METABOLIC PNL TOTAL CA: CPT

## 2021-10-25 RX ORDER — MAGNESIUM SULFATE HEPTAHYDRATE 40 MG/ML
4 INJECTION, SOLUTION INTRAVENOUS ONCE
Status: COMPLETED | OUTPATIENT
Start: 2021-10-25 | End: 2021-10-25

## 2021-10-25 RX ORDER — LOPERAMIDE HYDROCHLORIDE 2 MG/1
2 CAPSULE ORAL 4 TIMES DAILY PRN
Qty: 60 CAPSULE | Refills: 0 | Status: SHIPPED | OUTPATIENT
Start: 2021-10-25 | End: 2021-11-09

## 2021-10-25 RX ADMIN — ENOXAPARIN SODIUM 40 MG: 40 INJECTION SUBCUTANEOUS at 04:37

## 2021-10-25 RX ADMIN — BUSPIRONE HYDROCHLORIDE 15 MG: 10 TABLET ORAL at 04:36

## 2021-10-25 RX ADMIN — LOPERAMIDE HYDROCHLORIDE 2 MG: 2 CAPSULE ORAL at 08:24

## 2021-10-25 RX ADMIN — OXYCODONE 5 MG: 5 TABLET ORAL at 08:24

## 2021-10-25 RX ADMIN — FOLIC ACID 1 MG: 1 TABLET ORAL at 04:36

## 2021-10-25 RX ADMIN — Medication 100 MG: at 04:37

## 2021-10-25 RX ADMIN — Medication 400 MG: at 04:37

## 2021-10-25 RX ADMIN — OMEPRAZOLE 20 MG: 20 CAPSULE, DELAYED RELEASE ORAL at 04:37

## 2021-10-25 RX ADMIN — THERA TABS 1 TABLET: TAB at 04:37

## 2021-10-25 RX ADMIN — MAGNESIUM SULFATE IN WATER 4 G: 40 INJECTION, SOLUTION INTRAVENOUS at 08:09

## 2021-10-25 RX ADMIN — FLUOXETINE 20 MG: 20 CAPSULE ORAL at 04:36

## 2021-10-25 ASSESSMENT — LIFESTYLE VARIABLES
TREMOR: NO TREMOR
ORIENTATION AND CLOUDING OF SENSORIUM: ORIENTED AND CAN DO SERIAL ADDITIONS
TOTAL SCORE: 2
VISUAL DISTURBANCES: NOT PRESENT
AUDITORY DISTURBANCES: NOT PRESENT
TREMOR: NO TREMOR
TOTAL SCORE: 1
HEADACHE, FULLNESS IN HEAD: NOT PRESENT
ORIENTATION AND CLOUDING OF SENSORIUM: ORIENTED AND CAN DO SERIAL ADDITIONS
AUDITORY DISTURBANCES: NOT PRESENT
VISUAL DISTURBANCES: NOT PRESENT
AGITATION: NORMAL ACTIVITY
ANXIETY: MILDLY ANXIOUS
NAUSEA AND VOMITING: NO NAUSEA AND NO VOMITING
AGITATION: NORMAL ACTIVITY
HEADACHE, FULLNESS IN HEAD: NOT PRESENT
PAROXYSMAL SWEATS: NO SWEAT VISIBLE
NAUSEA AND VOMITING: NO NAUSEA AND NO VOMITING
PAROXYSMAL SWEATS: NO SWEAT VISIBLE
ANXIETY: *

## 2021-10-25 ASSESSMENT — PAIN DESCRIPTION - PAIN TYPE: TYPE: ACUTE PAIN

## 2021-10-25 NOTE — CARE PLAN
Problem: Knowledge Deficit - Standard  Goal: Patient and family/care givers will demonstrate understanding of plan of care, disease process/condition, diagnostic tests and medications  Outcome: Progressing     Problem: Optimal Care for Alcohol Withdrawal  Goal: Optimal Care for the alcohol withdrawal patient  Outcome: Progressing     Problem: Psychosocial  Goal: Patient's level of anxiety will decrease  Outcome: Progressing   The patient is Stable - Low risk of patient condition declining or worsening    Shift Goals  Clinical Goals: formed stool  Patient Goals: pain mgmt    Progress made toward(s) clinical / shift goals:  no S/S ETOH w/d    Patient is not progressing towards the following goals: diarrhea continues

## 2021-10-25 NOTE — CARE PLAN
Problem: Knowledge Deficit - Standard  Goal: Patient and family/care givers will demonstrate understanding of plan of care, disease process/condition, diagnostic tests and medications  Outcome: Progressing     Problem: Optimal Care for Alcohol Withdrawal  Goal: Optimal Care for the alcohol withdrawal patient  Outcome: Progressing   The patient is Watcher - Medium risk of patient condition declining or worsening    Shift Goals  Clinical Goals: BM monitor  Patient Goals: sleep      Patient is not progressing towards the following goals:      Problem: Psychosocial  Goal: Patient's level of anxiety will decrease  Outcome: Not Progressing

## 2021-10-25 NOTE — PROGRESS NOTES
COVID surge in effect. Report received from RN. Pt observed comfortable in bed, feeling stronger and wanting shower later tonight. POC discussed if necessary and all questions answered. Bed locked and in low position, with call light and appropriate belongings within reach. All needs and requirements met at this time.

## 2021-10-25 NOTE — DISCHARGE PLANNING
Anticipated Discharge Disposition: Home    Action: pt pending medical clearance, pt on room air and 6 clicks are 23, no case management needs identified.    Barriers to Discharge: medical clearance    Plan: f/u with medical team and pt to discuss dc needs and barriers.

## 2021-10-25 NOTE — DISCHARGE INSTRUCTIONS
Discharge Instructions    Discharged to home by car with self. Discharged via wheelchair, hospital escort: Yes.  Special equipment needed: Not Applicable    Be sure to schedule a follow-up appointment with your primary care doctor or any specialists as instructed.     Discharge Plan:   Diet Plan: Discussed  Activity Level: Discussed  Confirmed Follow up Appointment: Patient to Call and Schedule Appointment  Confirmed Symptoms Management: Discussed  Medication Reconciliation Updated: Yes  Influenza Vaccine Indication: Patient Refuses    I understand that a diet low in cholesterol, fat, and sodium is recommended for good health. Unless I have been given specific instructions below for another diet,     Special Instructions: None    · Is patient discharged on Warfarin / Coumadin?   No     Depression / Suicide Risk    As you are discharged from this [facility] facility, it is important to learn how to keep safe from harming yourself.    Recognize the warning signs:  · Abrupt changes in personality, positive or negative- including increase in energy   · Giving away possessions  · Change in eating patterns- significant weight changes-  positive or negative  · Change in sleeping patterns- unable to sleep or sleeping all the time   · Unwillingness or inability to communicate  · Depression  · Unusual sadness, discouragement and loneliness  · Talk of wanting to die  · Neglect of personal appearance   · Rebelliousness- reckless behavior  · Withdrawal from people/activities they love  · Confusion- inability to concentrate     If you or a loved one observes any of these behaviors or has concerns about self-harm, here's what you can do:  · Talk about it- your feelings and reasons for harming yourself  · Remove any means that you might use to hurt yourself (examples: pills, rope, extension cords, firearm)  · Get professional help from the community (Mental Health, Substance Abuse, psychological counseling)  · Do not be  alone:Call your Safe Contact- someone whom you trust who will be there for you.  · Call your local CRISIS HOTLINE 064-8987 or 930-349-2306  · Call your local Children's Mobile Crisis Response Team [place] (833) 119-8922 or www.Courseload  · Call the toll free National Suicide Prevention Hotlines   · National Suicide Prevention Lifeline 056-917-NLFF (4846)  · [employer] [facility] Line Network 800-SUICIDE (298-7545)      Hypokalemia  Hypokalemia means a low potassium level in the blood. Symptoms may include muscle weakness and cramping, fatigue, abdominal pain, vomiting, constipation, or irregularities of the heartbeat. Sometimes hypokalemia is discovered by your caregiver if you are taking certain medicines for high blood pressure or kidney disease.   Potassium is an electrolyte that helps regulate the amount of fluid in the body. It also stimulates muscle contraction and maintains a stable acid-base balance. If potassium levels go too low or too high, your health may be in danger. You are at risk for developing shock, heart, and lung problems. Hypokalemia can occur if you have excessive diarrhea, vomiting, or sweating. Potassium can be lost through your kidneys in the urine. Certain common medicines can also cause potassium loss, especially water pills (diuretics). The same is possible with cortisone medications or certain types of antibiotics. Low potassium can be dangerous if you are taking certain heart medicines. In diabetes, your potassium may fall after you take insulin, especially if your diabetes had been out of control for a while. In rare cases, potassium may be low because you are not getting enough in your diet.   In adults, a potassium level below 3.5 mEq/L is usually considered low.  Hypokalemia can be treated with potassium supplements taken by mouth and a diet that is high in potassium. Foods with high potassium content are:  · Peas, lentils, lima beans, nuts, and dried fruit.   · Whole grain  and bran cereals and breads.   · Fresh fruit and vegetables. Examples include:   · Bananas.   · Cantaloupe.   · Grapefruit.   · Oranges.   · Tomatoes.   · Honeydew melons.   · Potatoes.   · Peaches.   · Orange and tomato juices.   · Meats.   See your caregiver as instructed for a follow-up blood test to be sure your potassium is back to normal.  SEEK MEDICAL CARE IF:   · You have nausea, vomiting, constipation, or abdominal pain.   · You have palpitations or irregular heartbeats, chest pain or shortness of breath.   · You have muscle cramps or weakness or fatigue.   · You have lethargy.   SEEK IMMEDIATE MEDICAL CARE IF:   · You have paralysis.   · You have confusion or other mental status changes.   Document Released: 12/18/2006 Document Revised: 03/11/2013 Document Reviewed: 04/13/2011  [employer]na [employer]® Patient Information ©2013 [employer].

## 2021-10-25 NOTE — DISCHARGE SUMMARY
"Discharge Summary    Date of Admission: 10/21/2021  Date of Discharge: No discharge date for patient encounter.  Discharging Attending: Gael Harris M.D.   Discharging Senior Resident: Dr. Bryant  Discharging Intern: Dr. Alvarado    CHIEF COMPLAINT ON ADMISSION  Chief Complaint   Patient presents with   • Sent by MD     Recent blood work, concern for kidney function and 'low potassium levels'   • Abnormal Labs     Elevated creatinine and Potassium of 2.5 yesterday. Pt states she has not been eating/drinking much lately due to stress, denies any other sx such as chest pain.         Reason for Admission  Hypokalemia    Admission Date  10/21/2021    CODE STATUS  Full Code    HPI & HOSPITAL COURSE  41 years old female patient with a past medical history of hypertension, anxiety, depression, pancreatitis, alcoholic gastritis, history of alcoholic withdrawal complicated by seizure presented from her psychiatric office with abnormal electrolytes\" potassium of 2.3, magnesium 1.4, phosphorus 1.6\" patient was then admitted to the medical floor for replacement overdose electrolytes, her electrolytes were repleted with oral and IV replacements.  She was being monitored with the telemetry monitor without any significant rhythm abnormalities.  She also reported that as history of 8 weeks of daily nonbilious nonbloody vomiting associated with loose watery stool, lipase was within normal limits, she was treated with as needed antiemetics and antidiarrheal meds.  She was also placed on CIWA protocol since her last drink was right before the admission.  After CIWA score improved it was felt that she was safe to discharge.    Of note, the patient was counseled about alcohol cessation.    Therefore, she is discharged in good and stable condition to home with close outpatient follow-up.    The patient met 2-midnight criteria for an inpatient stay at the time of discharge.    PHYSICAL EXAM ON DISCHARGE  Temp:  [36.9 °C (98.5 °F)-37.7 °C " (99.8 °F)] 36.9 °C (98.5 °F)  Pulse:  [77-94] 77  Resp:  [16-18] 18  BP: (118-141)/() 137/95  SpO2:  [91 %-96 %] 93 %    Physical Exam    Discharge Date  October 25, 2021    FOLLOW UP ITEMS POST DISCHARGE  Follow-up with primary care physician.    DISCHARGE DIAGNOSES  Active Problems:    Anemia POA: Yes      Overview: Mild recheck am    Transaminitis POA: Yes      Overview: Mild, recheck am          High anion gap metabolic acidosis POA: Yes    Hypomagnesemia POA: Yes    Hypokalemia POA: Unknown    Alcohol use disorder, severe, dependence (HCC) POA: Yes    Anxiety disorder, unspecified POA: Yes    Chronic insomnia POA: Yes    Alcohol withdrawal (HCC) POA: Unknown    Adjustment disorder with mixed anxiety and depressed mood POA: Yes    Nausea & vomiting POA: Unknown    Diarrhea POA: Unknown  Resolved Problems:    * No resolved hospital problems. *      FOLLOW UP  No future appointments.  No follow-up provider specified.    MEDICATIONS ON DISCHARGE     Medication List      START taking these medications      Instructions   loperamide 2 MG Caps  Commonly known as: IMODIUM   Take 1 Capsule by mouth 4 times a day as needed for Diarrhea for up to 15 days.  Dose: 2 mg     magnesium oxide 400 MG Tabs tablet  Commonly known as: MAG-OX   Take 1 Tablet by mouth 2 times a day for 30 days.  Dose: 400 mg        CHANGE how you take these medications      Instructions   traZODone 150 MG Tabs  What changed: Another medication with the same name was removed. Continue taking this medication, and follow the directions you see here.  Commonly known as: DESYREL   Take 1-2 Tablets by mouth at bedtime.  Dose: 150-300 mg        CONTINUE taking these medications      Instructions   busPIRone 15 MG tablet  Commonly known as: BUSPAR   Take 1 Tablet by mouth 2 times a day.  Dose: 15 mg     FLUoxetine 20 MG Caps  Commonly known as: PROZAC   Take 1 Capsule by mouth every day.  Dose: 20 mg     Melatonin 10 MG Tabs   Take 20 mg by mouth at  bedtime.  Dose: 20 mg            Allergies  No Known Allergies    DIET  Orders Placed This Encounter   Procedures   • Diet Order Diet: Cardiac     Standing Status:   Standing     Number of Occurrences:   1     Order Specific Question:   Diet:     Answer:   Cardiac [6]       ACTIVITY  As tolerated.  Weight bearing as tolerated    CONSULTATIONS  n/a

## 2021-10-26 NOTE — PROGRESS NOTES
Patient to be discharged today - patient aware and agreeable to plan. D/c instructions reviewed with patient - ?'s/concerns answered.

## 2021-10-27 ENCOUNTER — OFFICE VISIT (OUTPATIENT)
Dept: BEHAVIORAL HEALTH | Facility: CLINIC | Age: 41
End: 2021-10-27
Payer: COMMERCIAL

## 2021-10-27 VITALS — BODY MASS INDEX: 27.66 KG/M2 | HEIGHT: 64 IN | WEIGHT: 162 LBS

## 2021-10-27 DIAGNOSIS — F10.930 ALCOHOL WITHDRAWAL SYNDROME WITHOUT COMPLICATION (HCC): ICD-10-CM

## 2021-10-27 DIAGNOSIS — F43.23 ADJUSTMENT DISORDER WITH MIXED ANXIETY AND DEPRESSED MOOD: ICD-10-CM

## 2021-10-27 DIAGNOSIS — F41.9 ANXIETY DISORDER, UNSPECIFIED TYPE: ICD-10-CM

## 2021-10-27 DIAGNOSIS — F10.20 ALCOHOL USE DISORDER, SEVERE, DEPENDENCE (HCC): ICD-10-CM

## 2021-10-27 DIAGNOSIS — F51.04 CHRONIC INSOMNIA: ICD-10-CM

## 2021-10-27 PROCEDURE — 99214 OFFICE O/P EST MOD 30 MIN: CPT | Performed by: PSYCHIATRY & NEUROLOGY

## 2021-10-27 PROCEDURE — 90833 PSYTX W PT W E/M 30 MIN: CPT | Performed by: PSYCHIATRY & NEUROLOGY

## 2021-10-27 RX ORDER — TRAZODONE HYDROCHLORIDE 150 MG/1
150-300 TABLET ORAL
Qty: 180 TABLET | Refills: 0 | Status: SHIPPED | OUTPATIENT
Start: 2021-10-27

## 2021-10-27 RX ORDER — FLUOXETINE HYDROCHLORIDE 40 MG/1
40 CAPSULE ORAL DAILY
Qty: 90 CAPSULE | Refills: 0 | Status: SHIPPED | OUTPATIENT
Start: 2021-10-27

## 2021-10-27 RX ORDER — BUSPIRONE HYDROCHLORIDE 15 MG/1
15 TABLET ORAL 2 TIMES DAILY
Qty: 180 TABLET | Refills: 0 | Status: SHIPPED | OUTPATIENT
Start: 2021-10-27

## 2021-10-27 RX ORDER — LORAZEPAM 2 MG/1
TABLET ORAL
Qty: 20 TABLET | Refills: 0 | Status: SHIPPED | OUTPATIENT
Start: 2021-10-27 | End: 2021-11-08

## 2021-10-27 RX ORDER — NALTREXONE HYDROCHLORIDE 50 MG/1
50 TABLET, FILM COATED ORAL DAILY
Qty: 30 TABLET | Refills: 0 | Status: SHIPPED | OUTPATIENT
Start: 2021-10-27

## 2021-10-27 ASSESSMENT — FIBROSIS 4 INDEX: FIB4 SCORE: 6.99

## 2021-11-01 ENCOUNTER — HOSPITAL ENCOUNTER (OUTPATIENT)
Dept: BEHAVIORAL HEALTH | Facility: MEDICAL CENTER | Age: 41
End: 2021-11-01
Attending: PSYCHIATRY & NEUROLOGY

## 2021-11-01 DIAGNOSIS — F10.20 ALCOHOL USE DISORDER, SEVERE, DEPENDENCE (HCC): Primary | ICD-10-CM

## 2021-11-01 PROCEDURE — 90791 PSYCH DIAGNOSTIC EVALUATION: CPT
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
## 2021-11-01 NOTE — ADDENDUM NOTE
Encounter addended by: Jackelyn Tinoco R.N. on: 11/1/2021 4:36 PM   Actions taken: Visit diagnoses modified, Order list changed, Diagnosis association updated

## 2021-11-05 NOTE — PROGRESS NOTES
I have reviewed the note by Jackelyn Tinoco RN and agree with the assessment and treatment plan.    1. Admit to Intensive Outpatient Program on 11/01/2021   - Group therapy per schedule,    - Psychoeducational groups per schedule,   - Individual/family counseling sessions with  per treatment plan.  2. Symptoms necessitating Intensive Outpatient Treatment: Alcohol Dependence  3. Medical screening/Physical exam per primary care provider or referring facility.    Jeremy Layne MD

## 2021-11-11 ENCOUNTER — TELEPHONE (OUTPATIENT)
Dept: BEHAVIORAL HEALTH | Facility: MEDICAL CENTER | Age: 41
End: 2021-11-11

## 2021-11-11 NOTE — TELEPHONE ENCOUNTER
Donna never showed up for PHP so she is taken off the schedule.  Phone calls daily have said her mailbox is full.

## 2021-11-12 ENCOUNTER — APPOINTMENT (OUTPATIENT)
Dept: BEHAVIORAL HEALTH | Facility: MEDICAL CENTER | Age: 41
End: 2021-11-12
Attending: PSYCHIATRY & NEUROLOGY
Payer: COMMERCIAL

## 2021-11-15 ENCOUNTER — APPOINTMENT (OUTPATIENT)
Dept: BEHAVIORAL HEALTH | Facility: MEDICAL CENTER | Age: 41
End: 2021-11-15
Attending: PSYCHIATRY & NEUROLOGY
Payer: COMMERCIAL

## 2021-12-23 DIAGNOSIS — F43.23 ADJUSTMENT DISORDER WITH MIXED ANXIETY AND DEPRESSED MOOD: ICD-10-CM

## 2021-12-23 DIAGNOSIS — F41.9 ANXIETY DISORDER, UNSPECIFIED TYPE: ICD-10-CM

## 2021-12-23 RX ORDER — FLUOXETINE HYDROCHLORIDE 20 MG/1
20 CAPSULE ORAL DAILY
Qty: 30 CAPSULE | Refills: 1 | Status: SHIPPED | OUTPATIENT
Start: 2021-12-23

## 2023-04-19 NOTE — BH THERAPY
Group Therapy Checklist  Attendance: Attended  Attendance Duration (min): (60)  Number of Participants: 9  Program/Group: Intensive Outpatient Program  Topics Covered: Mindfulness  Participation: Active verbal participation, Attentive  Affect/Mood Range: Flexible  Affect/Mood Display: Congruent w/content  Cognition: Oriented, Alert  Evidence of Imminent Suicide Risk: No  Evidence of imminent homicide risk: No  Therapeutic Interventions: Psychoeducation re: (Comment), Cognitive clarification  Progress Toward Treatment Goal: Moderate improvement   Detail Level: Simple

## 2023-09-25 NOTE — ED NOTES
Goal Outcome Evaluation:  Plan of Care Reviewed With: patient        Progress: no change Medicated for hypertension at the start of shift with relief voiced. With frequent c/o severe pain in right hip and leg.Repositioned,applied ice,and medicated x 2 with pain pills and once with IV.Rested well.              Patient unable to urinate, back to lobby with sample cup